# Patient Record
Sex: FEMALE | Race: WHITE | NOT HISPANIC OR LATINO | URBAN - METROPOLITAN AREA
[De-identification: names, ages, dates, MRNs, and addresses within clinical notes are randomized per-mention and may not be internally consistent; named-entity substitution may affect disease eponyms.]

---

## 2018-06-01 ENCOUNTER — INPATIENT (INPATIENT)
Facility: HOSPITAL | Age: 83
LOS: 3 days | Discharge: HOME | DRG: 61 | End: 2018-06-05
Attending: PSYCHIATRY & NEUROLOGY | Admitting: PSYCHIATRY & NEUROLOGY
Payer: MEDICARE

## 2018-06-01 VITALS
DIASTOLIC BLOOD PRESSURE: 78 MMHG | TEMPERATURE: 97 F | SYSTOLIC BLOOD PRESSURE: 160 MMHG | HEART RATE: 68 BPM | OXYGEN SATURATION: 98 % | RESPIRATION RATE: 16 BRPM

## 2018-06-01 DIAGNOSIS — I63.9 CEREBRAL INFARCTION, UNSPECIFIED: ICD-10-CM

## 2018-06-01 DIAGNOSIS — Z29.9 ENCOUNTER FOR PROPHYLACTIC MEASURES, UNSPECIFIED: ICD-10-CM

## 2018-06-01 DIAGNOSIS — S72.92XA UNSPECIFIED FRACTURE OF LEFT FEMUR, INITIAL ENCOUNTER FOR CLOSED FRACTURE: Chronic | ICD-10-CM

## 2018-06-01 LAB
ALBUMIN SERPL ELPH-MCNC: 3.9 G/DL — SIGNIFICANT CHANGE UP (ref 3.3–5)
ALP SERPL-CCNC: 77 U/L — SIGNIFICANT CHANGE UP (ref 40–120)
ALT FLD-CCNC: 11 U/L — SIGNIFICANT CHANGE UP (ref 10–45)
ANION GAP SERPL CALC-SCNC: 14 MMOL/L — SIGNIFICANT CHANGE UP (ref 5–17)
APTT BLD: 24.9 SEC — LOW (ref 27.5–37.4)
AST SERPL-CCNC: 18 U/L — SIGNIFICANT CHANGE UP (ref 10–40)
BASOPHILS NFR BLD AUTO: 0.2 % — SIGNIFICANT CHANGE UP (ref 0–2)
BILIRUB SERPL-MCNC: 0.6 MG/DL — SIGNIFICANT CHANGE UP (ref 0.2–1.2)
BUN SERPL-MCNC: 21 MG/DL — SIGNIFICANT CHANGE UP (ref 7–23)
CALCIUM SERPL-MCNC: 9 MG/DL — SIGNIFICANT CHANGE UP (ref 8.4–10.5)
CHLORIDE SERPL-SCNC: 100 MMOL/L — SIGNIFICANT CHANGE UP (ref 96–108)
CO2 SERPL-SCNC: 23 MMOL/L — SIGNIFICANT CHANGE UP (ref 22–31)
CREAT SERPL-MCNC: 0.68 MG/DL — SIGNIFICANT CHANGE UP (ref 0.5–1.3)
EOSINOPHIL NFR BLD AUTO: 1 % — SIGNIFICANT CHANGE UP (ref 0–6)
GLUCOSE SERPL-MCNC: 176 MG/DL — HIGH (ref 70–99)
HCT VFR BLD CALC: 39.1 % — SIGNIFICANT CHANGE UP (ref 34.5–45)
HGB BLD-MCNC: 12.9 G/DL — SIGNIFICANT CHANGE UP (ref 11.5–15.5)
INR BLD: 0.97 — SIGNIFICANT CHANGE UP (ref 0.88–1.16)
LYMPHOCYTES # BLD AUTO: 13.8 % — SIGNIFICANT CHANGE UP (ref 13–44)
MCHC RBC-ENTMCNC: 27.5 PG — SIGNIFICANT CHANGE UP (ref 27–34)
MCHC RBC-ENTMCNC: 33 G/DL — SIGNIFICANT CHANGE UP (ref 32–36)
MCV RBC AUTO: 83.4 FL — SIGNIFICANT CHANGE UP (ref 80–100)
MONOCYTES NFR BLD AUTO: 9.7 % — SIGNIFICANT CHANGE UP (ref 2–14)
NEUTROPHILS NFR BLD AUTO: 75.3 % — SIGNIFICANT CHANGE UP (ref 43–77)
PLATELET # BLD AUTO: 231 K/UL — SIGNIFICANT CHANGE UP (ref 150–400)
POTASSIUM SERPL-MCNC: 4.2 MMOL/L — SIGNIFICANT CHANGE UP (ref 3.5–5.3)
POTASSIUM SERPL-SCNC: 4.2 MMOL/L — SIGNIFICANT CHANGE UP (ref 3.5–5.3)
PROT SERPL-MCNC: 6.4 G/DL — SIGNIFICANT CHANGE UP (ref 6–8.3)
PROTHROM AB SERPL-ACNC: 10.8 SEC — SIGNIFICANT CHANGE UP (ref 9.8–12.7)
RBC # BLD: 4.69 M/UL — SIGNIFICANT CHANGE UP (ref 3.8–5.2)
RBC # FLD: 14.7 % — SIGNIFICANT CHANGE UP (ref 10.3–16.9)
SODIUM SERPL-SCNC: 137 MMOL/L — SIGNIFICANT CHANGE UP (ref 135–145)
TROPONIN T SERPL-MCNC: <0.01 NG/ML — SIGNIFICANT CHANGE UP (ref 0–0.01)
WBC # BLD: 6.2 K/UL — SIGNIFICANT CHANGE UP (ref 3.8–10.5)
WBC # FLD AUTO: 6.2 K/UL — SIGNIFICANT CHANGE UP (ref 3.8–10.5)

## 2018-06-01 PROCEDURE — 0042T: CPT

## 2018-06-01 PROCEDURE — 99291 CRITICAL CARE FIRST HOUR: CPT

## 2018-06-01 PROCEDURE — 70496 CT ANGIOGRAPHY HEAD: CPT | Mod: 26

## 2018-06-01 PROCEDURE — 70498 CT ANGIOGRAPHY NECK: CPT | Mod: 26

## 2018-06-01 PROCEDURE — 70450 CT HEAD/BRAIN W/O DYE: CPT | Mod: 26,59

## 2018-06-01 PROCEDURE — 99223 1ST HOSP IP/OBS HIGH 75: CPT

## 2018-06-01 RX ORDER — ALTEPLASE 100 MG
6.6 KIT INTRAVENOUS ONCE
Qty: 0 | Refills: 0 | Status: COMPLETED | OUTPATIENT
Start: 2018-06-01 | End: 2018-06-01

## 2018-06-01 RX ORDER — ALTEPLASE 100 MG
66.3 KIT INTRAVENOUS ONCE
Qty: 0 | Refills: 0 | Status: COMPLETED | OUTPATIENT
Start: 2018-06-01 | End: 2018-06-01

## 2018-06-01 RX ORDER — HALOPERIDOL DECANOATE 100 MG/ML
1 INJECTION INTRAMUSCULAR ONCE
Qty: 0 | Refills: 0 | Status: COMPLETED | OUTPATIENT
Start: 2018-06-01 | End: 2018-06-01

## 2018-06-01 RX ORDER — NICARDIPINE HYDROCHLORIDE 30 MG/1
5 CAPSULE, EXTENDED RELEASE ORAL
Qty: 40 | Refills: 0 | Status: DISCONTINUED | OUTPATIENT
Start: 2018-06-01 | End: 2018-06-01

## 2018-06-01 RX ORDER — LABETALOL HCL 100 MG
20 TABLET ORAL ONCE
Qty: 0 | Refills: 0 | Status: COMPLETED | OUTPATIENT
Start: 2018-06-01 | End: 2018-06-01

## 2018-06-01 RX ORDER — QUETIAPINE FUMARATE 200 MG/1
25 TABLET, FILM COATED ORAL ONCE
Qty: 0 | Refills: 0 | Status: COMPLETED | OUTPATIENT
Start: 2018-06-01 | End: 2018-06-01

## 2018-06-01 RX ORDER — LABETALOL HCL 100 MG
10 TABLET ORAL ONCE
Qty: 0 | Refills: 0 | Status: COMPLETED | OUTPATIENT
Start: 2018-06-01 | End: 2018-06-01

## 2018-06-01 RX ADMIN — Medication 10 MILLIGRAM(S): at 17:23

## 2018-06-01 RX ADMIN — QUETIAPINE FUMARATE 25 MILLIGRAM(S): 200 TABLET, FILM COATED ORAL at 17:58

## 2018-06-01 RX ADMIN — ALTEPLASE 66.3 MILLIGRAM(S): KIT at 16:50

## 2018-06-01 RX ADMIN — ALTEPLASE 396 MILLIGRAM(S): KIT at 16:49

## 2018-06-01 RX ADMIN — HALOPERIDOL DECANOATE 1 MILLIGRAM(S): 100 INJECTION INTRAMUSCULAR at 20:31

## 2018-06-01 RX ADMIN — Medication 0.5 MILLIGRAM(S): at 19:34

## 2018-06-01 RX ADMIN — Medication 20 MILLIGRAM(S): at 17:26

## 2018-06-01 RX ADMIN — HALOPERIDOL DECANOATE 1 MILLIGRAM(S): 100 INJECTION INTRAMUSCULAR at 19:17

## 2018-06-01 NOTE — H&P ADULT - ASSESSMENT
89 yo female with unknown past medical history who presented with aphasia, confusion, left sided droop and NIHSS of 5 - now s/p tPA at 15:28.

## 2018-06-01 NOTE — PATIENT PROFILE ADULT. - FALL HARM RISK
coagulation(Bleeding disorder R/T clinical cond/anti-coags)/s/p TPA. Agitated and confused/age(85 years old or older)/other

## 2018-06-01 NOTE — PATIENT PROFILE ADULT. - FUNCTIONAL SCREEN CURRENT LEVEL: COMMUNICATION, MLM
very confused and agitated very confused and agitated/(0) understands/communicates without difficulty

## 2018-06-01 NOTE — ED PROVIDER NOTE - CONSTITUTIONAL, MLM
normal... Well appearing, well nourished, awake, alert, oriented to person, place, and in no apparent distress.  Apparent difficulty forming sentences and following commands

## 2018-06-01 NOTE — PATIENT PROFILE ADULT. - HEALTHCARE QUESTIONS, PROFILE
(cont. 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 Atherosclerotic disease involving L carotid bifurcation w/ approximately 20-30% stenosis of L ICA origin by NASCET criteria. CTA HEAD: Branch occlusion of opercular branch of MCA. MRA Neck/ MRI brain 7/23: Acute infarct involving R anterior superior MCA territory w/o associated hemorrhage or mass effect. Suspicion of branch occlusion of opercular segment of MCA on R.CXR 7/22:(-) darryl (cont. 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 Atherosclerotic disease involving L carotid bifurcation w/ approximately 20-30% stenosis of L ICA origin by NASCET criteria. CTA HEAD: Branch occlusion of opercular branch of MCA. MRA Neck/ MRI brain 7/23: Acute infarct involving R anterior superior MCA territory w/o associated hemorrhage or mass effect. Suspicion of branch occlusion of opercular segment of MCA on R.CXR 7/22:(-)    SOCIAL HX: Pt lives with spouse in private home with 2 stairs to enter (B HR), first floor setup within. PTA, pt requiring assist with IADLs from spouse. Pt owns walker, shower chair. Pt states spouse and daughters able to provide assist upon d/c home.

## 2018-06-01 NOTE — ED ADULT NURSE NOTE - OBJECTIVE STATEMENT
Patient BIBA due to left sided facial droop and confusion, Code Stroke upgrade activated immediately on arrival and CT scan done.

## 2018-06-01 NOTE — CONSULT NOTE ADULT - SUBJECTIVE AND OBJECTIVE BOX
**STROKE CODE CONSULT NOTE**    Last known well time/Time of onset of symptoms: 06/01/19, AM     HPI: 89 F w/ unknown pmhx (on ASA), BIBEMS after doormen found her to have a facial droop. Patient unable to provide any coherent history due to confusion. Reportedly alert and oriented x3 at baseline and lives independently.  Was returning from lunch when she was noted to be confused and have left facial droop by doorman. Sent to ED, where stroke code was called. Patient found to have facial droop, left sided drift, and significant aphasia (NIH:5). Initial CTH negative, CTP without mismatch, CTA: Multifocal areas of narrowing involving P1 segment of the right   posterior cerebral artery and near origin of right superior cerebellar artery consistent with atherosclerotic changes in the posterior circulation on the right. tPA pushed at 15:28. Will admitt o MICU    PAST MEDICAL & SURGICAL HISTORY:  No pertinent past medical history  No significant past surgical history      FAMILY HISTORY:      SOCIAL HISTORY:  Smoking Cesation: Discussed    ROS:  Constitutional: No fever, weight loss or fatigue  Eyes: No eye pain, visual disturbances, or discharge  ENMT:  No difficulty hearing, tinnitus, vertigo; No sinus or throat pain  Neck: No pain or stiffness  Respiratory: No cough, wheezing, chills or hemoptysis  Cardiovascular: No chest pain, palpitations, shortness of breath, dizziness or leg swelling  Gastrointestinal: No abdominal pain. No nausea, vomiting or hematemesis; No diarrhea or constipation. Nohematochezia.  Genitourinary: No dysuria, frequency, hematuria or incontinence  Neurological: As per HPI  Skin: No itching, burning, rashes or lesions   Endocrine: No heat or cold intolerance; No hair loss  Musculoskeletal: No joint pain or swelling; No muscle, back or extremity pain  Psychiatric: No depression, anxiety, mood swings or difficulty sleeping  Heme/Lymph: No easy bruising or bleeding gums    MEDICATIONS  (STANDING):  alteplase    Bolus 6.6 milliGRAM(s) IV Bolus once  alteplase    IVPB 66.3 milliGRAM(s) IV Intermittent once    MEDICATIONS  (PRN):      Allergies    No Known Allergies    Intolerances        Vital Signs Last 24 Hrs  T(C): 36.2 (01 Jun 2018 16:00), Max: 36.2 (01 Jun 2018 16:00)  T(F): 97.1 (01 Jun 2018 16:00), Max: 97.1 (01 Jun 2018 16:00)  HR: 67 (01 Jun 2018 16:30) (67 - 84)  BP: 185/82 (01 Jun 2018 16:30) (153/73 - 192/91)  BP(mean): --  RR: 18 (01 Jun 2018 16:30) (16 - 18)  SpO2: 100% (01 Jun 2018 16:30) (98% - 100%)    PHYSICAL EXAM:  Constitutional: NAD, confused  Cardiovascular: RRR, no appreciable murmurs; no carotid bruits  Neurologic:  Mental status: Awake, alert and oriented x1.  Recent and remote memory impaired.  Naming, repetition and comprehension impaired  Attention/concentration intact. +APHASIA,  No dysarthria.   Cranial nerves: + LEFT FACIAL DROOP. Fundoscopic exam demonstrated no abnormalities, pupils equally round and reactive to light, visual fields full, no nystagmus, extraocular muscles intact, hearing intact to finger rub, palate elevation symmetric, tongue was midline, sternocleidomastoid/shoulder shrug strength bilaterally 5/5.    Motor:  LUE AND LLE DRIFT. Right sided motor intact 5/5.  Sensation: Intact to light touch, proprioception, vibration, temperature, pinprick.  No neglect.   Coordination: No dysmetria on finger-to-nose and heel-to-shin.  No clumsiness.  Reflexes: 2+ in upper and lower extremities, absent Babinski bilaterally  Gait: Narrow and steady. No ataxia.  Romberg negative    NIHSS: 5    Fingerstick Blood Glucose: CAPILLARY BLOOD GLUCOSE  152 (01 Jun 2018 16:37)      POCT Blood Glucose.: 152 mg/dL (01 Jun 2018 15:03)       LABS:                        12.9   6.2   )-----------( 231      ( 01 Jun 2018 15:19 )             39.1     06-01    137  |  100  |  21  ----------------------------<  176<H>  4.2   |  23  |  0.68    Ca    9.0      01 Jun 2018 15:19    TPro  6.4  /  Alb  3.9  /  TBili  0.6  /  DBili  x   /  AST  18  /  ALT  11  /  AlkPhos  77  06-01    PT/INR - ( 01 Jun 2018 15:19 )   PT: 10.8 sec;   INR: 0.97          PTT - ( 01 Jun 2018 15:19 )  PTT:24.9 sec  CARDIAC MARKERS ( 01 Jun 2018 15:19 )  x     / <0.01 ng/mL / x     / x     / x              RADIOLOGY & ADDITIONAL STUDIES:  < from: CT Brain Stroke Protocol (06.01.18 @ 16:00) >  EXAM:  CT BRAIN STROKE PROTOCOL                          PROCEDURE DATE:  06/01/2018          INTERPRETATION:  PROCEDURE: CT head without intravenous contrast.    INDICATION: Expressive aphasia, facial droop    TECHNIQUE: Multiple axial sections were obtained at 5 mm intervals. The   images were reviewed in brain and bone windows. Imaging is performed   using helical low-dose technique, and sagittal and coronal reformations   are provided.    COMPARISON: 09/12/2009    FINDINGS:    There is diffuse cortical volume loss which has progressed compared to   the 2009 study. No evidence of hydrocephalus. There is also progression   and much more evident small vessel ischemic change with confluent and   patchy periventricular white matter lucency. There are new a chronic   appearing infarctions in the left caudate nucleus and putamen and in the   right occipital lobe where there is focal encephalomalacia and gliosis.    There is no acute intracranial hemorrhage or CT evidence of recent   transcortical infarction. No mass effect or midline shift or extra-axial   collection.    The bone window images show no calvarial fracture. The included paranasal   sinuses and mastoid air cells are predominantly well ventilated. The   native ocular lensesare absent, new from 2009.    IMPRESSION:     No acute intracranial abnormality. Specifically, no acute intracranial   hemorrhage, mass effect or recent transcortical or territorial infarction.    Parenchymal volume loss and small vessel ischemic changes have progressed   compared to 2009. Left basal ganglia and right occipital prior   infarction, though new from 2009.      < end of copied text >  CTP: no mismatch     < from: CT Angio Neck w/ IV Cont (06.01.18 @ 15:39) >    EXAM:  CT ANGIO BRAIN (W)AW IC                          EXAM:  CT ANGIO NECK (W)AW IC                          PROCEDURE DATE:  06/01/2018          INTERPRETATION:  CT ANGIOGRAM OF THE CERVICAL ARTERIES   CTA OF THE [place] OF [place]:    INDICATION:Expressive aphasia, facial droop.    TECHNIQUE:     CTA [place] OF [place]:     After the intravenous power injection of non-ionic contrast material,   serial thin sections were obtained through the intracranial circulation   on a multislice CT scannerfollowed by multiplanar 3D reformations, and   comparison is made to the prior CT head noncontrast from 6/1/2018.     CTA NECK:    After the intravenous power injection of non-ionic contrast material,   serial thin sections were obtained through the cervical circulation on a   multislice CT scanner, and comparison is made to the prior CT head   noncontrast from 6/1/2018.     FINDINGS:    CTA [place]:    There is no stenosis seen in the distal vertebral arteries. There are   atherosclerotic calcifications noted along the distal vertebral arteries,   left more than right. Basilar artery demonstrates no stenosis. There is   no stenosis in the left superior cerebellar artery and left posterior   cerebral artery. There is a mild degree ofnarrowing at the origin of   right superior cerebellar artery. There are 2 focal areas of diminished   flow-related enhancement consistent with stenoses involving P1 segment of   right PCA likely related to atherosclerotic disease. No stenosis is seen   in the MICHELLE bilaterally. There is no stenosis identified in the MCA   bilaterally. There is no significant stenosis seen in the cavernous or   supraclinoid ICA. There are atherosclerotic calcifications noted at the   level of the cavernous segment of the ICA bilaterally.     CTA NECK:    Origin of the left subclavian artery, left common carotid artery, right   innominate artery, right subclavian artery, and right common carotid   artery are all patent. Both vertebral artery origins are patent.    The cervical course of the vertebral arteries demonstrates no stenosis.   There is no left carotid bifurcation stenosis identified. There are   atherosclerotic calcifications along the origin of right ICA but there is   no significant stenosis.    There is multilevel degenerative osteoarthritis in which findings loss of   disc space height and endplate sclerosis particularly from C4-C5 through   to the C6-C7 levels. There is multilevel degenerative osteoarthritis.   Findings include marginal endplate osteophytes and uncovertebral   spurring, and there are anterolistheses of C2 on C3 and C3 on C4.     IMPRESSION:    CTA COW:    1. No high-grade stenosis of the anterior circulation.  2. Multifocal areas of narrowing involving P1 segment of the right   posterior cerebral artery and near origin of right superior cerebellar   artery consistent with atherosclerotic changes in the posterior   circulation on the right.    CTA NECK:    1. No vertebral artery stenosis.   2. No carotid bifurcationstenosis.  3. 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          < end of copied text >    IV-tPA (Y/N):                                   Bolus time:  Reason IV-tPA not given:    ASSESSMENT/PLAN:  89 year old female w/ unknown pmhx, on ASA, presents with aphasia with left sided droop and LUE, LLE drift (NIH 5). CT imaging without acute stroke, however with atherosclerotic changes involving posterior segments. tPA administered at 15:28.  - Admit to MICU for neurological monitoring  - tPA protocol     Discussed w/ stroke attending

## 2018-06-01 NOTE — ED PROVIDER NOTE - NEUROLOGICAL, MLM
Alert and oriented to self, no facial asymmetry, expressive and receptive difficulty with words and following commands

## 2018-06-01 NOTE — PATIENT PROFILE ADULT. - ABILITY TO HEAR (WITH HEARING AID OR HEARING APPLIANCE IF NORMALLY USED):
hearing aide is at home/Mildly to Moderately Impaired: difficulty hearing in some environments or speaker may need to increase volume or speak distinctly

## 2018-06-01 NOTE — H&P ADULT - NSHPLABSRESULTS_GEN_ALL_CORE
LABS:                        12.9   6.2   )-----------( 231      ( 01 Jun 2018 15:19 )             39.1     06-01    137  |  100  |  21  ----------------------------<  176<H>  4.2   |  23  |  0.68    Ca    9.0      01 Jun 2018 15:19    TPro  6.4  /  Alb  3.9  /  TBili  0.6  /  DBili  x   /  AST  18  /  ALT  11  /  AlkPhos  77  06-01    PT/INR - ( 01 Jun 2018 15:19 )   PT: 10.8 sec;   INR: 0.97          PTT - ( 01 Jun 2018 15:19 )  PTT:24.9 sec    CT Brain Stroke Protocol (06.01.18 @ 16:00)     IMPRESSION:     No acute intracranial abnormality. Specifically, no acute intracranial   hemorrhage, mass effect or recent transcortical or territorial infarction.    Parenchymal volume loss and small vessel ischemic changes have progressed   compared to 2009. Left basal ganglia and right occipital prior   infarction, though new from 2009.    IMPRESSION:    CTA COW:    1. No high-grade stenosis of the anterior circulation.  2. Multifocal areas of narrowing involving P1 segment of the right   posterior cerebral artery and near origin of right superior cerebellar   artery consistent with atherosclerotic changes in the posterior   circulation on the right.    CTA NECK:    1. No vertebral artery stenosis.   2. No carotid bifurcationstenosis.  3. 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

## 2018-06-01 NOTE — H&P ADULT - NSHPPHYSICALEXAM_GEN_ALL_CORE
ICU Vital Signs Last 24 Hrs  T(C): 36.2 (01 Jun 2018 18:30), Max: 36.2 (01 Jun 2018 16:00)  T(F): 97.2 (01 Jun 2018 18:30), Max: 97.2 (01 Jun 2018 17:00)  HR: 98 (01 Jun 2018 18:30) (67 - 98)  BP: 148/77 (01 Jun 2018 18:30) (148/77 - 192/91)  BP(mean): --  ABP: --  ABP(mean): --  RR: 16 (01 Jun 2018 18:30) (16 - 18)  SpO2: 98% (01 Jun 2018 18:30) (98% - 100%)    PHYSICAL EXAM:    Constitutional: confused, intermittently answering questions appropriately  HEENT: PERRLA, EOMI, Normal Hearing, DMM  Neck: No LAD, No JVD  Back: Normal spine flexure, No CVA tenderness  Respiratory: CTAB  Cardiovascular: S1 and S2, tachycardic regular, no M/G/R  Gastrointestinal: BS+, soft, NT/ND  Extremities: No peripheral edema  Vascular: 2+ peripheral pulses  Neurological: A/O x 2. CN 2-12 intact. No appreciable facial droop on exam. Patient's speech intelligible, understands and repeats. Motor strength 4/5 b/l ue proximal and distal. 5/5 finger strength b/l. 5/5 lower extremity proximal/distal b/l. Sensation grossly intact throughout, no neglect noted. Downgoing toes b/l. Reflexes 2+ throughout. Cerebellar testing intact - finger to nose + heel to shin. Gait deferred.   Skin: seborrheic keratosis lower extremities

## 2018-06-01 NOTE — H&P ADULT - PROBLEM SELECTOR PLAN 1
- patient presenting with signs and symptoms consistent with ischemic stroke. NIHSS of 5 on admission. Now s/p tPA at 15:28. CT head w/o hemorrhage.   - CTA head and neck with evidence of atherosclerosis and previous strokes, no opacities consistent with thrombus  - transfer to MICU for tPA protocol  - goal BP <180/105 mmHg; nicardipine gtt to target goal   - sedation as needed with haldol/ativan given patient's confusion and attempts to leave bed  - PT eval after 24 hours  - bedside dysphagia  - repeat head ct tomorrow afternoon  - monitor on telemetry for arrythmia  - further work up following tPA protocol.

## 2018-06-01 NOTE — H&P ADULT - NSHPSOCIALHISTORY_GEN_ALL_CORE
Patient retired, lives at home alone. Has home health aide three days per week. Uses a walker at home. Denies alcohol, tobacco, drug use.

## 2018-06-01 NOTE — ED PROVIDER NOTE - MEDICAL DECISION MAKING DETAILS
acute cva with expressive aphasia and difficulty following commands.  stroke code called on arrival.  initial ct neg for acute changes.  stroke attending Dr. Luna at bedside.  decision made to give TPA>  ct perfusion and cta performed.  tpa given.  admit to ICU

## 2018-06-01 NOTE — H&P ADULT - HISTORY OF PRESENT ILLNESS
89 yo female with unknown past medical history who presents from home with facial droop, aphasia after she was found be her doorman who called the ambulance. Per the son patient is semi independent at baseline - she lives alone but has an aide who visits her 3 times per week. She ambulates typically with a walker but has had multiple falls in the past resulting in hip fracture and knee fracture. Son is unaware of her past medical history but states that she follows with a PMD - home meds found by son include celecoxib and ambien 10 mg. Old prescriptions found at home included gabapentin, oxycodone. Patient reports taking her medications but is unclear about which specifically. On arrival to the ED stroke code was called and patient was found to have NIHSS of 5 for facial droop, left sided drift and significant aphasia.  Head CT showed no acute bleed so tPA was administered at 15:28. Patient admitted to MICU per tPA protocol.     In the ED patient received seroquel 25 mg for agitation and labetalol for elevated BP.

## 2018-06-01 NOTE — ED PROVIDER NOTE - OBJECTIVE STATEMENT
brought in by EMS with confusion and reported facial droop.  Patient unable to provide any coherent history due to confusion.  Reportedly alert and oriented x3 at baseline and lives independently.  Was returning from lunch when she was noted to be confused and have left facial droop by doorman.  EMS arrived and noted resolution of facial droop but persistence of confusion.

## 2018-06-01 NOTE — ED PEDIATRIC NURSE REASSESSMENT NOTE - NS ED NURSE REASSESS COMMENT FT2
In to round on patient. Patient attempting to climb out of bed. Alert and oriented to self only. Per bedside RN Bola patient was cooperative during administration of TPA but now is acutely more restless. Patient BEAL x 4, kicking, slapping staff members and attempting to climb out of bed. Unable to obtain VS due to patients combativeness. Reinforced safety and plan of care. Attempts to deescalate patient unsuccessful due to agitation and confusion. ICU resident and MD Healy contacted to reevaluate patient in setting of post TPA confusion and agitation. Patient pending bed transfer. ICU at bedside evaluating patient.

## 2018-06-01 NOTE — ED ADULT NURSE REASSESSMENT NOTE - NS ED NURSE REASSESS COMMENT FT1
Patient on Stroke Code activation protocol, BIBA due to confusion and left sided facial droop.  Patient arrives disoriented to place, w/ expressive and verbal aphasia, left sided facial droop, left upper and lower extremity drift, no chest pain or SOB, c/o of weakness.  NIH scale score 5.  CT scan head and CT head angio completed.  Left and right AC PIV #18 in place, all labs sent.  Neuro at bedside Dr. Larose, decision to administered TPA.  TPA bolus 6.63mg IVP administered by Dr. Larose @ 1528 Hr.  followed by TPA IV drip started at 1532H.  Neuro assessments, cardiac and BP monitoring ongoing every 15 min.  NIH scale score improved. Dysphagia screening completed and passed.   Labetatalol 10mg IVP followed by labetalol 20mg IVP 1 hour after administered as ordered per protocol to maintain SBP < 180mmHg w/ good effects.  Patient had episode of agitation, trying to get out of bed and leave, Dr. Larose at bedside and evaluated patient.  Seroquel 25mg PO adminsitered as ordered.  1:1 bedside EDT ongoing for patient safety and agitation.  Patient for admit;  room assignment pending.  Cardiac , BP and Neuro assessments ongoing every 15min post TPA as ordered.  Patient stable and comfortable.
ADmitting ICU MDs Dr. Castro and Dr. Amezquita at bedside re-evaluating patient, states patient NOT having a stroke at this time, patient agitated but no new neuro deficits, NO need for new CT scan as per MDs and may be safely transported to [facility] at this time in stable condition.  NSR on cardiac monitor, vital signs stable, NIH scale score 0.
Dr. Amezquita admitting MD ICU called to re-evaluate patient prior to transport to [facility] after endorsement and report received by [facility] EVELYN Black .  Reported to Dr. Amezquita patient more agitated, sometimes combative, wants to leave,crying and yelling.  ED Educator EVELYN Hearn  and EDTs at bedside for patient safety.  Dr. Amezquita stated will come down to Ed to evaluate patient for possible repeat CT scan.
Patient more agitated at this time, insisting on leaving, sometimes screaming and yelling, sometimes crying,  1:1 in place for patient safety and patient constantly reasuured.  7 Sharad RN Sofia received endorsement report and care at this time.  ICU MD also called at this time due to patient increased restlessness and agitation for further bedside evaluation.

## 2018-06-02 DIAGNOSIS — G92 TOXIC ENCEPHALOPATHY: ICD-10-CM

## 2018-06-02 DIAGNOSIS — N30.00 ACUTE CYSTITIS WITHOUT HEMATURIA: ICD-10-CM

## 2018-06-02 DIAGNOSIS — R33.9 RETENTION OF URINE, UNSPECIFIED: ICD-10-CM

## 2018-06-02 LAB
ANION GAP SERPL CALC-SCNC: 13 MMOL/L — SIGNIFICANT CHANGE UP (ref 5–17)
APPEARANCE UR: CLEAR — SIGNIFICANT CHANGE UP
APTT BLD: 25.2 SEC — LOW (ref 27.5–37.4)
BILIRUB UR-MCNC: NEGATIVE — SIGNIFICANT CHANGE UP
BUN SERPL-MCNC: 13 MG/DL — SIGNIFICANT CHANGE UP (ref 7–23)
CALCIUM SERPL-MCNC: 8.8 MG/DL — SIGNIFICANT CHANGE UP (ref 8.4–10.5)
CHLORIDE SERPL-SCNC: 97 MMOL/L — SIGNIFICANT CHANGE UP (ref 96–108)
CO2 SERPL-SCNC: 25 MMOL/L — SIGNIFICANT CHANGE UP (ref 22–31)
COLOR SPEC: YELLOW — SIGNIFICANT CHANGE UP
CREAT SERPL-MCNC: 0.54 MG/DL — SIGNIFICANT CHANGE UP (ref 0.5–1.3)
DIFF PNL FLD: ABNORMAL
GLUCOSE SERPL-MCNC: 135 MG/DL — HIGH (ref 70–99)
GLUCOSE UR QL: NEGATIVE — SIGNIFICANT CHANGE UP
HCT VFR BLD CALC: 37.7 % — SIGNIFICANT CHANGE UP (ref 34.5–45)
HGB BLD-MCNC: 12.4 G/DL — SIGNIFICANT CHANGE UP (ref 11.5–15.5)
INR BLD: 1.03 — SIGNIFICANT CHANGE UP (ref 0.88–1.16)
KETONES UR-MCNC: NEGATIVE — SIGNIFICANT CHANGE UP
LEUKOCYTE ESTERASE UR-ACNC: ABNORMAL
MAGNESIUM SERPL-MCNC: 2 MG/DL — SIGNIFICANT CHANGE UP (ref 1.6–2.6)
MCHC RBC-ENTMCNC: 27.4 PG — SIGNIFICANT CHANGE UP (ref 27–34)
MCHC RBC-ENTMCNC: 32.9 G/DL — SIGNIFICANT CHANGE UP (ref 32–36)
MCV RBC AUTO: 83.2 FL — SIGNIFICANT CHANGE UP (ref 80–100)
NITRITE UR-MCNC: POSITIVE
PH UR: 6.5 — SIGNIFICANT CHANGE UP (ref 5–8)
PLATELET # BLD AUTO: 201 K/UL — SIGNIFICANT CHANGE UP (ref 150–400)
POTASSIUM SERPL-MCNC: 3.8 MMOL/L — SIGNIFICANT CHANGE UP (ref 3.5–5.3)
POTASSIUM SERPL-SCNC: 3.8 MMOL/L — SIGNIFICANT CHANGE UP (ref 3.5–5.3)
PROT UR-MCNC: NEGATIVE MG/DL — SIGNIFICANT CHANGE UP
PROTHROM AB SERPL-ACNC: 11.4 SEC — SIGNIFICANT CHANGE UP (ref 9.8–12.7)
RBC # BLD: 4.53 M/UL — SIGNIFICANT CHANGE UP (ref 3.8–5.2)
RBC # FLD: 14.8 % — SIGNIFICANT CHANGE UP (ref 10.3–16.9)
SODIUM SERPL-SCNC: 135 MMOL/L — SIGNIFICANT CHANGE UP (ref 135–145)
SP GR SPEC: 1.01 — SIGNIFICANT CHANGE UP (ref 1–1.03)
UROBILINOGEN FLD QL: 0.2 E.U./DL — SIGNIFICANT CHANGE UP
WBC # BLD: 5.5 K/UL — SIGNIFICANT CHANGE UP (ref 3.8–10.5)
WBC # FLD AUTO: 5.5 K/UL — SIGNIFICANT CHANGE UP (ref 3.8–10.5)

## 2018-06-02 PROCEDURE — 70450 CT HEAD/BRAIN W/O DYE: CPT | Mod: 26

## 2018-06-02 PROCEDURE — 99233 SBSQ HOSP IP/OBS HIGH 50: CPT

## 2018-06-02 PROCEDURE — 93306 TTE W/DOPPLER COMPLETE: CPT | Mod: 26

## 2018-06-02 RX ORDER — HEPARIN SODIUM 5000 [USP'U]/ML
5000 INJECTION INTRAVENOUS; SUBCUTANEOUS EVERY 8 HOURS
Qty: 0 | Refills: 0 | Status: DISCONTINUED | OUTPATIENT
Start: 2018-06-02 | End: 2018-06-05

## 2018-06-02 RX ORDER — CEFTRIAXONE 500 MG/1
1 INJECTION, POWDER, FOR SOLUTION INTRAMUSCULAR; INTRAVENOUS EVERY 24 HOURS
Qty: 0 | Refills: 0 | Status: DISCONTINUED | OUTPATIENT
Start: 2018-06-02 | End: 2018-06-04

## 2018-06-02 RX ORDER — ATORVASTATIN CALCIUM 80 MG/1
40 TABLET, FILM COATED ORAL AT BEDTIME
Qty: 0 | Refills: 0 | Status: DISCONTINUED | OUTPATIENT
Start: 2018-06-02 | End: 2018-06-05

## 2018-06-02 RX ADMIN — HEPARIN SODIUM 5000 UNIT(S): 5000 INJECTION INTRAVENOUS; SUBCUTANEOUS at 21:28

## 2018-06-02 RX ADMIN — CEFTRIAXONE 100 GRAM(S): 500 INJECTION, POWDER, FOR SOLUTION INTRAMUSCULAR; INTRAVENOUS at 02:46

## 2018-06-02 RX ADMIN — ATORVASTATIN CALCIUM 40 MILLIGRAM(S): 80 TABLET, FILM COATED ORAL at 21:28

## 2018-06-02 NOTE — PROVIDER CONTACT NOTE (OTHER) - SITUATION
Upon reassessment patient noted with lower abdominal distention. Patient restless stating that she needs to use the bathroom. Unable to void in bed pain when toileting.

## 2018-06-02 NOTE — PROGRESS NOTE ADULT - PROBLEM SELECTOR PLAN 2
F: no IVF  E: replete prn  N: pending dysphagia screen, dash tlc  full code  dispo: MICU -UA mildly positive, no suprapubic tenderness  -started ceftriaxone 1g qd (6/2 - present)

## 2018-06-02 NOTE — PROGRESS NOTE ADULT - PROBLEM SELECTOR PLAN 4
F: no IVF  E: replete prn  N: pending dysphagia screen, dash tlc  full code  dispo: MICU -resolved  -likely 2/2 UTI

## 2018-06-02 NOTE — PROGRESS NOTE ADULT - PROBLEM SELECTOR PLAN 3
-resolved  -likely 2/2 UTI -acute onset  -etiology unknown  -garcía placed on 6/2/18  -will have TOV

## 2018-06-02 NOTE — PROVIDER CONTACT NOTE (OTHER) - ACTION/TREATMENT ORDERED:
Straight catheter completed as per MD order. Please review EMR for details. Patient verbalized relief. Will continue to monitor.

## 2018-06-02 NOTE — PROGRESS NOTE ADULT - PROBLEM SELECTOR PLAN 1
- patient presenting with signs and symptoms consistent with ischemic stroke. NIHSS of 5 on admission. Now s/p tPA at 15:28. CT head w/o hemorrhage.   - CTA head and neck with evidence of atherosclerosis and previous strokes, no opacities consistent with thrombus  - transfer to MICU for tPA protocol  - goal BP <180/105 mmHg; nicardipine gtt to target goal   - sedation as needed with haldol/ativan given patient's confusion and attempts to leave bed  - PT eval after 24 hours  - bedside dysphagia  - repeat head ct tomorrow afternoon  - monitor on telemetry for arrythmia  - further work up following tPA protocol. - patient presenting with signs and symptoms consistent with ischemic stroke. NIHSS of 5 on admission. Now s/p tPA at 15:28. CT head w/o hemorrhage.   - CTA head and neck with evidence of atherosclerosis and previous strokes, no opacities consistent with thrombus  - transfer to MICU for tPA protocol  - goal BP <180/105 mmHg;   - sedation as needed with haldol/ativan given patient's confusion and attempts to leave bed  - PT eval after 24 hours  - passed bedside dysphagia  - repeat head ct tomorrow afternoon  - monitor on telemetry for arrythmia  - further work up following tPA protocol. - patient presenting with signs and symptoms consistent with ischemic stroke. NIHSS of 5 on admission. Now s/p tPA at 15:28. CT head w/o hemorrhage.   - CTA head and neck with evidence of atherosclerosis and previous strokes, no opacities consistent with thrombus  - transfer to MICU for tPA protocol  - goal BP <180/105 mmHg;   - sedation as needed with haldol/ativan given patient's confusion and attempts to leave bed  - PT eval after 24 hours  - passed bedside dysphagia  - repeat head ct on 6/2/18 stable  - monitor on telemetry for arrythmia  - started lipitor 80, ASA 81 and plavix 75  - CARLA ordered - patient presenting with signs and symptoms consistent with ischemic stroke. NIHSS of 5 on admission. Now s/p tPA at 15:28. CT head w/o hemorrhage.   - CTA head and neck with evidence of atherosclerosis and previous strokes, no opacities consistent with thrombus  - transfer to MICU for tPA protocol  - goal BP <180/105 mmHg;   - sedation as needed with haldol/ativan given patient's confusion and attempts to leave bed  - PT eval after 24 hours  - passed bedside dysphagia  - repeat head ct on 6/2/18 stable  - monitor on telemetry for arrythmia  - started lipitor 80   - consider starting ASA 81 and plavix 75  - CARLA ordered

## 2018-06-02 NOTE — PROGRESS NOTE ADULT - SUBJECTIVE AND OBJECTIVE BOX
Hospital course:  89 yo female with unknown past medical history who presents from home with facial droop, aphasia after she was found be her doorman who called the ambulance. On arrival to the ED stroke code was called and patient was found to have NIHSS of 5 for facial droop, left sided drift and significant aphasia.  Head CT showed no acute bleed. CTA showed narrowing involving P1 segment of the right posterior cerebral artery and near origin of right superior cerebellar artery consistent with atherosclerotic changes in the posterior circulation on the right.  TPA was administered at 15:28 on 18. Patient admitted to MICU per tPA protocol. Overnight, patient was found to have urinary retention of 1L urine, UA remarkable for nitrite/leuk esterase, ceftriaxone was started. At present, her presenting neurological deficits has resolved. Repeat head CT on 18 is stable. Patient is stepped down to 7L for further stroke work-up.    INTERVAL HPI/OVERNIGHT EVENTS: Neuro deficits resolved. Retained about 1L urine, UA remarkable for leuk esterase/nitrite, started ceftriaxone.     SUBJECTIVE: Patient seen and examined at bedside. AAOx3. Good insight. No fever, chills, n/v or diarrhea.    OBJECTIVE:    VITAL SIGNS:  ICU Vital Signs Last 24 Hrs  T(C): 36.9 (2018 14:00), Max: 36.9 (2018 14:00)  T(F): 98.4 (2018 14:00), Max: 98.4 (2018 14:00)  HR: 70 (2018 11:00) (66 - 98)  BP: 145/57 (2018 11:00) (100/41 - 185/82)  BP(mean): 91 (2018 11:00) (62 - 117)  ABP: --  ABP(mean): --  RR: 11 (2018 11:00) (10 - 40)  SpO2: 95% (2018 11:00) (92% - 100%)         @ 07:01  -   @ 07:00  --------------------------------------------------------  IN: 50 mL / OUT: 1100 mL / NET: -1050 mL     @ 07:01  -   @ 15:41  --------------------------------------------------------  IN: 0 mL / OUT: 655 mL / NET: -655 mL      CAPILLARY BLOOD GLUCOSE  152 (2018 16:37)      POCT Blood Glucose.: 126 mg/dL (2018 06:36)      PHYSICAL EXAM:    Constitutional: confused, intermittently answering questions appropriately  HEENT: PERRLA, EOMI, Normal Hearing, DMM  Neck: No LAD, No JVD  Back: Normal spine flexure, No CVA tenderness  Respiratory: CTAB  Cardiovascular: S1 and S2, tachycardic regular, no M/G/R  Gastrointestinal: BS+, soft, NT/ND  Extremities: No peripheral edema  Vascular: 2+ peripheral pulses    Neurological:   AAO x 3. CN 2-12 intact. No appreciable facial droop on exam. Patient's speech intelligible, understands and repeats.  Motor strength 4+/5 b/l ue proximal and distal. 4+/5 finger strength b/l. 4+/5 lower extremity proximal/distal b/l.   Sensation grossly intact throughout, no neglect noted. Downgoing toes b/l. Reflexes 2+ throughout.   Cerebellar testing intact - finger to nose + heel to shin. Gait deferred.   Skin: seborrheic keratosis lower extremities    MEDICATIONS:  MEDICATIONS  (STANDING):  cefTRIAXone   IVPB 1 Gram(s) IV Intermittent every 24 hours    MEDICATIONS  (PRN):      ALLERGIES:  Allergies    No Known Allergies    Intolerances        LABS:                        12.4   5.5   )-----------( 201      ( 2018 06:51 )             37.7     06-    135  |  97  |  13  ----------------------------<  135<H>  3.8   |  25  |  0.54    Ca    8.8      2018 06:51  Mg     2.0     06-    TPro  6.4  /  Alb  3.9  /  TBili  0.6  /  DBili  x   /  AST  18  /  ALT  11  /  AlkPhos  77  06-    PT/INR - ( 2018 06:51 )   PT: 11.4 sec;   INR: 1.03          PTT - ( 2018 06:51 )  PTT:25.2 sec  Urinalysis Basic - ( 2018 01:00 )    Color: Yellow / Appearance: Clear / S.010 / pH: x  Gluc: x / Ketone: NEGATIVE  / Bili: Negative / Urobili: 0.2 E.U./dL   Blood: x / Protein: NEGATIVE mg/dL / Nitrite: POSITIVE   Leuk Esterase: Small / RBC: < 5 /HPF / WBC > 10 /HPF   Sq Epi: x / Non Sq Epi: 0-5 /HPF / Bacteria: Many /HPF        RADIOLOGY & ADDITIONAL TESTS: Reviewed.      CT Brain Stroke Protocol (18 @ 16:00)  IMPRESSION:   No acute intracranial abnormality. Specifically, no acute intracranial   hemorrhage, mass effect or recent transcortical or territorial infarction.  Parenchymal volume loss and small vessel ischemic changes have progressed   compared to 2009. Left basal ganglia and right occipital prior   infarction, though new from 2009.        CT Perfusion w/ Maps w/ IV Cont (18 @ 15:40) >  FINDINGS: The color maps demonstrates focal area of decreased CBV and CBF   within the paramedian right occipital lobe without Tmax elevation which   corresponds to old infarct on the noncontrast CT. There is no calculated   RAPID CBF volume < 30% deficit or elevated Tmax volume >6 seconds. There   is no mismatch deficit.  IMPRESSION: No reduced CBF or mismatch.        CT Angio Neck w/ IV Cont (18 @ 15:39)   IMPRESSION:  CTA COW:  1. No high-grade stenosis of the anterior circulation.  2. Multifocal areas of narrowing involving P1 segment of the right   posterior cerebral artery and near origin of right superior cerebellar   artery consistent with atherosclerotic changes in the posterior   circulation on the right.  CTA NECK:  1. No vertebral artery stenosis.   2. No carotid bifurcationstenosis.  3. 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 Hospital course:  89 yo female with unknown past medical history who presents from home with facial droop, aphasia after she was found be her doorman who called the ambulance. On arrival to the ED stroke code was called and patient was found to have NIHSS of 5 for facial droop, left sided drift and significant aphasia.  Head CT showed no acute bleed. CTA showed narrowing involving P1 segment of the right posterior cerebral artery and near origin of right superior cerebellar artery consistent with atherosclerotic changes in the posterior circulation on the right.  TPA was administered at 15:28 on 18. Patient admitted to MICU per tPA protocol. Overnight, patient was found to have urinary retention of 1L urine, UA remarkable for nitrite/leuk esterase, ceftriaxone was started. At present, her presenting neurological deficits has resolved. Repeat head CT on 18 is stable. Echo with bubble significant for LVH with EF 60%. Patient is stepped down to 7L for further stroke work-up.    INTERVAL HPI/OVERNIGHT EVENTS: Neuro deficits resolved. Retained about 1L urine, UA remarkable for leuk esterase/nitrite, started ceftriaxone.     SUBJECTIVE: Patient seen and examined at bedside. AAOx3. Good insight. No fever, chills, n/v or diarrhea.    OBJECTIVE:    VITAL SIGNS:  ICU Vital Signs Last 24 Hrs  T(C): 36.9 (2018 14:00), Max: 36.9 (2018 14:00)  T(F): 98.4 (2018 14:00), Max: 98.4 (2018 14:00)  HR: 70 (2018 11:00) (66 - 98)  BP: 145/57 (2018 11:00) (100/41 - 185/82)  BP(mean): 91 (2018 11:00) (62 - 117)  ABP: --  ABP(mean): --  RR: 11 (2018 11:00) (10 - 40)  SpO2: 95% (2018 11:00) (92% - 100%)         @ 07:01  -  - @ 07:00  --------------------------------------------------------  IN: 50 mL / OUT: 1100 mL / NET: -1050 mL     @ 07:01  -   @ 15:41  --------------------------------------------------------  IN: 0 mL / OUT: 655 mL / NET: -655 mL      CAPILLARY BLOOD GLUCOSE  152 (2018 16:37)      POCT Blood Glucose.: 126 mg/dL (2018 06:36)      PHYSICAL EXAM:    Constitutional: confused, intermittently answering questions appropriately  HEENT: PERRLA, EOMI, Normal Hearing, DMM  Neck: No LAD, No JVD  Back: Normal spine flexure, No CVA tenderness  Respiratory: CTAB  Cardiovascular: S1 and S2, tachycardic regular, no M/G/R  Gastrointestinal: BS+, soft, NT/ND  Extremities: No peripheral edema  Vascular: 2+ peripheral pulses    Neurological:   AAO x 3. CN 2-12 intact. No appreciable facial droop on exam. Patient's speech intelligible, understands and repeats.  Motor strength 4+/5 b/l ue proximal and distal. 4+/5 finger strength b/l. 4+/5 lower extremity proximal/distal b/l.   Sensation grossly intact throughout, no neglect noted. Downgoing toes b/l. Reflexes 2+ throughout.   Cerebellar testing intact - finger to nose + heel to shin. Gait deferred.   Skin: seborrheic keratosis lower extremities    MEDICATIONS:  MEDICATIONS  (STANDING):  cefTRIAXone   IVPB 1 Gram(s) IV Intermittent every 24 hours    MEDICATIONS  (PRN):      ALLERGIES:  Allergies    No Known Allergies    Intolerances        LABS:                        12.4   5.5   )-----------( 201      ( 2018 06:51 )             37.7     06-    135  |  97  |  13  ----------------------------<  135<H>  3.8   |  25  |  0.54    Ca    8.8      2018 06:51  Mg     2.0     06-02    TPro  6.4  /  Alb  3.9  /  TBili  0.6  /  DBili  x   /  AST  18  /  ALT  11  /  AlkPhos  77  06-01    PT/INR - ( 2018 06:51 )   PT: 11.4 sec;   INR: 1.03          PTT - ( 2018 06:51 )  PTT:25.2 sec  Urinalysis Basic - ( 2018 01:00 )    Color: Yellow / Appearance: Clear / S.010 / pH: x  Gluc: x / Ketone: NEGATIVE  / Bili: Negative / Urobili: 0.2 E.U./dL   Blood: x / Protein: NEGATIVE mg/dL / Nitrite: POSITIVE   Leuk Esterase: Small / RBC: < 5 /HPF / WBC > 10 /HPF   Sq Epi: x / Non Sq Epi: 0-5 /HPF / Bacteria: Many /HPF        RADIOLOGY & ADDITIONAL TESTS: Reviewed.      CT Brain Stroke Protocol (18 @ 16:00)  IMPRESSION:   No acute intracranial abnormality. Specifically, no acute intracranial   hemorrhage, mass effect or recent transcortical or territorial infarction.  Parenchymal volume loss and small vessel ischemic changes have progressed   compared to 2009. Left basal ganglia and right occipital prior   infarction, though new from 2009.        CT Perfusion w/ Maps w/ IV Cont (18 @ 15:40) >  FINDINGS: The color maps demonstrates focal area of decreased CBV and CBF   within the paramedian right occipital lobe without Tmax elevation which   corresponds to old infarct on the noncontrast CT. There is no calculated   RAPID CBF volume < 30% deficit or elevated Tmax volume >6 seconds. There   is no mismatch deficit.  IMPRESSION: No reduced CBF or mismatch.        CT Angio Neck w/ IV Cont (18 @ 15:39)   IMPRESSION:  CTA COW:  1. No high-grade stenosis of the anterior circulation.  2. Multifocal areas of narrowing involving P1 segment of the right   posterior cerebral artery and near origin of right superior cerebellar   artery consistent with atherosclerotic changes in the posterior   circulation on the right.  CTA NECK:  1. No vertebral artery stenosis.   2. No carotid bifurcationstenosis.  3. 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

## 2018-06-03 DIAGNOSIS — R63.8 OTHER SYMPTOMS AND SIGNS CONCERNING FOOD AND FLUID INTAKE: ICD-10-CM

## 2018-06-03 LAB
ANION GAP SERPL CALC-SCNC: 12 MMOL/L — SIGNIFICANT CHANGE UP (ref 5–17)
BUN SERPL-MCNC: 12 MG/DL — SIGNIFICANT CHANGE UP (ref 7–23)
CALCIUM SERPL-MCNC: 8.7 MG/DL — SIGNIFICANT CHANGE UP (ref 8.4–10.5)
CHLORIDE SERPL-SCNC: 102 MMOL/L — SIGNIFICANT CHANGE UP (ref 96–108)
CHOLEST SERPL-MCNC: 236 MG/DL — HIGH (ref 10–199)
CO2 SERPL-SCNC: 24 MMOL/L — SIGNIFICANT CHANGE UP (ref 22–31)
CREAT SERPL-MCNC: 0.5 MG/DL — SIGNIFICANT CHANGE UP (ref 0.5–1.3)
GLUCOSE SERPL-MCNC: 112 MG/DL — HIGH (ref 70–99)
HBA1C BLD-MCNC: 6.3 % — HIGH (ref 4–5.6)
HCT VFR BLD CALC: 40.9 % — SIGNIFICANT CHANGE UP (ref 34.5–45)
HDLC SERPL-MCNC: 63 MG/DL — SIGNIFICANT CHANGE UP (ref 40–125)
HGB BLD-MCNC: 13 G/DL — SIGNIFICANT CHANGE UP (ref 11.5–15.5)
LIPID PNL WITH DIRECT LDL SERPL: 153 MG/DL — HIGH
MAGNESIUM SERPL-MCNC: 2.1 MG/DL — SIGNIFICANT CHANGE UP (ref 1.6–2.6)
MCHC RBC-ENTMCNC: 27 PG — SIGNIFICANT CHANGE UP (ref 27–34)
MCHC RBC-ENTMCNC: 31.8 G/DL — LOW (ref 32–36)
MCV RBC AUTO: 85 FL — SIGNIFICANT CHANGE UP (ref 80–100)
PLATELET # BLD AUTO: 207 K/UL — SIGNIFICANT CHANGE UP (ref 150–400)
POTASSIUM SERPL-MCNC: 4 MMOL/L — SIGNIFICANT CHANGE UP (ref 3.5–5.3)
POTASSIUM SERPL-SCNC: 4 MMOL/L — SIGNIFICANT CHANGE UP (ref 3.5–5.3)
RBC # BLD: 4.81 M/UL — SIGNIFICANT CHANGE UP (ref 3.8–5.2)
RBC # FLD: 14.6 % — SIGNIFICANT CHANGE UP (ref 10.3–16.9)
SODIUM SERPL-SCNC: 138 MMOL/L — SIGNIFICANT CHANGE UP (ref 135–145)
TOTAL CHOLESTEROL/HDL RATIO MEASUREMENT: 3.7 RATIO — SIGNIFICANT CHANGE UP (ref 3.3–7.1)
TRIGL SERPL-MCNC: 100 MG/DL — SIGNIFICANT CHANGE UP (ref 10–149)
WBC # BLD: 6.2 K/UL — SIGNIFICANT CHANGE UP (ref 3.8–10.5)
WBC # FLD AUTO: 6.2 K/UL — SIGNIFICANT CHANGE UP (ref 3.8–10.5)

## 2018-06-03 PROCEDURE — 99231 SBSQ HOSP IP/OBS SF/LOW 25: CPT

## 2018-06-03 RX ORDER — ASPIRIN/CALCIUM CARB/MAGNESIUM 324 MG
81 TABLET ORAL DAILY
Qty: 0 | Refills: 0 | Status: DISCONTINUED | OUTPATIENT
Start: 2018-06-03 | End: 2018-06-05

## 2018-06-03 RX ORDER — CLOPIDOGREL BISULFATE 75 MG/1
75 TABLET, FILM COATED ORAL DAILY
Qty: 0 | Refills: 0 | Status: DISCONTINUED | OUTPATIENT
Start: 2018-06-03 | End: 2018-06-05

## 2018-06-03 RX ADMIN — HEPARIN SODIUM 5000 UNIT(S): 5000 INJECTION INTRAVENOUS; SUBCUTANEOUS at 05:39

## 2018-06-03 RX ADMIN — ATORVASTATIN CALCIUM 40 MILLIGRAM(S): 80 TABLET, FILM COATED ORAL at 21:47

## 2018-06-03 RX ADMIN — HEPARIN SODIUM 5000 UNIT(S): 5000 INJECTION INTRAVENOUS; SUBCUTANEOUS at 21:47

## 2018-06-03 RX ADMIN — HEPARIN SODIUM 5000 UNIT(S): 5000 INJECTION INTRAVENOUS; SUBCUTANEOUS at 14:57

## 2018-06-03 RX ADMIN — CEFTRIAXONE 100 GRAM(S): 500 INJECTION, POWDER, FOR SOLUTION INTRAMUSCULAR; INTRAVENOUS at 00:34

## 2018-06-03 RX ADMIN — CLOPIDOGREL BISULFATE 75 MILLIGRAM(S): 75 TABLET, FILM COATED ORAL at 14:57

## 2018-06-03 RX ADMIN — Medication 81 MILLIGRAM(S): at 14:57

## 2018-06-03 NOTE — PHYSICAL THERAPY INITIAL EVALUATION ADULT - COORDINATION ASSESSED, REHAB EVAL
BLE intact heel to shin 3/3, BLE alternating toe tap intact 10/10/finger to nose/heel to shin finger to nose/heel to shin/BLE intact heel to shin 3/3, BLE alternating toe tap intact 10/10

## 2018-06-03 NOTE — PHYSICAL THERAPY INITIAL EVALUATION ADULT - PERTINENT HX OF CURRENT PROBLEM, REHAB EVAL
89F who presents from home with facial droop, aphasia after she was found be her doorman who called the ambulance. 89F who presents from home with facial droop, aphasia after she was found be her doorman who called the ambulance. CT head (+) Right PCA CVA

## 2018-06-03 NOTE — PHYSICAL THERAPY INITIAL EVALUATION ADULT - LIVES WITH, PROFILE
alone/apartment, elevator, no steps, independent prior to arrival, no falls in past year, no hha, has  10 hrs /  week

## 2018-06-03 NOTE — PROGRESS NOTE ADULT - PROBLEM SELECTOR PLAN 1
- patient presenting with signs and symptoms consistent with ischemic stroke. NIHSS of 5 on admission. Now s/p tPA at 15:28. CT head w/o hemorrhage.   - CTA head and neck with evidence of atherosclerosis and previous strokes, no opacities consistent with thrombus  - PT eval: home with home PT   - repeat head ct on 6/2/18 stable  - started lipitor 80   - start ASA 81 and plavix 75  - CARLA ordered Presented with aphasia, facial droop, L side pronator drift. NIHSS of 5 on admission. S/p TPA.   - CT head 6/1 negative for hemorrhage.   - CTA Head/Neck: atherosclerosis and previous strokes, no thrombus  - CT head 6/2 stable  - Echo with bubble negative for PFO  - Awaiting CARLA for clot in the BRISSA  - continue with lipitor 40  - continue ASA 81 and plavix 75 (can discontinue plavix after 3 months)  - PT eval: home with home PT

## 2018-06-03 NOTE — PHYSICAL THERAPY INITIAL EVALUATION ADULT - GENERAL OBSERVATIONS, REHAB EVAL
Patient received supine in NAD, denies pain +EKG, IV hep. Left in chair in NAD +RN Denise present, call winter in reach

## 2018-06-03 NOTE — PROGRESS NOTE ADULT - ATTENDING COMMENTS
back to baseline. CT head negative. UTI on uA - started on abx.  Repeat head CT negative
Patient is doing well. Ambulated twice with PT. Will get CARLA in am. if negative possible dc after loop  on asa and plavix

## 2018-06-03 NOTE — PHYSICAL THERAPY INITIAL EVALUATION ADULT - GAIT DEVIATIONS NOTED, PT EVAL
decreased elisabeth/decreased step length/R trendelenberg decreased elisabeth/R trendelenberg, SOB, sp02 > 95% throughout on room air/decreased step length

## 2018-06-03 NOTE — PROGRESS NOTE ADULT - SUBJECTIVE AND OBJECTIVE BOX
ACCEPTED FOR CARE ON [facility] (7EA TO 7WO)    HOSPITAL COURSE:  Patient is a 89 year old female with unknown past medical history who presented from home on  with facial droop, aphasia. In ED, NIHSS of 5 for facial droop, left sided drift and significant aphasia.  Head CT negative for hemorrhage. CTA showed narrowing of the P1 segment of the R PCA and the R superior cerebellar artery. Patient received TPA and was subsequently admitted to the MICU for post-TPA monitoring. Repeat CT head on  showed stable changes. Echo with doppler negative for PFO. Patient was started on ceftriaxone for UTI.     At present, patient has no focal deficits and is stable for step down to F.     SUBJECTIVE / INTERVAL HPI: Patient seen and examined at bedside.     VITAL SIGNS:  Vital Signs Last 24 Hrs  T(C): 37.6 (2018 14:00), Max: 37.6 (2018 14:00)  T(F): 99.6 (2018 14:00), Max: 99.6 (2018 14:00)  HR: 86 (2018 16:00) (66 - 92)  BP: 129/64 (2018 16:00) (76/46 - 159/80)  BP(mean): 94 (2018 16:00) (58 - 126)  RR: 18 (2018 16:00) (14 - 30)  SpO2: 95% (2018 16:00) (92% - 98%)    PHYSICAL EXAM:  General: WDWN  HEENT: NC/AT; PERRL, anicteric sclera; MMM  Neck: supple  Cardiovascular: +S1/S2; RRR  Respiratory: CTA B/L; no W/R/R  Gastrointestinal: soft, NT/ND; +BSx4  Extremities: WWP; no edema, clubbing or cyanosis  Vascular: 2+ radial, DP/PT pulses B/L  Neurological: AAOx3; no focal deficits    MEDICATIONS:  MEDICATIONS  (STANDING):  aspirin enteric coated 81 milliGRAM(s) Oral daily  atorvastatin 40 milliGRAM(s) Oral at bedtime  cefTRIAXone   IVPB 1 Gram(s) IV Intermittent every 24 hours  clopidogrel Tablet 75 milliGRAM(s) Oral daily  heparin  Injectable 5000 Unit(s) SubCutaneous every 8 hours    ALLERGIES:  No Known Allergies or Intolerances    LABS:                        13.0   6.2   )-----------( 207      ( 2018 04:40 )             40.9     06-03    138  |  102  |  12  ----------------------------<  112<H>  4.0   |  24  |  0.50    Ca    8.7      2018 04:40  Mg     2.1     06-03      PT/INR - ( 2018 06:51 )   PT: 11.4 sec;   INR: 1.03      PTT - ( 2018 06:51 )  PTT:25.2 sec  Urinalysis Basic - ( 2018 01:00 )    Color: Yellow / Appearance: Clear / S.010 / pH: x  Gluc: x / Ketone: NEGATIVE  / Bili: Negative / Urobili: 0.2 E.U./dL   Blood: x / Protein: NEGATIVE mg/dL / Nitrite: POSITIVE   Leuk Esterase: Small / RBC: < 5 /HPF / WBC > 10 /HPF   Sq Epi: x / Non Sq Epi: 0-5 /HPF / Bacteria: Many /HPF      CAPILLARY BLOOD GLUCOSE  POCT Blood Glucose.: 126 mg/dL (2018 06:36)      RADIOLOGY & ADDITIONAL TESTS:     CT Head No Cont (18 @ 18:18)   IMPRESSION:   No acute intracranial hemorrhage or evidence of recent transcortical   infarction. Stable examination since 2018.      Echo W Bubble w/o Doppler Complete (18 @ 13:37)   Limited bubble study: No interatrial shunting appreciated.    Left ventricular hypertrophy present. The left ventricular ejection   fraction is estimated to be 60% The left atrial size is normal. No hemodynamically   significant valvular aortic stenosis. Mitral annular calcification   noted. There is trace mitral regurgitation. There is trace tricuspid   regurgitation. There was insufficient TR detected from which to calculate pulmonary artery   systolic pressure.  No pulmonic regurgitation noted. The inferior vena cava is   normal in size (<2.1 cm) with normal inspiratory collapse (>50%) consistent with   normal right atrial pressure.  Heterogenous space anterior to the heart; likely   prominent epicardial fat, cannot rule out small pericardial effusion. ACCEPTED FOR CARE ON [facility] (7EA TO 7WO)    HOSPITAL COURSE:  Patient is a 89 year old female with unknown past medical history who presented from home on  with facial droop, aphasia. In ED, NIHSS of 5 for facial droop, left sided drift and significant aphasia.  Head CT negative for hemorrhage. CTA showed narrowing of the P1 segment of the R PCA and the R superior cerebellar artery. Patient received TPA and was subsequently admitted to the MICU for post-TPA monitoring. Repeat CT head on  showed stable changes. Echo with doppler negative for PFO. Patient was started on ceftriaxone for UTI.     At present, patient has no focal deficits and is stable for step down to F.     SUBJECTIVE / INTERVAL HPI: Patient seen and examined at bedside.     VITAL SIGNS:  Vital Signs Last 24 Hrs  T(C): 37.6 (2018 14:00), Max: 37.6 (2018 14:00)  T(F): 99.6 (2018 14:00), Max: 99.6 (2018 14:00)  HR: 86 (2018 16:00) (66 - 92)  BP: 129/64 (2018 16:00) (76/46 - 159/80)  BP(mean): 94 (2018 16:00) (58 - 126)  RR: 18 (2018 16:00) (14 - 30)  SpO2: 95% (2018 16:00) (92% - 98%)    PHYSICAL EXAM:  General: WDWN  HEENT: NC/AT; PERRL, anicteric sclera; MMM  Neck: supple  Cardiovascular: +S1/S2; RRR  Respiratory: CTA B/L; no W/R/R  Gastrointestinal: soft, NT/ND; +BSx4  Extremities: WWP; no edema, clubbing or cyanosis  Vascular: 2+ radial, DP/PT pulses B/L  Neurological: AAOx3; no focal deficits    MEDICATIONS:  MEDICATIONS  (STANDING):  aspirin enteric coated 81 milliGRAM(s) Oral daily  atorvastatin 40 milliGRAM(s) Oral at bedtime  cefTRIAXone   IVPB 1 Gram(s) IV Intermittent every 24 hours  clopidogrel Tablet 75 milliGRAM(s) Oral daily  heparin  Injectable 5000 Unit(s) SubCutaneous every 8 hours    ALLERGIES:  No Known Allergies or Intolerances    LABS:                        13.0   6.2   )-----------( 207      ( 2018 04:40 )             40.9     06-03    138  |  102  |  12  ----------------------------<  112<H>  4.0   |  24  |  0.50    Ca    8.7      2018 04:40  Mg     2.1     06-03    PT/INR - ( 2018 06:51 )   PT: 11.4 sec;   INR: 1.03      PTT - ( 2018 06:51 )  PTT:25.2 sec  Urinalysis Basic - ( 2018 01:00 )    Color: Yellow / Appearance: Clear / S.010 / pH: x  Gluc: x / Ketone: NEGATIVE  / Bili: Negative / Urobili: 0.2 E.U./dL   Blood: x / Protein: NEGATIVE mg/dL / Nitrite: POSITIVE   Leuk Esterase: Small / RBC: < 5 /HPF / WBC > 10 /HPF   Sq Epi: x / Non Sq Epi: 0-5 /HPF / Bacteria: Many /HPF    CAPILLARY BLOOD GLUCOSE  POCT Blood Glucose.: 126 mg/dL (2018 06:36)    RADIOLOGY & ADDITIONAL TESTS:     CT Head No Cont (18 @ 18:18)   IMPRESSION:   No acute intracranial hemorrhage or evidence of recent transcortical   infarction. Stable examination since 2018.      Echo W Bubble w/o Doppler Complete (18 @ 13:37)   Limited bubble study: No interatrial shunting appreciated.    Left ventricular hypertrophy present. The left ventricular ejection   fraction is estimated to be 60% The left atrial size is normal. No hemodynamically   significant valvular aortic stenosis. Mitral annular calcification   noted. There is trace mitral regurgitation. There is trace tricuspid   regurgitation. There was insufficient TR detected from which to calculate pulmonary artery   systolic pressure.  No pulmonic regurgitation noted. The inferior vena cava is   normal in size (<2.1 cm) with normal inspiratory collapse (>50%) consistent with   normal right atrial pressure.  Heterogenous space anterior to the heart; likely   prominent epicardial fat, cannot rule out small pericardial effusion. ACCEPTED FOR CARE ON [facility] (7EA TO 7WO)    HOSPITAL COURSE:  Patient is a 89 year old female with unknown past medical history who presented from home on  with facial droop, aphasia. In ED, NIHSS of 5 for facial droop, left sided drift and significant aphasia.  Head CT negative for hemorrhage. CTA showed narrowing of the P1 segment of the R PCA and the R superior cerebellar artery. Patient received TPA and was subsequently admitted to the MICU for post-TPA monitoring. Repeat CT head on  showed stable changes. Echo with doppler negative for PFO. Patient was started on ceftriaxone for UTI.     At present, patient has no focal deficits and is stable for step down to [facility].     SUBJECTIVE / INTERVAL HPI: Patient seen and examined at bedside. Patient is sitting in bed reading [employer] and [employer] magazines. She is pleasant to converse with and expresses herself eloquently. She explains that she's finally "feeling herself" today. She denies any headaches, visual changes, weakness, subjective perception of dysarthria, dizziness, sensory changes, ongoing dysuria/frequency or urgency.      VITAL SIGNS:  Vital Signs Last 24 Hrs  T(C): 37.6 (2018 14:00), Max: 37.6 (2018 14:00)  T(F): 99.6 (2018 14:00), Max: 99.6 (2018 14:00)  HR: 86 (2018 16:00) (66 - 92)  BP: 129/64 (2018 16:00) (76/46 - 159/80)  BP(mean): 94 (2018 16:00) (58 - 126)  RR: 18 (2018 16:00) (14 - 30)  SpO2: 95% (2018 16:00) (92% - 98%)    PHYSICAL EXAM:  General: WDWN  HEENT: NC/AT; PERRL, anicteric sclera; MMM  Neck: supple  Cardiovascular: +S1/S2; RRR  Respiratory: CTA B/L; no W/R/R  Gastrointestinal: soft, NT/ND; +BSx4  Extremities: WWP; no edema, clubbing or cyanosis  Vascular: 2+ radial, DP/PT pulses B/L  Neurological: AAOx3; no focal deficits    MEDICATIONS:  MEDICATIONS  (STANDING):  aspirin enteric coated 81 milliGRAM(s) Oral daily  atorvastatin 40 milliGRAM(s) Oral at bedtime  cefTRIAXone   IVPB 1 Gram(s) IV Intermittent every 24 hours  clopidogrel Tablet 75 milliGRAM(s) Oral daily  heparin  Injectable 5000 Unit(s) SubCutaneous every 8 hours    ALLERGIES:  No Known Allergies or Intolerances    LABS:                        13.0   6.2   )-----------( 207      ( 2018 04:40 )             40.9     06-03    138  |  102  |  12  ----------------------------<  112<H>  4.0   |  24  |  0.50    Ca    8.7      2018 04:40  Mg     2.1     06-03    PT/INR - ( 2018 06:51 )   PT: 11.4 sec;   INR: 1.03      PTT - ( 2018 06:51 )  PTT:25.2 sec  Urinalysis Basic - ( 2018 01:00 )    Color: Yellow / Appearance: Clear / S.010 / pH: x  Gluc: x / Ketone: NEGATIVE  / Bili: Negative / Urobili: 0.2 E.U./dL   Blood: x / Protein: NEGATIVE mg/dL / Nitrite: POSITIVE   Leuk Esterase: Small / RBC: < 5 /HPF / WBC > 10 /HPF   Sq Epi: x / Non Sq Epi: 0-5 /HPF / Bacteria: Many /HPF    CAPILLARY BLOOD GLUCOSE  POCT Blood Glucose.: 126 mg/dL (2018 06:36)    RADIOLOGY & ADDITIONAL TESTS:     CT Head No Cont (18 @ 18:18)   IMPRESSION:   No acute intracranial hemorrhage or evidence of recent transcortical   infarction. Stable examination since 2018.      Echo W Bubble w/o Doppler Complete (18 @ 13:37)   Limited bubble study: No interatrial shunting appreciated.    Left ventricular hypertrophy present. The left ventricular ejection   fraction is estimated to be 60% The left atrial size is normal. No hemodynamically   significant valvular aortic stenosis. Mitral annular calcification   noted. There is trace mitral regurgitation. There is trace tricuspid   regurgitation. There was insufficient TR detected from which to calculate pulmonary artery   systolic pressure.  No pulmonic regurgitation noted. The inferior vena cava is   normal in size (<2.1 cm) with normal inspiratory collapse (>50%) consistent with   normal right atrial pressure.  Heterogenous space anterior to the heart; likely   prominent epicardial fat, cannot rule out small pericardial effusion. ACCEPTED FOR CARE ON [facility] (7EA TO 7WO)    HOSPITAL COURSE:  Patient is a 89 year old female with unknown past medical history who presented from home on  with facial droop, aphasia. In ED, NIHSS of 5 for facial droop, left sided drift and significant aphasia.  Head CT negative for hemorrhage. CTA showed narrowing of the P1 segment of the R PCA and the R superior cerebellar artery. Patient received TPA and was subsequently admitted to the MICU for post-TPA monitoring. Repeat CT head on  showed stable changes. Echo with doppler negative for PFO. Patient was started on ceftriaxone for UTI.     At present, patient has no focal deficits and is stable for step down to [facility] while awaits CARLA.     SUBJECTIVE / INTERVAL HPI: Patient seen and examined at bedside. Patient is sitting in bed reading [place] [employer] and [employer] magazines. She is pleasant to converse with and expresses herself eloquently. She explains that she's finally "feeling herself" today. She denies any headaches, visual changes, weakness, subjective perception of dysarthria, dizziness, sensory changes, ongoing dysuria/frequency or urgency.      VITAL SIGNS:  Vital Signs Last 24 Hrs  T(C): 37.6 (2018 14:00), Max: 37.6 (2018 14:00)  T(F): 99.6 (2018 14:00), Max: 99.6 (2018 14:00)  HR: 86 (2018 16:00) (66 - 92)  BP: 129/64 (2018 16:00) (76/46 - 159/80)  BP(mean): 94 (2018 16:00) (58 - 126)  RR: 18 (2018 16:00) (14 - 30)  SpO2: 95% (2018 16:00) (92% - 98%)    PHYSICAL EXAM:  General: elderly female laying in bed in NAD, comfortably breathing room air  HEENT: NC/AT; PERRL, anicteric sclera; MMM, facial muscles intact and symmetric without sign of facial droop  Neck: supple  Cardiovascular: +S1/S2; RRR  Respiratory: CTAB with shallow breaths  Gastrointestinal: soft, NT/ND; +BSx4  Extremities: WWP; no clubbing or cyanosis. 2+ swelling worse on L than R calf. Scar over left knee where patient had recent knee replacement.  Vascular: 2+ radial, DP/PT pulses B/L  Neurological: AAOx3; no focal deficits. CN2-12 grossly intact: facial muscles intact and symmetric. Strength 5/5 throughout, sensation intact throughout, no dysarthria, dysmetria, no dysdiadokokinesis.     MEDICATIONS:  MEDICATIONS  (STANDING):  aspirin enteric coated 81 milliGRAM(s) Oral daily  atorvastatin 40 milliGRAM(s) Oral at bedtime  cefTRIAXone   IVPB 1 Gram(s) IV Intermittent every 24 hours  clopidogrel Tablet 75 milliGRAM(s) Oral daily  heparin  Injectable 5000 Unit(s) SubCutaneous every 8 hours    ALLERGIES:  No Known Allergies or Intolerances    LABS:                        13.0   6.2   )-----------( 207      ( 2018 04:40 )             40.9     06-03    138  |  102  |  12  ----------------------------<  112<H>  4.0   |  24  |  0.50    Ca    8.7      2018 04:40  Mg     2.1     06-03    PT/INR - ( 2018 06:51 )   PT: 11.4 sec;   INR: 1.03      PTT - ( 2018 06:51 )  PTT:25.2 sec  Urinalysis Basic - ( 2018 01:00 )    Color: Yellow / Appearance: Clear / S.010 / pH: x  Gluc: x / Ketone: NEGATIVE  / Bili: Negative / Urobili: 0.2 E.U./dL   Blood: x / Protein: NEGATIVE mg/dL / Nitrite: POSITIVE   Leuk Esterase: Small / RBC: < 5 /HPF / WBC > 10 /HPF   Sq Epi: x / Non Sq Epi: 0-5 /HPF / Bacteria: Many /HPF    CAPILLARY BLOOD GLUCOSE  POCT Blood Glucose.: 126 mg/dL (2018 06:36)    RADIOLOGY & ADDITIONAL TESTS:     CT Head No Cont (18 @ 18:18)   IMPRESSION:   No acute intracranial hemorrhage or evidence of recent transcortical   infarction. Stable examination since 2018.      Echo W Bubble w/o Doppler Complete (18 @ 13:37)   Limited bubble study: No interatrial shunting appreciated.    Left ventricular hypertrophy present. The left ventricular ejection   fraction is estimated to be 60% The left atrial size is normal. No hemodynamically   significant valvular aortic stenosis. Mitral annular calcification   noted. There is trace mitral regurgitation. There is trace tricuspid   regurgitation. There was insufficient TR detected from which to calculate pulmonary artery   systolic pressure.  No pulmonic regurgitation noted. The inferior vena cava is   normal in size (<2.1 cm) with normal inspiratory collapse (>50%) consistent with   normal right atrial pressure.  Heterogenous space anterior to the heart; likely   prominent epicardial fat, cannot rule out small pericardial effusion.

## 2018-06-03 NOTE — PHYSICAL THERAPY INITIAL EVALUATION ADULT - MODALITIES TREATMENT COMMENTS
Facial nerve: smile/cheek puff/ eyebrow elevation symmetrical; Tongue: protrudes to R; Visual tracking: all fields intact

## 2018-06-03 NOTE — PROGRESS NOTE ADULT - PROBLEM SELECTOR PLAN 4
-resolved  -likely 2/2 UTI F: no IVF, encourage po  E: Replete PRN  N: DASH  Ppx: HSQ, SCD  Code: full   Dispo: [facility] pending results of CARLA, discharge with follow up with Dr. Luna

## 2018-06-03 NOTE — DIETITIAN INITIAL EVALUATION ADULT. - OTHER INFO
89yo F w/ unknown past medical history who p/w aphasia, confusion, left sided droop and NIHSS of 5 -s/s consistent w/ ischemic stroke. Now s/p tPA at 15:28. Pt seen OOB in chair eating lunch. Currently on a regular diet and tolerating PO. Endorses good appetite, reports consuming at least 75% meals. Denies N/V/D/C, no issues chewing or swallowing. At home pt reports incorporating fruits and vegetables into each meal and opting for lean meats/fish. NFKA or dietary restrictions. Wt stable. Skin and GI WDL per flowsheet.

## 2018-06-03 NOTE — PROGRESS NOTE ADULT - PROBLEM SELECTOR PLAN 1
- patient presenting with signs and symptoms consistent with ischemic stroke. NIHSS of 5 on admission. Now s/p tPA at 15:28. CT head w/o hemorrhage.   - CTA head and neck with evidence of atherosclerosis and previous strokes, no opacities consistent with thrombus  - PT eval: home with home PT   - repeat head ct on 6/2/18 stable  - monitor on telemetry for arrythmia  - started lipitor 80   - start ASA 81 and plavix 75  - CARLA ordered - patient presenting with signs and symptoms consistent with ischemic stroke. NIHSS of 5 on admission. Now s/p tPA at 15:28. CT head w/o hemorrhage.   - CTA head and neck with evidence of atherosclerosis and previous strokes, no opacities consistent with thrombus  - PT eval: home with home PT   - repeat head ct on 6/2/18 stable  - started lipitor 80   - start ASA 81 and plavix 75  - CARLA ordered

## 2018-06-03 NOTE — PROGRESS NOTE ADULT - SUBJECTIVE AND OBJECTIVE BOX
INTERVAL HPI/OVERNIGHT EVENTS: ALMAZ o/n    SUBJECTIVE: Patient seen and examined at bedside. No complaints. No new neurologic symptoms.     OBJECTIVE:    VITAL SIGNS:  ICU Vital Signs Last 24 Hrs  T(C): 36.7 (2018 06:26), Max: 37.1 (2018 17:50)  T(F): 98 (2018 06:26), Max: 98.8 (2018 17:50)  HR: 80 (2018 11:00) (66 - 92)  BP: 120/55 (2018 11:00) (76/46 - 159/80)  BP(mean): 69 (2018 11:00) (58 - 126)  ABP: --  ABP(mean): --  RR: 29 (2018 11:00) (14 - 30)  SpO2: 96% (2018 11:00) (92% - 98%)        06-02 @ 07:01  -  06-03 @ 07:00  --------------------------------------------------------  IN: 520 mL / OUT: 1380 mL / NET: -860 mL      CAPILLARY BLOOD GLUCOSE  152 (2018 16:37)      POCT Blood Glucose.: 126 mg/dL (2018 06:36)      PHYSICAL EXAM:    General: NAD, resting comfortably in chair  HEENT: NC/AT; PERRL, clear conjunctiva, MMM  Respiratory: CTA b/l  Cardiovascular: +S1/S2; RRR, no m/r/g  Abdomen: soft, NT/ND; +BS x4  Extremities: WWP,   Vascular: 2+ peripheral pulses b/l  Skin: normal color and turgor; no rash  Neurological: A&ox3. CN 2-12 intact. No dysarthria or word finding difficulty. Able to do serial 7s. Motor strength 5/5 UE proximal/distal/hands b/l. MS 5/5 LE proximal distal b/l. Sensation grossly preserved throughout. no new gait disturbance. cerebellar testing intact.     MEDICATIONS:  MEDICATIONS  (STANDING):  atorvastatin 40 milliGRAM(s) Oral at bedtime  cefTRIAXone   IVPB 1 Gram(s) IV Intermittent every 24 hours  heparin  Injectable 5000 Unit(s) SubCutaneous every 8 hours    MEDICATIONS  (PRN):      ALLERGIES:  Allergies    No Known Allergies    Intolerances        LABS:                        13.0   6.2   )-----------( 207      ( 2018 04:40 )             40.9     06-03    138  |  102  |  12  ----------------------------<  112<H>  4.0   |  24  |  0.50    Ca    8.7      2018 04:40  Mg     2.1     -    TPro  6.4  /  Alb  3.9  /  TBili  0.6  /  DBili  x   /  AST  18  /  ALT  11  /  AlkPhos  77  06-    PT/INR - ( 2018 06:51 )   PT: 11.4 sec;   INR: 1.03          PTT - ( 2018 06:51 )  PTT:25.2 sec  Urinalysis Basic - ( 2018 01:00 )    Color: Yellow / Appearance: Clear / S.010 / pH: x  Gluc: x / Ketone: NEGATIVE  / Bili: Negative / Urobili: 0.2 E.U./dL   Blood: x / Protein: NEGATIVE mg/dL / Nitrite: POSITIVE   Leuk Esterase: Small / RBC: < 5 /HPF / WBC > 10 /HPF   Sq Epi: x / Non Sq Epi: 0-5 /HPF / Bacteria: Many /HPF        RADIOLOGY & ADDITIONAL TESTS:     CT Head No Cont (18 @ 18:18)   IMPRESSION:     No acute intracranial hemorrhage or evidence of recent transcortical   infarction. Stable examination since 2018. 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 On arrival to the ED stroke code was called and patient was found to have NIHSS of 5 for facial droop, left sided drift and significant aphasia.  Head CT showed no acute bleed. CTA showed narrowing involving P1 segment of the right posterior cerebral artery and near origin of right superior cerebellar artery consistent with atherosclerotic changes in the posterior circulation on the right.  TPA was administered at 15:28 on 18. Patient admitted to MICU per tPA protocol. Overnight, patient was found to have urinary retention of 1L urine, UA remarkable for nitrite/leuk esterase, ceftriaxone was started. At present, her presenting neurological deficits has resolved. Repeat head CT on 18 is stable. Echo with bubble significant for LVH with EF 60%. Patient is stepped down to [facility] for further stroke work-up.    INTERVAL HPI/OVERNIGHT EVENTS: ALMAZ o/n    SUBJECTIVE: Patient seen and examined at bedside. No complaints. No new neurologic symptoms.     OBJECTIVE:    VITAL SIGNS:  ICU Vital Signs Last 24 Hrs  T(C): 36.7 (2018 06:26), Max: 37.1 (2018 17:50)  T(F): 98 (2018 06:26), Max: 98.8 (2018 17:50)  HR: 80 (2018 11:00) (66 - 92)  BP: 120/55 (2018 11:00) (76/46 - 159/80)  BP(mean): 69 (2018 11:00) (58 - 126)  ABP: --  ABP(mean): --  RR: 29 (2018 11:00) (14 - 30)  SpO2: 96% (2018 11:00) (92% - 98%)        06-02 @ 07:01  -  -03 @ 07:00  --------------------------------------------------------  IN: 520 mL / OUT: 1380 mL / NET: -860 mL      CAPILLARY BLOOD GLUCOSE  152 (2018 16:37)      POCT Blood Glucose.: 126 mg/dL (2018 06:36)      PHYSICAL EXAM:    General: NAD, resting comfortably in chair  HEENT: NC/AT; PERRL, clear conjunctiva, MMM  Respiratory: CTA b/l  Cardiovascular: +S1/S2; RRR, no m/r/g  Abdomen: soft, NT/ND; +BS x4  Extremities: WWP,   Vascular: 2+ peripheral pulses b/l  Skin: normal color and turgor; no rash  Neurological: A&ox3. CN 2-12 intact. No dysarthria or word finding difficulty. Able to do serial 7s. Motor strength 5/5 UE proximal/distal/hands b/l. MS 5/5 LE proximal distal b/l. Sensation grossly preserved throughout. no new gait disturbance. cerebellar testing intact.     MEDICATIONS:  MEDICATIONS  (STANDING):  atorvastatin 40 milliGRAM(s) Oral at bedtime  cefTRIAXone   IVPB 1 Gram(s) IV Intermittent every 24 hours  heparin  Injectable 5000 Unit(s) SubCutaneous every 8 hours    MEDICATIONS  (PRN):      ALLERGIES:  Allergies    No Known Allergies    Intolerances        LABS:                        13.0   6.2   )-----------( 207      ( 2018 04:40 )             40.9     06-03    138  |  102  |  12  ----------------------------<  112<H>  4.0   |  24  |  0.50    Ca    8.7      2018 04:40  Mg     2.1     06-03    TPro  6.4  /  Alb  3.9  /  TBili  0.6  /  DBili  x   /  AST  18  /  ALT  11  /  AlkPhos  77  06-01    PT/INR - ( 2018 06:51 )   PT: 11.4 sec;   INR: 1.03          PTT - ( 2018 06:51 )  PTT:25.2 sec  Urinalysis Basic - ( 2018 01:00 )    Color: Yellow / Appearance: Clear / S.010 / pH: x  Gluc: x / Ketone: NEGATIVE  / Bili: Negative / Urobili: 0.2 E.U./dL   Blood: x / Protein: NEGATIVE mg/dL / Nitrite: POSITIVE   Leuk Esterase: Small / RBC: < 5 /HPF / WBC > 10 /HPF   Sq Epi: x / Non Sq Epi: 0-5 /HPF / Bacteria: Many /HPF        RADIOLOGY & ADDITIONAL TESTS:     CT Head No Cont (18 @ 18:18)   IMPRESSION:     No acute intracranial hemorrhage or evidence of recent transcortical   infarction. Stable examination since 2018.

## 2018-06-03 NOTE — PROGRESS NOTE ADULT - PROBLEM SELECTOR PLAN 2
-UA mildly positive, no suprapubic tenderness  -started ceftriaxone 1g qd (6/2 - present) -continue with ceftriaxone 1g qd (6/2 - 6/4) Currently DAY 2/3.

## 2018-06-03 NOTE — PROGRESS NOTE ADULT - PROBLEM SELECTOR PLAN 5
F: no IVF  E: replete prn  N: dash/tlc  full code  dispo: 7la F: no IVF  E: replete prn  N: dash/tlc  full code  dispo: [facility]

## 2018-06-03 NOTE — PHYSICAL THERAPY INITIAL EVALUATION ADULT - CRITERIA FOR SKILLED THERAPEUTIC INTERVENTIONS
therapy frequency/risk reduction/prevention/anticipated discharge recommendation/functional limitations in following categories/rehab potential/impairments found

## 2018-06-04 ENCOUNTER — TRANSCRIPTION ENCOUNTER (OUTPATIENT)
Age: 83
End: 2018-06-04

## 2018-06-04 LAB
-  AMPICILLIN/SULBACTAM: SIGNIFICANT CHANGE UP
-  AMPICILLIN/SULBACTAM: SIGNIFICANT CHANGE UP
-  AMPICILLIN: SIGNIFICANT CHANGE UP
-  AMPICILLIN: SIGNIFICANT CHANGE UP
-  CEFAZOLIN: SIGNIFICANT CHANGE UP
-  CEFAZOLIN: SIGNIFICANT CHANGE UP
-  CEFTRIAXONE: SIGNIFICANT CHANGE UP
-  CEFTRIAXONE: SIGNIFICANT CHANGE UP
-  CIPROFLOXACIN: SIGNIFICANT CHANGE UP
-  CIPROFLOXACIN: SIGNIFICANT CHANGE UP
-  GENTAMICIN: SIGNIFICANT CHANGE UP
-  GENTAMICIN: SIGNIFICANT CHANGE UP
-  NITROFURANTOIN: SIGNIFICANT CHANGE UP
-  NITROFURANTOIN: SIGNIFICANT CHANGE UP
-  PIPERACILLIN/TAZOBACTAM: SIGNIFICANT CHANGE UP
-  PIPERACILLIN/TAZOBACTAM: SIGNIFICANT CHANGE UP
-  TOBRAMYCIN: SIGNIFICANT CHANGE UP
-  TOBRAMYCIN: SIGNIFICANT CHANGE UP
-  TRIMETHOPRIM/SULFAMETHOXAZOLE: SIGNIFICANT CHANGE UP
-  TRIMETHOPRIM/SULFAMETHOXAZOLE: SIGNIFICANT CHANGE UP
CULTURE RESULTS: SIGNIFICANT CHANGE UP
CULTURE RESULTS: SIGNIFICANT CHANGE UP
METHOD TYPE: SIGNIFICANT CHANGE UP
METHOD TYPE: SIGNIFICANT CHANGE UP
ORGANISM # SPEC MICROSCOPIC CNT: SIGNIFICANT CHANGE UP
SPECIMEN SOURCE: SIGNIFICANT CHANGE UP
SPECIMEN SOURCE: SIGNIFICANT CHANGE UP

## 2018-06-04 PROCEDURE — 93312 ECHO TRANSESOPHAGEAL: CPT | Mod: 26

## 2018-06-04 PROCEDURE — 93325 DOPPLER ECHO COLOR FLOW MAPG: CPT | Mod: 26

## 2018-06-04 PROCEDURE — 93320 DOPPLER ECHO COMPLETE: CPT | Mod: 26

## 2018-06-04 PROCEDURE — 99231 SBSQ HOSP IP/OBS SF/LOW 25: CPT

## 2018-06-04 PROCEDURE — 70551 MRI BRAIN STEM W/O DYE: CPT | Mod: 26

## 2018-06-04 PROCEDURE — 93970 EXTREMITY STUDY: CPT | Mod: 26

## 2018-06-04 RX ORDER — CLOPIDOGREL BISULFATE 75 MG/1
1 TABLET, FILM COATED ORAL
Qty: 90 | Refills: 0 | OUTPATIENT
Start: 2018-06-04 | End: 2018-09-01

## 2018-06-04 RX ORDER — ASPIRIN/CALCIUM CARB/MAGNESIUM 324 MG
1 TABLET ORAL
Qty: 30 | Refills: 0 | OUTPATIENT
Start: 2018-06-04 | End: 2018-07-03

## 2018-06-04 RX ORDER — ATORVASTATIN CALCIUM 80 MG/1
1 TABLET, FILM COATED ORAL
Qty: 30 | Refills: 0 | OUTPATIENT
Start: 2018-06-04 | End: 2018-07-03

## 2018-06-04 RX ORDER — LANOLIN ALCOHOL/MO/W.PET/CERES
5 CREAM (GRAM) TOPICAL AT BEDTIME
Qty: 0 | Refills: 0 | Status: DISCONTINUED | OUTPATIENT
Start: 2018-06-04 | End: 2018-06-05

## 2018-06-04 RX ADMIN — Medication 5 MILLIGRAM(S): at 00:58

## 2018-06-04 RX ADMIN — Medication 81 MILLIGRAM(S): at 12:22

## 2018-06-04 RX ADMIN — CLOPIDOGREL BISULFATE 75 MILLIGRAM(S): 75 TABLET, FILM COATED ORAL at 12:22

## 2018-06-04 RX ADMIN — HEPARIN SODIUM 5000 UNIT(S): 5000 INJECTION INTRAVENOUS; SUBCUTANEOUS at 21:49

## 2018-06-04 RX ADMIN — Medication 5 MILLIGRAM(S): at 23:19

## 2018-06-04 RX ADMIN — ATORVASTATIN CALCIUM 40 MILLIGRAM(S): 80 TABLET, FILM COATED ORAL at 21:49

## 2018-06-04 RX ADMIN — CEFTRIAXONE 100 GRAM(S): 500 INJECTION, POWDER, FOR SOLUTION INTRAMUSCULAR; INTRAVENOUS at 01:01

## 2018-06-04 NOTE — PROGRESS NOTE ADULT - PROBLEM SELECTOR PLAN 4
F: no IVF, encourage po  E: Replete PRN  N: DASH  Ppx: HSQ, SCD  Code: full   Dispo: [facility] pending results of CARLA, discharge with follow up with Dr. Luna F: no IVF, encourage po  E: Replete PRN  N: NPO for CARLA  Ppx: HSQ, SCD  Code: full   Dispo: [facility] pending results of CARLA, discharge with follow up with Dr. Luna

## 2018-06-04 NOTE — DISCHARGE NOTE ADULT - PLAN OF CARE
To minimize the risk of future re-occurrence You presented to the hospital with difficulty speaking and abnormalities in your facial muscles. This was due to a stroke. After a stroke, medications and lifestyle changes can help reduce the risk of future re-occurrence. Please continue to take your aspirin daily, as well as your plavix and lipitor daily. These medications will help to reduce the risk of developing another stroke. You can discontinue the plavix after three months. Please follow up with Dr. Lillie Luna, who will continue to monitor your neurologic health. If you have a repeat episode of acute onset weakness, sudden onset severe headache, visual changes, sensory changes in one side of your body, dizziness or difficulty speaking, please report to the emergency room for immediate attention. While you were in the hospital you were treated for an infection in your urine. Sometimes, these infections can cause changes in your mental acuity. You can become forgetful, or start to behave unlike your usual self. This can be dangerous because you can hurt yourself by falling or by making unsafe decisions while in this state. If you begin to feel burning sensation with urinating, that you are urinating more frequently or that urination is urgent, you may require treatment for another urinary infection. You completed treatment before you left the hospital and will not need to take additional medications upon discharge. 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 This can cause you to be sedentary. While sedentary, your body can create blood clots that travel throughout your vascular system and can get caught in the lungs, limbs or brain. Please try to walk (if able, safely, with help- if necessary), stretch, or wear compression stockings to reduce the risk of clot formation. While you were in the hospital you were briefly unaware of your surroundings and basic information that you know when you are fully yourself. This may have been due to your stroke, or your urine infection. This state resolved with stroke and treatment for your urine infection. Please continue to watch for signs of acute mental status changes (and tell loved ones to be aware as well) and report to the hospital for immediate attention if you start to notice changes. You presented to the hospital with difficulty speaking and abnormalities in your facial muscles. This was due to a stroke. After a stroke, medications and lifestyle changes can help reduce the risk of future re-occurrence. Please continue to take your aspirin daily, as well as your plavix and lipitor daily. These medications will help to reduce the risk of developing another stroke. You can discontinue the plavix after three months. Please follow up with Dr. Lillie Luna, who will continue to monitor your neurologic health. If you have a repeat episode of acute onset weakness, sudden onset severe headache, visual changes, sensory changes in one side of your body, dizziness or difficulty speaking, please report to the emergency room for immediate attention. You will need to follow up to get an MRI of your head after you are discharged from the hospital. You presented to the hospital with difficulty speaking and abnormalities in your facial muscles. This was due to a stroke. After a stroke, medications and lifestyle changes can help reduce the risk of future re-occurrence. Please continue to take your aspirin daily, as well as your plavix and lipitor daily. These medications will help to reduce the risk of developing another stroke. You can discontinue the plavix after three months. Please follow up with Dr. Lillie Luna, who will continue to monitor your neurologic health. If you have a repeat episode of acute onset weakness, sudden onset severe headache, visual changes, sensory changes in one side of your body, dizziness or difficulty speaking, please report to the emergency room for immediate attention.    MRI results: MRI head showed tiny left cerebellar acute to subacute infarction as well as a right occipital   chronic infarction. Moderate microvascular disease.

## 2018-06-04 NOTE — DISCHARGE NOTE ADULT - HOSPITAL COURSE
Patient is a 89 year old female with unknown past medical history who presented from home on 6/1 with facial droop and aphasia. In ED, NIHSS of 5 for facial droop, left sided drift and significant aphasia.  Head CT negative for hemorrhage. CTA showed narrowing of the P1 segment of the R PCA and the R superior cerebellar artery. Patient received TPA and was subsequently admitted to the MICU for post-TPA monitoring. Patient deficits fully resolved and repeat CT head on 6/2 showed stable changes. Echo with doppler negative for PFO. Patient was started on ceftriaxone for UTI, completed treatment while inpatient.       Workup for thromboembolic cause of event with CARLA and lower extremity dopplers showed. Patient will follow up as an outpatient for MRI head. Patient is a 89 year old female with unknown past medical history who presented from home on 6/1 with facial droop and aphasia. In ED, NIHSS of 5 for facial droop, left sided drift and significant aphasia.  Head CT negative for hemorrhage. CTA showed narrowing of the P1 segment of the R PCA and the R superior cerebellar artery. Patient received TPA and was subsequently admitted to the MICU for post-TPA monitoring. Patient deficits fully resolved and repeat CT head on 6/2 showed stable changes. Echo with doppler negative for PFO. Patient was started on ceftriaxone for UTI, completed treatment while inpatient.     Workup for thromboembolic cause of cerebral event with CARLA and lower extremity dopplers was negative: no clot visualized in the left atrial appendage or in the lower extremity deep veins. MRI head Patient is a 89 year old female with unknown past medical history who presented from home on 6/1 with facial droop and aphasia. In ED, NIHSS of 5 for facial droop, left sided drift and significant aphasia.  Head CT negative for hemorrhage. CTA showed narrowing of the P1 segment of the R PCA and the R superior cerebellar artery. Patient received TPA and was subsequently admitted to the MICU for post-TPA monitoring. Patient deficits fully resolved and repeat CT head on 6/2 showed stable changes. Echo with doppler negative for PFO. Patient was started on ceftriaxone for UTI, completed treatment while inpatient.     Workup for thromboembolic cause of cerebral event with CARLA and lower extremity dopplers was negative: no clot visualized in the left atrial appendage or in the lower extremity deep veins. MRI head showed Patient is a 89 year old female with unknown past medical history who presented from home on 6/1 with facial droop and aphasia. In ED, NIHSS of 5 for facial droop, left sided drift and significant aphasia.  Head CT negative for hemorrhage. CTA showed narrowing of the P1 segment of the R PCA and the R superior cerebellar artery. Patient received TPA and was subsequently admitted to the MICU for post-TPA monitoring. Patient deficits fully resolved and repeat CT head on 6/2 showed stable changes. Echo with doppler negative for PFO. Patient was started on ceftriaxone for UTI, completed treatment while inpatient.     Workup for thromboembolic cause of cerebral event with CARLA and lower extremity dopplers was negative: no clot visualized in the left atrial appendage or in the lower extremity deep veins. MRI head showed tiny left cerebellar acute to subacute infarction as well as a right occipital   chronic infarction. Moderate microvascular disease.

## 2018-06-04 NOTE — PROGRESS NOTE ADULT - PROBLEM SELECTOR PLAN 1
Presented with aphasia, facial droop, L side pronator drift. NIHSS of 5 on admission. S/p TPA.   - CT head 6/1 negative for hemorrhage.   - CTA Head/Neck: atherosclerosis and previous strokes, no thrombus  - CT head 6/2 stable  - Echo with bubble negative for PFO  - Awaiting CARLA for clot in the BRISSA  - continue with lipitor 40  - continue ASA 81 and plavix 75 (can discontinue plavix after 3 months)  - PT eval: home with home PT

## 2018-06-04 NOTE — PROGRESS NOTE ADULT - PROBLEM SELECTOR PROBLEM 1
Acute CVA (cerebrovascular accident)

## 2018-06-04 NOTE — DISCHARGE NOTE ADULT - CARE PROVIDER_API CALL
Lillie Luna), Neurology; Vascular Neurology  [address] [place] [address]  Phone: [phone]  Fax: [phone]

## 2018-06-04 NOTE — PROGRESS NOTE ADULT - PROBLEM SELECTOR PLAN 2
-continue with ceftriaxone 1g qd (6/2 - 6/4) Currently DAY 2/3. -continue with ceftriaxone 1g qd (6/2 - 6/4) Currently DAY 3/3.

## 2018-06-04 NOTE — DISCHARGE NOTE ADULT - MEDICATION SUMMARY - MEDICATIONS TO TAKE
I will START or STAY ON the medications listed below when I get home from the hospital:    aspirin 81 mg oral delayed release tablet  -- 1 tab(s) by mouth once a day MDD:1  -- Indication: For Cva    Lipitor 40 mg oral tablet  -- 1 tab(s) by mouth once a day (at bedtime) MDD:1  -- Indication: For Cva    clopidogrel 75 mg oral tablet  -- 1 tab(s) by mouth once a day MDD:1  -- Indication: For Cva

## 2018-06-04 NOTE — DISCHARGE NOTE ADULT - PATIENT PORTAL LINK FT
You can access the [facility] Patient Portal, offered by [facility], by registering with the following website: [email or web address]

## 2018-06-04 NOTE — CONSULT NOTE ADULT - ASSESSMENT
Patient is a 89 year old female with unknown past medical history who presented with CVA due to stenosis at the R PCA and superior cerebellar artery. Now s/p TPA with stable CT head and resolution of focal deficits, stable for transfer to [facility].     Problem/Plan - 1:  ·  Problem: Acute CVA (cerebrovascular accident).  Plan: Presented with aphasia, facial droop, L side pronator drift. NIHSS of 5 on admission. S/p TPA.   - CT head 6/1 negative for hemorrhage.   - CTA Head/Neck: atherosclerosis and previous strokes, no thrombus  - CT head 6/2 stable  - Echo with bubble negative for PFO  - Awaiting CARLA for clot in the BRISSA  - continue with lipitor 40  - continue ASA 81 and plavix 75 (can discontinue plavix after 3 months)  - PT eval: home with home PT.     Problem/Plan - 2:  ·  Problem: Acute cystitis without hematuria.  Plan: -continue with ceftriaxone 1g qd (6/2 - 6/4) Currently DAY 2/3.

## 2018-06-04 NOTE — DISCHARGE NOTE ADULT - OTHER SIGNIFICANT FINDINGS
CT Head No Cont (06.02.18 @ 18:18)   IMPRESSION:   No acute intracranial hemorrhage or evidence of recent transcortical   infarction. Stable examination since 6/1/2018.    CT Perfusion w/ Maps w/ IV Cont (06.01.18 @ 15:40)   IMPRESSION: No reduced CBF or mismatch.    Echo W Bubble w/o Doppler Complete (06.02.18 @ 13:37)   Left ventricular hypertrophy presentThe left ventricular ejection   fraction is estimated to be 60% The left atrial size is normal. No hemodynamically  significant valvular aortic stenosis. Mitral annular calcification   noted. There is trace mitral regurgitation. There is trace tricuspid   regurgitation. There was insufficient TR detected from which to calculate pulmonary artery   systolic  pressure.  No pulmonic regurgitation noted. The inferior vena cava is   normal in size (<2.1 cm) with normal inspiratory collapse (>50%) consistent with   normal right atrial pressure.  Heterogenous space anterior to the heart; likely   prominent epicardial fat, cannot rule out small pericardial effusion.    Limited bubble study: No interatrial shunting appreciated. CT Head No Cont (06.02.18 @ 18:18)   IMPRESSION:   No acute intracranial hemorrhage or evidence of recent transcortical   infarction. Stable examination since 6/1/2018.    CT Perfusion w/ Maps w/ IV Cont (06.01.18 @ 15:40)   IMPRESSION: No reduced CBF or mismatch.    Echo W Bubble w/o Doppler Complete (06.02.18 @ 13:37)   Left ventricular hypertrophy presentThe left ventricular ejection   fraction is estimated to be 60% The left atrial size is normal. No hemodynamically  significant valvular aortic stenosis. Mitral annular calcification   noted. There is trace mitral regurgitation. There is trace tricuspid   regurgitation. There was insufficient TR detected from which to calculate pulmonary artery   systolic  pressure.  No pulmonic regurgitation noted. The inferior vena cava is   normal in size (<2.1 cm) with normal inspiratory collapse (>50%) consistent with   normal right atrial pressure.  Heterogenous space anterior to the heart; likely   prominent epicardial fat, cannot rule out small pericardial effusion.    Limited bubble study: No interatrial shunting appreciated.    US Duplex Venous Lower Ext Complete, Bilateral (06.04.18 @ 19:08)   IMPRESSION:  No deep vein thrombosis seen.    CARLA w/Doppler (06.04.18 @ 16:28)   Interpretation Summary  CARLA to evaluate for possible cardiac source of CVA. No clot seen in the   left atrium or in the left atrial appendage. Left atrial appendage emptying  velocities are normal.  Injection of agitated saline contrast documented   no interatrial shunt. No evidence for interatrial shunt by color Doppler  assessment.  There is mild aortic valve thickening. The aortic valve is CT Head No Cont (06.02.18 @ 18:18)   IMPRESSION:   No acute intracranial hemorrhage or evidence of recent transcortical   infarction. Stable examination since 6/1/2018.    CT Perfusion w/ Maps w/ IV Cont (06.01.18 @ 15:40)   IMPRESSION: No reduced CBF or mismatch.    Echo W Bubble w/o Doppler Complete (06.02.18 @ 13:37)   Left ventricular hypertrophy presentThe left ventricular ejection   fraction is estimated to be 60% The left atrial size is normal. No hemodynamically  significant valvular aortic stenosis. Mitral annular calcification   noted. There is trace mitral regurgitation. There is trace tricuspid   regurgitation. There was insufficient TR detected from which to calculate pulmonary artery   systolic  pressure.  No pulmonic regurgitation noted. The inferior vena cava is   normal in size (<2.1 cm) with normal inspiratory collapse (>50%) consistent with   normal right atrial pressure.  Heterogenous space anterior to the heart; likely   prominent epicardial fat, cannot rule out small pericardial effusion.    Limited bubble study: No interatrial shunting appreciated.    US Duplex Venous Lower Ext Complete, Bilateral (06.04.18 @ 19:08)   IMPRESSION:  No deep vein thrombosis seen.    CARLA w/Doppler (06.04.18 @ 16:28)   Interpretation Summary:  CARLA to evaluate for possible cardiac source of CVA. No clot seen in the   left atrium or in the left atrial appendage. Left atrial appendage emptying  velocities are normal.  Injection of agitated saline contrast documented   no interatrial shunt. No evidence for interatrial shunt by color Doppler  assessment.  There is mild aortic valve thickening.    MR Head No Cont (06.04.18 @ 23:05)   Impression: Limited evaluation.    Tiny left cerebellar acute to subacute infarction. Right occipital   chronic infarction. Moderate microvascular disease.

## 2018-06-04 NOTE — CONSULT NOTE ADULT - SUBJECTIVE AND OBJECTIVE BOX
Patient is a 89y old  Female who presents with a chief complaint of CVA (04 Jun 2018 05:09)       HPI:  89 yo female with unknown past medical history who presents from home with facial droop, aphasia after she was found be her doorman who called the ambulance. Per the son patient is semi independent at baseline - she lives alone but has an aide who visits her 3 times per week. She ambulates typically with a walker but has had multiple falls in the past resulting in hip fracture and knee fracture. Son is unaware of her past medical history but states that she follows with a PMD - home meds found by son include celecoxib and ambien 10 mg. Old prescriptions found at home included gabapentin, oxycodone. Patient reports taking her medications but is unclear about which specifically. On arrival to the ED stroke code was called and patient was found to have NIHSS of 5 for facial droop, left sided drift and significant aphasia.  Head CT showed no acute bleed so tPA was administered at 15:28. Patient admitted to MICU per tPA protocol.     In the ED patient received seroquel 25 mg for agitation and labetalol for elevated BP. (01 Jun 2018 19:09)      PAST MEDICAL & SURGICAL HISTORY:  No pertinent past medical history  Closed left femoral fracture      MEDICATIONS  (STANDING):  aspirin enteric coated 81 milliGRAM(s) Oral daily  atorvastatin 40 milliGRAM(s) Oral at bedtime  clopidogrel Tablet 75 milliGRAM(s) Oral daily  heparin  Injectable 5000 Unit(s) SubCutaneous every 8 hours  melatonin 5 milliGRAM(s) Oral at bedtime    MEDICATIONS  (PRN):      Social History: lives alone in an elevator accessible apartment building has 2 sons, has house keeper    Functional Level Prior to Admission: states she is ADL independent, walks with a cane at home and a rollator in the community    FAMILY HISTORY:  No pertinent family history in first degree relatives      CBC Full  -  ( 03 Jun 2018 04:40 )  WBC Count : 6.2 K/uL  Hemoglobin : 13.0 g/dL  Hematocrit : 40.9 %  Platelet Count - Automated : 207 K/uL  Mean Cell Volume : 85.0 fL  Mean Cell Hemoglobin : 27.0 pg  Mean Cell Hemoglobin Concentration : 31.8 g/dL  Auto Neutrophil # : x  Auto Lymphocyte # : x  Auto Monocyte # : x  Auto Eosinophil # : x  Auto Basophil # : x  Auto Neutrophil % : x  Auto Lymphocyte % : x  Auto Monocyte % : x  Auto Eosinophil % : x  Auto Basophil % : x      06-03    138  |  102  |  12  ----------------------------<  112<H>  4.0   |  24  |  0.50    Ca    8.7      03 Jun 2018 04:40  Mg     2.1     06-03              Radiology:    < from: CT Head No Cont (06.02.18 @ 18:18) >  EXAM:  CT BRAIN                          PROCEDURE DATE:  06/02/2018          INTERPRETATION:  PROCEDURE: CT head without intravenous contrast    CLINICAL INDICATION: Dysarthria status post TPA.    TECHNIQUE: Multiple axial images were obtained andviewed at 5 mm   intervals from the skull base to the vertex. The images were reviewed in   brain and bone windows.     COMPARISON: CT head 6/1/2018.    FINDINGS:     There is age-appropriate parenchymal volume loss as manifested by   enlargement of the ventricles, cisternal spaces, and cortical sulci   throughout. There is no acute intracranial hemorrhage. There is no mass   effect, midline shift or extra axial collection. The gray white   differentiation appears preserved without evidence of anacute   transcortical infarction. There is moderate confluent periventricular   white matter attenuation, likely the sequela of small vessel ischemic   disease. There are again chronic lacunar infarctions involving the left   caudate and left lentiform nucleus as well as a chronic infarction in the   region of the right PCA territory.    The bony windows demonstrates no fractures. The visualized paranasal   sinuses and mastoid air cells are clear. The lenses are absent   bilaterally.    IMPRESSION:     No acute intracranial hemorrhage or evidence of recent transcortical   infarction. Stable examination since 6/1/2018.        < from: CT Perfusion w/ Maps w/ IV Cont (06.01.18 @ 15:40) >    EXAM:  CT PERFUSION W MAPS IC                          PROCEDURE DATE:  06/01/2018          INTERPRETATION:  PROCEDURE: CT Perfusion with intravenous contrast.    INDICATION: Expressive aphasia    TECHNIQUE: Following the intravenous administrationof contrast, serial   axial images were obtained through the brain. The CT perfusion data set   was post processed per [facility] protocol generating color maps of   CBF, CBV, MTT, and Tmax.    COMPARISON: None    FINDINGS: The color maps demonstrates focal area of decreased CBV and CBF   within the paramedian right occipital lobe without Tmax elevation which   corresponds to old infarct on the noncontrast CT. There is no calculated   RAPID CBF volume < 30% deficit or elevated Tmax volume >6 seconds. There   is no mismatch deficit.    IMPRESSION: No reduced CBF or mismatch.        < from: CT Brain Stroke Protocol (06.01.18 @ 16:00) >  EXAM:  CT BRAIN STROKE PROTOCOL                          PROCEDURE DATE:  06/01/2018          INTERPRETATION:  PROCEDURE: CT head without intravenous contrast.    INDICATION: Expressive aphasia, facial droop    TECHNIQUE: Multiple axial sections were obtained at 5 mm intervals. The   images were reviewed in brain and bone windows. Imaging is performed   using helical low-dose technique, and sagittal and coronal reformations   are provided.    COMPARISON: 09/12/2009    FINDINGS:    There is diffuse cortical volume loss which has progressed compared to   the 2009 study. No evidence of hydrocephalus. There is also progression   and much more evident small vessel ischemic change with confluent and   patchy periventricular white matter lucency. There are new a chronic   appearing infarctions in the left caudate nucleus and putamen and in the   right occipital lobe where there is focal encephalomalacia and gliosis.    There is no acute intracranial hemorrhage or CT evidence of recent   transcortical infarction. No mass effect or midline shift or extra-axial   collection.    The bone window images show no calvarial fracture. The included paranasal   sinuses and mastoid air cells are predominantly well ventilated. The   native ocular lensesare absent, new from 2009.    IMPRESSION:     No acute intracranial abnormality. Specifically, no acute intracranial   hemorrhage, mass effect or recent transcortical or territorial infarction.    Parenchymal volume loss and small vessel ischemic changes have progressed   compared to 2009. Left basal ganglia and right occipital prior   infarction, though new from 2009.            Vital Signs Last 24 Hrs  T(C): 36.7 (04 Jun 2018 12:43), Max: 37.6 (03 Jun 2018 14:00)  T(F): 98.1 (04 Jun 2018 12:43), Max: 99.6 (03 Jun 2018 14:00)  HR: 78 (04 Jun 2018 12:43) (72 - 86)  BP: 117/66 (04 Jun 2018 12:43) (117/66 - 137/67)  BP(mean): 94 (03 Jun 2018 16:00) (94 - 94)  RR: 16 (04 Jun 2018 12:43) (15 - 18)  SpO2: 95% (04 Jun 2018 12:43) (95% - 97%)    REVIEW OF SYSTEMS:    CONSTITUTIONAL: fatigue  EYES: No eye pain, visual disturbances, or discharge  ENMT:  No difficulty hearing, tinnitus, vertigo; No sinus or throat pain  NECK: No pain or stiffness  BREASTS: No pain, masses, or nipple discharge  RESPIRATORY: No cough, wheezing, chills or hemoptysis; No shortness of breath  CARDIOVASCULAR: No chest pain, palpitations, dizziness, or leg swelling  GASTROINTESTINAL: No abdominal or epigastric pain. No nausea, vomiting, or hematemesis; No diarrhea or constipation. No melena or hematochezia.  GENITOURINARY: No dysuria, frequency, hematuria, or incontinence  NEUROLOGICAL: No headaches, memory loss, loss of strength, numbness, or tremors  SKIN: No itching, burning, rashes, or lesions   LYMPH NODES: No enlarged glands  ENDOCRINE: No heat or cold intolerance; No hair loss  MUSCULOSKELETAL: No joint pain or swelling; No muscle, back, or extremity pain  PSYCHIATRIC: No depression, anxiety, mood swings, or difficulty sleeping  HEME/LYMPH: No easy bruising, or bleeding gums  ALLERGY AND IMMUNOLOGIC: No hives or eczema  VASCULAR: no swelling, erythema    Physical Exam: WDWN elderly  womna lying in semi Oh's position, reading magazine, pleasant, "feels a bit tired"    Head: normocephalic, atraumatic    Eyes: PERRLA, EOMI, no nystagmus, sclera anicteric    ENT: nasal discharge, uvula midline, no oropharyngeal erythema/exudate    Neck: supple, negative JVD, negative carotid bruits, no thyromegaly    Chest: CTA bilaterally, neg wheeze, rhonchi, rales, crackles, egophany    Cardiovascular: regular rate and rhythm, neg murmurs/rubs/gallops    Abdomen: soft, non distended, non tender, negative rebound/guarding, normal bowel sounds, neg hepatosplenomegaly    Extremities: WWP, 1+ non pitting edema LE's, negative calf tenderness to palpation, negative Anni's sign, left knee well healed midline surgical scar    :     Neurologic Exam:    Alert and oriented to person, place, date/year, speech fluent w/o dysarthria, repetition intact, comprehension intact,     Cranial Nerves:     II:                       pupils equal, round and reactive to light, visual fields intact   III/ IV/VI:             extraocular movements intact, neg nystagmus, ptosis  V:                       facial sensation intact, V1-3 normal  VII:                     face symmetric, no droop, normal eye closure and smile  VIII:                    hearing intact to finger rub bilaterally  IX/ X:                 soft palate rise symmetrical  XI:                      head turning, shoulder shrug normal  XII:                     tongue midline    Motor Exam:    Upper Extremities:        Right:    4+/5 /intrinsics                                                 4+/5 deltoid                                                 5/5 biceps                                                 5/5 triceps                                                 5/5 wrist extensors-flexors                                                 negative pronator drift                                        Left:      4+/5 /intrinsics                                                 4+/5 deltoid                                                 5/5 biceps                                                 5/5 triceps                                                 5/5 wrist extensors-flexors                                                 negative pronator drift       Lower Extremities:         Right      4+/5 hip flexors/adductors/abductors                                                   5/5 quadriceps/hamstrings                                                   5/5 dorsiflexors/plantar flexors/invertors-evertors                                       Left:       4+/5 hip flexors/adductors/abductors                                                  5/5 quadriceps/hamstrings                                                  5/5 dorsiflexors/plantar flexors/invertors-evertors    Sensory:              intact to LT/PP in all UE/LE dermatomes    DTR:                   = biceps/     triceps/     brachioradialis                            = patella/   medial hamstring/    ankle                            neg clonus                            neg Babinski                            neg Hoffmans    Finger to Nose:  wnl    Heel to Shin:       wnl    Rapid Alternating movements:   wnl    Joint Position Sense:  intact    Romberg:  not tested    Tandem Walking:  not tested    Gait:  not tested        PM&R Impression:    1) 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    2) Anticipated Disposition Plan/Recommendations:  d/c home with home/outpatient physical therapy

## 2018-06-04 NOTE — DISCHARGE NOTE ADULT - CARE PLAN
Principal Discharge DX:	Acute CVA (cerebrovascular accident)  Goal:	To minimize the risk of future re-occurrence  Assessment and plan of treatment:	You presented to the hospital with difficulty speaking and abnormalities in your facial muscles. This was due to a stroke. After a stroke, medications and lifestyle changes can help reduce the risk of future re-occurrence. Please continue to take your aspirin daily, as well as your plavix and lipitor daily. These medications will help to reduce the risk of developing another stroke. You can discontinue the plavix after three months. Please follow up with Dr. Lillie Luna, who will continue to monitor your neurologic health. If you have a repeat episode of acute onset weakness, sudden onset severe headache, visual changes, sensory changes in one side of your body, dizziness or difficulty speaking, please report to the emergency room for immediate attention.  Secondary Diagnosis:	Acute cystitis without hematuria  Assessment and plan of treatment:	While you were in the hospital you were treated for an infection in your urine. Sometimes, these infections can cause changes in your mental acuity. You can become forgetful, or start to behave unlike your usual self. This can be dangerous because you can hurt yourself by falling or by making unsafe decisions while in this state. If you begin to feel burning sensation with urinating, that you are urinating more frequently or that urination is urgent, you may require treatment for another urinary infection. You completed treatment before you left the hospital and will not need to take additional medications upon discharge.  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 This can cause you to be sedentary. While sedentary, your body can create blood clots that travel throughout your vascular system and can get caught in the lungs, limbs or brain. Please try to walk (if able, safely, with help- if necessary), stretch, or wear compression stockings to reduce the risk of clot formation.  Secondary Diagnosis:	Toxic metabolic encephalopathy  Assessment and plan of treatment:	While you were in the hospital you were briefly unaware of your surroundings and basic information that you know when you are fully yourself. This may have been due to your stroke, or your urine infection. This state resolved with stroke and treatment for your urine infection. Please continue to watch for signs of acute mental status changes (and tell loved ones to be aware as well) and report to the hospital for immediate attention if you start to notice changes. Principal Discharge DX:	Acute CVA (cerebrovascular accident)  Goal:	To minimize the risk of future re-occurrence  Assessment and plan of treatment:	You presented to the hospital with difficulty speaking and abnormalities in your facial muscles. This was due to a stroke. After a stroke, medications and lifestyle changes can help reduce the risk of future re-occurrence. Please continue to take your aspirin daily, as well as your plavix and lipitor daily. These medications will help to reduce the risk of developing another stroke. You can discontinue the plavix after three months. Please follow up with Dr. Lillie Luna, who will continue to monitor your neurologic health. If you have a repeat episode of acute onset weakness, sudden onset severe headache, visual changes, sensory changes in one side of your body, dizziness or difficulty speaking, please report to the emergency room for immediate attention. You will need to follow up to get an MRI of your head after you are discharged from the hospital.  Secondary Diagnosis:	Acute cystitis without hematuria  Assessment and plan of treatment:	While you were in the hospital you were treated for an infection in your urine. Sometimes, these infections can cause changes in your mental acuity. You can become forgetful, or start to behave unlike your usual self. This can be dangerous because you can hurt yourself by falling or by making unsafe decisions while in this state. If you begin to feel burning sensation with urinating, that you are urinating more frequently or that urination is urgent, you may require treatment for another urinary infection. You completed treatment before you left the hospital and will not need to take additional medications upon discharge.  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 This can cause you to be sedentary. While sedentary, your body can create blood clots that travel throughout your vascular system and can get caught in the lungs, limbs or brain. Please try to walk (if able, safely, with help- if necessary), stretch, or wear compression stockings to reduce the risk of clot formation.  Secondary Diagnosis:	Toxic metabolic encephalopathy  Assessment and plan of treatment:	While you were in the hospital you were briefly unaware of your surroundings and basic information that you know when you are fully yourself. This may have been due to your stroke, or your urine infection. This state resolved with stroke and treatment for your urine infection. Please continue to watch for signs of acute mental status changes (and tell loved ones to be aware as well) and report to the hospital for immediate attention if you start to notice changes. Principal Discharge DX:	Acute CVA (cerebrovascular accident)  Goal:	To minimize the risk of future re-occurrence  Assessment and plan of treatment:	You presented to the hospital with difficulty speaking and abnormalities in your facial muscles. This was due to a stroke. After a stroke, medications and lifestyle changes can help reduce the risk of future re-occurrence. Please continue to take your aspirin daily, as well as your plavix and lipitor daily. These medications will help to reduce the risk of developing another stroke. You can discontinue the plavix after three months. Please follow up with Dr. Lillie Luna, who will continue to monitor your neurologic health. If you have a repeat episode of acute onset weakness, sudden onset severe headache, visual changes, sensory changes in one side of your body, dizziness or difficulty speaking, please report to the emergency room for immediate attention.    MRI results: MRI head showed tiny left cerebellar acute to subacute infarction as well as a right occipital   chronic infarction. Moderate microvascular disease.  Secondary Diagnosis:	Acute cystitis without hematuria  Assessment and plan of treatment:	While you were in the hospital you were treated for an infection in your urine. Sometimes, these infections can cause changes in your mental acuity. You can become forgetful, or start to behave unlike your usual self. This can be dangerous because you can hurt yourself by falling or by making unsafe decisions while in this state. If you begin to feel burning sensation with urinating, that you are urinating more frequently or that urination is urgent, you may require treatment for another urinary infection. You completed treatment before you left the hospital and will not need to take additional medications upon discharge.  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 This can cause you to be sedentary. While sedentary, your body can create blood clots that travel throughout your vascular system and can get caught in the lungs, limbs or brain. Please try to walk (if able, safely, with help- if necessary), stretch, or wear compression stockings to reduce the risk of clot formation.  Secondary Diagnosis:	Toxic metabolic encephalopathy  Assessment and plan of treatment:	While you were in the hospital you were briefly unaware of your surroundings and basic information that you know when you are fully yourself. This may have been due to your stroke, or your urine infection. This state resolved with stroke and treatment for your urine infection. Please continue to watch for signs of acute mental status changes (and tell loved ones to be aware as well) and report to the hospital for immediate attention if you start to notice changes.

## 2018-06-04 NOTE — PROGRESS NOTE ADULT - SUBJECTIVE AND OBJECTIVE BOX
OVERNIGHT EVENTS: No overnight events.     SUBJECTIVE / INTERVAL HPI: Patient seen and examined at bedside. No complaints this morning.     VITAL SIGNS:  Vital Signs Last 24 Hrs  T(C): 36.4 (04 Jun 2018 12:43), Max: 37.2 (03 Jun 2018 20:48)  T(F): 97.6 (04 Jun 2018 12:43), Max: 98.9 (03 Jun 2018 20:48)  HR: 78 (04 Jun 2018 12:43) (72 - 86)  BP: 138/69 (04 Jun 2018 12:43) (124/71 - 138/69)  BP(mean): 94 (03 Jun 2018 16:00) (94 - 94)  RR: 16 (04 Jun 2018 12:43) (15 - 18)  SpO2: 96% (04 Jun 2018 12:43) (95% - 97%)    PHYSICAL EXAM:  General: WDWN  HEENT: NC/AT; PERRL, anicteric sclera; MMM  Neck: supple  Cardiovascular: +S1/S2; RRR  Respiratory: CTA B/L; no W/R/R  Gastrointestinal: soft, NT/ND; +BSx4  Extremities: WWP; no edema, clubbing or cyanosis  Vascular: 2+ radial, DP/PT pulses B/L  Neurological: AAOx3; no focal deficits    MEDICATIONS:  MEDICATIONS  (STANDING):  aspirin enteric coated 81 milliGRAM(s) Oral daily  atorvastatin 40 milliGRAM(s) Oral at bedtime  clopidogrel Tablet 75 milliGRAM(s) Oral daily  heparin  Injectable 5000 Unit(s) SubCutaneous every 8 hours  melatonin 5 milliGRAM(s) Oral at bedtime    MEDICATIONS  (PRN):      ALLERGIES:  Allergies    No Known Allergies    Intolerances        LABS:                        13.0   6.2   )-----------( 207      ( 03 Jun 2018 04:40 )             40.9     06-03    138  |  102  |  12  ----------------------------<  112<H>  4.0   |  24  |  0.50    Ca    8.7      03 Jun 2018 04:40  Mg     2.1     06-03          CAPILLARY BLOOD GLUCOSE    POCT Blood Glucose.: 130 mg/dL (04 Jun 2018 08:13)    RADIOLOGY & ADDITIONAL TESTS: Reviewed.

## 2018-06-05 VITALS
RESPIRATION RATE: 15 BRPM | SYSTOLIC BLOOD PRESSURE: 143 MMHG | HEART RATE: 81 BPM | DIASTOLIC BLOOD PRESSURE: 72 MMHG | TEMPERATURE: 98 F | OXYGEN SATURATION: 95 %

## 2018-06-05 PROCEDURE — 85027 COMPLETE CBC AUTOMATED: CPT

## 2018-06-05 PROCEDURE — 97161 PT EVAL LOW COMPLEX 20 MIN: CPT

## 2018-06-05 PROCEDURE — 85610 PROTHROMBIN TIME: CPT

## 2018-06-05 PROCEDURE — 93312 ECHO TRANSESOPHAGEAL: CPT

## 2018-06-05 PROCEDURE — 70496 CT ANGIOGRAPHY HEAD: CPT

## 2018-06-05 PROCEDURE — 87086 URINE CULTURE/COLONY COUNT: CPT

## 2018-06-05 PROCEDURE — 83735 ASSAY OF MAGNESIUM: CPT

## 2018-06-05 PROCEDURE — 93307 TTE W/O DOPPLER COMPLETE: CPT

## 2018-06-05 PROCEDURE — 36415 COLL VENOUS BLD VENIPUNCTURE: CPT

## 2018-06-05 PROCEDURE — 70450 CT HEAD/BRAIN W/O DYE: CPT

## 2018-06-05 PROCEDURE — 93970 EXTREMITY STUDY: CPT

## 2018-06-05 PROCEDURE — 81001 URINALYSIS AUTO W/SCOPE: CPT

## 2018-06-05 PROCEDURE — 80048 BASIC METABOLIC PNL TOTAL CA: CPT

## 2018-06-05 PROCEDURE — 70498 CT ANGIOGRAPHY NECK: CPT

## 2018-06-05 PROCEDURE — 80061 LIPID PANEL: CPT

## 2018-06-05 PROCEDURE — 99238 HOSP IP/OBS DSCHRG MGMT 30/<: CPT

## 2018-06-05 PROCEDURE — 0042T: CPT

## 2018-06-05 PROCEDURE — 87186 SC STD MICRODIL/AGAR DIL: CPT

## 2018-06-05 PROCEDURE — 85025 COMPLETE CBC W/AUTO DIFF WBC: CPT

## 2018-06-05 PROCEDURE — 82962 GLUCOSE BLOOD TEST: CPT

## 2018-06-05 PROCEDURE — 70551 MRI BRAIN STEM W/O DYE: CPT

## 2018-06-05 PROCEDURE — 99291 CRITICAL CARE FIRST HOUR: CPT | Mod: 25

## 2018-06-05 PROCEDURE — 83036 HEMOGLOBIN GLYCOSYLATED A1C: CPT

## 2018-06-05 PROCEDURE — 85730 THROMBOPLASTIN TIME PARTIAL: CPT

## 2018-06-05 PROCEDURE — 80053 COMPREHEN METABOLIC PANEL: CPT

## 2018-06-05 PROCEDURE — 84484 ASSAY OF TROPONIN QUANT: CPT

## 2018-06-05 RX ORDER — ASPIRIN/CALCIUM CARB/MAGNESIUM 324 MG
1 TABLET ORAL
Qty: 30 | Refills: 0 | OUTPATIENT
Start: 2018-06-05 | End: 2018-07-04

## 2018-06-05 RX ORDER — CLOPIDOGREL BISULFATE 75 MG/1
1 TABLET, FILM COATED ORAL
Qty: 90 | Refills: 0 | OUTPATIENT
Start: 2018-06-05 | End: 2018-09-02

## 2018-06-05 RX ORDER — ATORVASTATIN CALCIUM 80 MG/1
1 TABLET, FILM COATED ORAL
Qty: 30 | Refills: 0 | OUTPATIENT
Start: 2018-06-05 | End: 2018-07-04

## 2018-06-05 RX ADMIN — Medication 81 MILLIGRAM(S): at 11:21

## 2018-06-05 RX ADMIN — HEPARIN SODIUM 5000 UNIT(S): 5000 INJECTION INTRAVENOUS; SUBCUTANEOUS at 05:48

## 2018-06-05 RX ADMIN — CLOPIDOGREL BISULFATE 75 MILLIGRAM(S): 75 TABLET, FILM COATED ORAL at 11:21

## 2018-06-05 NOTE — PROGRESS NOTE ADULT - ASSESSMENT
Patient is a 89 year old female with unknown past medical history who presented with CVA due to stenosis at the R PCA and superior cerebellar artery. Now s/p TPA with stable CT head and resolution of focal deficits, stable for transfer to [facility].     Problem/Plan - 1:  ·  Problem: Acute CVA (cerebrovascular accident).  Plan: Presented with aphasia, facial droop, L side pronator drift. NIHSS of 5 on admission. S/p TPA.   - CT head 6/1 negative for hemorrhage.   - CTA Head/Neck: atherosclerosis and previous strokes, no thrombus  - CT head 6/2 stable  - Echo with bubble negative for PFO  - Awaiting CARLA for clot in the BRISSA  - continue with lipitor 40  - continue ASA 81 and plavix 75 (can discontinue plavix after 3 months)  - PT eval: home with home PT.     Problem/Plan - 2:  ·  Problem: Acute cystitis without hematuria.  Plan: -continue with ceftriaxone 1g qd (6/2 - 6/4)
89 yo female with unknown past medical history who presented with aphasia, confusion, left sided droop and NIHSS of 5 - now s/p tPA at 15:28 6/1.
Patient is a 89 year old female with unknown past medical history who presented with CVA due to stenosis at the R PCA and superior cerebellar artery. Now s/p TPA with stable CT head and resolution of focal deficits, stable for transfer to [facility].
89 yo female with unknown past medical history who presented with aphasia, confusion, left sided droop and NIHSS of 5 - now s/p tPA at 15:28.
Patient is a 89 year old female with unknown past medical history who presented with CVA due to stenosis at the R PCA and superior cerebellar artery. Now s/p TPA with stable CT head and resolution of focal deficits, stable for transfer to [facility].

## 2018-06-05 NOTE — PROGRESS NOTE ADULT - SUBJECTIVE AND OBJECTIVE BOX
Physical Medicine and Rehabilitation Progress Note:    Patient is a 89y old  Female who presents with a chief complaint of CVA (04 Jun 2018 05:09)      HPI:  89 yo female with unknown past medical history who presents from home with facial droop, aphasia after she was found be her doorman who called the ambulance. Per the son patient is semi independent at baseline - she lives alone but has an aide who visits her 3 times per week. She ambulates typically with a walker but has had multiple falls in the past resulting in hip fracture and knee fracture. Son is unaware of her past medical history but states that she follows with a PMD - home meds found by son include celecoxib and ambien 10 mg. Old prescriptions found at home included gabapentin, oxycodone. Patient reports taking her medications but is unclear about which specifically. On arrival to the ED stroke code was called and patient was found to have NIHSS of 5 for facial droop, left sided drift and significant aphasia.  Head CT showed no acute bleed so tPA was administered at 15:28. Patient admitted to MICU per tPA protocol.     In the ED patient received seroquel 25 mg for agitation and labetalol for elevated BP. (01 Jun 2018 19:09)                Vital Signs Last 24 Hrs  T(C): 36.9 (05 Jun 2018 05:12), Max: 36.9 (05 Jun 2018 05:12)  T(F): 98.4 (05 Jun 2018 05:12), Max: 98.4 (05 Jun 2018 05:12)  HR: 81 (05 Jun 2018 05:12) (78 - 88)  BP: 143/72 (05 Jun 2018 05:12) (138/69 - 146/79)  BP(mean): --  RR: 15 (05 Jun 2018 05:12) (15 - 16)  SpO2: 95% (05 Jun 2018 05:12) (95% - 96%)    MEDICATIONS  (STANDING):  aspirin enteric coated 81 milliGRAM(s) Oral daily  atorvastatin 40 milliGRAM(s) Oral at bedtime  clopidogrel Tablet 75 milliGRAM(s) Oral daily  heparin  Injectable 5000 Unit(s) SubCutaneous every 8 hours  melatonin 5 milliGRAM(s) Oral at bedtime    MEDICATIONS  (PRN):    Currently Undergoing Physical Therapy at bedside.    Initial Functional Status Assessment:    Previous Level of Function:     · Ambulation Skills  independent; needs device; straight cane indoors, rollator outdoors    · Transfer Skills  independent    · ADL Skills  independent    · Work/Leisure Activity  independent    · Additional Comments  can ambulate for a few blocks without rest breaks      Cognitive Status Examination:   · Orientation  oriented to person, place, time and situation    · Level of Consciousness  alert    · Follows Commands and Answers Questions  100% of the time    · Personal Safety and Judgment  intact    · Short Term Memory  intact    · Long Term Memory  intact      MMT Shoulder:     · Shoulder Flexion  4+ = good plus  (5) normal, right    · Shoulder Extension  4+ = good plus  (5) normal, right    · Shoulder ABduction  4+ = good plus  (5) normal, right      MMT Elbow:     · Elbow Flexion  (5) normal, left  (5) normal, right    · Elbow Extension  (5) normal, left  (5) normal, right      MMT Hip:     · Hip Flexion  (5) normal, left  (5) normal, right      MMT Knee:     · Knee Flexion  (5) normal, left  (5) normal, right    · Knee Extension  (5) normal, left  (5) normal, right      MMT Ankle:     · Ankle Dorsiflexion  (5) normal, left  (5) normal, right      Bed Mobility: Rolling/Turning:     · Level of [place]  supervision      Bed Mobility: Scooting/Bridging:     · Level of [place]  supervision      Bed Mobility: Supine to Sit:     · Level of [place]  supervision      Transfer: Sit to Stand:     · Level of [place]  contact guard    · Assistive Device  BUE support on EKG monitor      Transfer: Stand to Sit:     · Level of [place]  minimum assist (75% patients effort)    · Physical Assist/Nonphysical Assist  1 person assist    · Assistive Device  BUE support on EKG monitor      Gait Skills:     · Level of [place]  contact guard    · Physical Assist/Nonphysical Assist  nonverbal cues (demo/gestures); 1 person assist    · Assistive Device  BUE support on EKG monitor    · Gait Distance  75 feet; x 2      Gait Analysis:     · Gait Deviations Noted  decreased elisabeth; decreased step length; R trendelenberg, SOB, sp02 > 95% throughout on room air          · Impairments Contributing to Gait Deviations  impaired balance; decreased strength      Balance Skills Assessment:     · Sitting Balance: Static  good balance    · Sitting Balance: Dynamic  good balance    · Sit-to-Stand Balance  fair minus    · Standing Balance: Static  fair balance    · Standing Balance: Dynamic  fair minus    · Systems Impairment Contributing to Balance Disturbance  musculoskeletal    · Identified Impairments Contributing to Balance Disturbance  decreased strength      Sensory Examination:   Sensory Examination:    Grossly Intact:   · Gross Sensory Examination  Grossly Intact; Left UE; Right UE; Left LE; Right LE        Light Touch Sensation:   · Left UE  within normal limits    · Right UE  within normal limits    · Left LE  within normal limits    · Right LE  within normal limits      · Coordination Assessed  finger to nose; heel to shin; BLE intact heel to shin 3/3, BLE alternating toe tap intact 10/10        Proprioception:   · Coordination Assessed  finger to nose; heel to shin; BLE intact heel to shin 3/3, BLE alternating toe tap intact 10/10        Fine Motor Coordination:   Fine Motor Coordination Examination:    Grossly Intact:   · Grossly Intact  Left UE; Right UE; Left LE; Right LE        Fine Motor Coordination:   · Left Hand, Finger to Nose  normal performance    · Right Hand, Finger to Nose  normal performance    · Left Hand, Diadochokinesis Skills  normal performance    · Right Hand, Diadochokinesis Skills  normal performance      Treatment Location:   · Comments  Facial nerve: smile/cheek puff/ eyebrow elevation symmetrical; Tongue: protrudes to R; Visual tracking: all fields intact      Clinical Impressions:   · Criteria for Skilled Therapeutic Interventions  impairments found; functional limitations in following categories; risk reduction/prevention; rehab potential; therapy frequency; anticipated discharge recommendation    · Impairments Found (describe specific impairments)  aerobic capacity/endurance; gait, locomotion, and balance; muscle strength    · Functional Limitations in Following Categories (describe specific limitations)  self-care; home management; community/leisure    · Risk Reduction/Prevention (Describe Specific Areas of risk reduction/prevention)  risk factors    · Risk Areas  fall    · Rehab Potential  good, to achieve stated therapy goals    · Therapy Frequency  3-5x/week              PM&R Impression: as above    Disposition Plan Recommendations: d/c home, outpatient physical therapy

## 2018-06-08 DIAGNOSIS — G92 TOXIC ENCEPHALOPATHY: ICD-10-CM

## 2018-06-08 DIAGNOSIS — R47.01 APHASIA: ICD-10-CM

## 2018-06-08 DIAGNOSIS — R33.9 RETENTION OF URINE, UNSPECIFIED: ICD-10-CM

## 2018-06-08 DIAGNOSIS — I10 ESSENTIAL (PRIMARY) HYPERTENSION: ICD-10-CM

## 2018-06-08 DIAGNOSIS — N39.0 URINARY TRACT INFECTION, SITE NOT SPECIFIED: ICD-10-CM

## 2018-06-08 DIAGNOSIS — R29.810 FACIAL WEAKNESS: ICD-10-CM

## 2018-06-08 DIAGNOSIS — I63.9 CEREBRAL INFARCTION, UNSPECIFIED: ICD-10-CM

## 2018-06-08 DIAGNOSIS — N30.90 CYSTITIS, UNSPECIFIED WITHOUT HEMATURIA: ICD-10-CM

## 2018-06-08 DIAGNOSIS — I63.50 CEREBRAL INFARCTION DUE TO UNSPECIFIED OCCLUSION OR STENOSIS OF UNSPECIFIED CEREBRAL ARTERY: ICD-10-CM

## 2018-06-19 ENCOUNTER — APPOINTMENT (OUTPATIENT)
Dept: NEUROLOGY | Facility: CLINIC | Age: 83
End: 2018-06-19

## 2018-09-19 ENCOUNTER — RX RENEWAL (OUTPATIENT)
Age: 83
End: 2018-09-19

## 2018-12-21 ENCOUNTER — EMERGENCY (EMERGENCY)
Facility: HOSPITAL | Age: 83
LOS: 1 days | Discharge: HOME | End: 2018-12-21
Attending: EMERGENCY MEDICINE | Admitting: EMERGENCY MEDICINE
Payer: MEDICARE

## 2018-12-21 VITALS
RESPIRATION RATE: 18 BRPM | SYSTOLIC BLOOD PRESSURE: 168 MMHG | OXYGEN SATURATION: 98 % | TEMPERATURE: 98 F | HEART RATE: 70 BPM | DIASTOLIC BLOOD PRESSURE: 74 MMHG

## 2018-12-21 VITALS
DIASTOLIC BLOOD PRESSURE: 74 MMHG | TEMPERATURE: 98 F | WEIGHT: 149.91 LBS | SYSTOLIC BLOOD PRESSURE: 157 MMHG | HEART RATE: 76 BPM | RESPIRATION RATE: 18 BRPM | HEIGHT: 65 IN | OXYGEN SATURATION: 97 %

## 2018-12-21 DIAGNOSIS — S72.92XA UNSPECIFIED FRACTURE OF LEFT FEMUR, INITIAL ENCOUNTER FOR CLOSED FRACTURE: Chronic | ICD-10-CM

## 2018-12-21 LAB
ALBUMIN SERPL ELPH-MCNC: 3.5 G/DL — SIGNIFICANT CHANGE UP (ref 3.3–5)
ALP SERPL-CCNC: 87 U/L — SIGNIFICANT CHANGE UP (ref 40–120)
ALT FLD-CCNC: 13 U/L — SIGNIFICANT CHANGE UP (ref 10–45)
ANION GAP SERPL CALC-SCNC: 11 MMOL/L — SIGNIFICANT CHANGE UP (ref 5–17)
APTT BLD: 24.4 SEC — LOW (ref 27.5–36.3)
AST SERPL-CCNC: 17 U/L — SIGNIFICANT CHANGE UP (ref 10–40)
BASOPHILS NFR BLD AUTO: 0.4 % — SIGNIFICANT CHANGE UP (ref 0–2)
BILIRUB SERPL-MCNC: 0.4 MG/DL — SIGNIFICANT CHANGE UP (ref 0.2–1.2)
BUN SERPL-MCNC: 14 MG/DL — SIGNIFICANT CHANGE UP (ref 7–23)
CALCIUM SERPL-MCNC: 8.8 MG/DL — SIGNIFICANT CHANGE UP (ref 8.4–10.5)
CHLORIDE SERPL-SCNC: 107 MMOL/L — SIGNIFICANT CHANGE UP (ref 96–108)
CO2 SERPL-SCNC: 19 MMOL/L — LOW (ref 22–31)
CREAT SERPL-MCNC: 0.5 MG/DL — SIGNIFICANT CHANGE UP (ref 0.5–1.3)
EOSINOPHIL NFR BLD AUTO: 2.3 % — SIGNIFICANT CHANGE UP (ref 0–6)
GLUCOSE SERPL-MCNC: 111 MG/DL — HIGH (ref 70–99)
HCT VFR BLD CALC: 38.1 % — SIGNIFICANT CHANGE UP (ref 34.5–45)
HGB BLD-MCNC: 12.1 G/DL — SIGNIFICANT CHANGE UP (ref 11.5–15.5)
INR BLD: 1.01 — SIGNIFICANT CHANGE UP (ref 0.88–1.16)
LYMPHOCYTES # BLD AUTO: 14.8 % — SIGNIFICANT CHANGE UP (ref 13–44)
MCHC RBC-ENTMCNC: 27.6 PG — SIGNIFICANT CHANGE UP (ref 27–34)
MCHC RBC-ENTMCNC: 31.8 G/DL — LOW (ref 32–36)
MCV RBC AUTO: 87 FL — SIGNIFICANT CHANGE UP (ref 80–100)
MONOCYTES NFR BLD AUTO: 9.4 % — SIGNIFICANT CHANGE UP (ref 2–14)
NEUTROPHILS NFR BLD AUTO: 73.1 % — SIGNIFICANT CHANGE UP (ref 43–77)
PLATELET # BLD AUTO: 245 K/UL — SIGNIFICANT CHANGE UP (ref 150–400)
POTASSIUM SERPL-MCNC: 4.2 MMOL/L — SIGNIFICANT CHANGE UP (ref 3.5–5.3)
POTASSIUM SERPL-SCNC: 4.2 MMOL/L — SIGNIFICANT CHANGE UP (ref 3.5–5.3)
PROT SERPL-MCNC: 6.1 G/DL — SIGNIFICANT CHANGE UP (ref 6–8.3)
PROTHROM AB SERPL-ACNC: 11.4 SEC — SIGNIFICANT CHANGE UP (ref 10–12.9)
RBC # BLD: 4.38 M/UL — SIGNIFICANT CHANGE UP (ref 3.8–5.2)
RBC # FLD: 14.1 % — SIGNIFICANT CHANGE UP (ref 10.3–16.9)
SODIUM SERPL-SCNC: 137 MMOL/L — SIGNIFICANT CHANGE UP (ref 135–145)
WBC # BLD: 6.8 K/UL — SIGNIFICANT CHANGE UP (ref 3.8–10.5)
WBC # FLD AUTO: 6.8 K/UL — SIGNIFICANT CHANGE UP (ref 3.8–10.5)

## 2018-12-21 PROCEDURE — 80053 COMPREHEN METABOLIC PANEL: CPT

## 2018-12-21 PROCEDURE — 99284 EMERGENCY DEPT VISIT MOD MDM: CPT

## 2018-12-21 PROCEDURE — 85730 THROMBOPLASTIN TIME PARTIAL: CPT

## 2018-12-21 PROCEDURE — 93971 EXTREMITY STUDY: CPT | Mod: 26,RT

## 2018-12-21 PROCEDURE — 85610 PROTHROMBIN TIME: CPT

## 2018-12-21 PROCEDURE — 96374 THER/PROPH/DIAG INJ IV PUSH: CPT

## 2018-12-21 PROCEDURE — 99284 EMERGENCY DEPT VISIT MOD MDM: CPT | Mod: 25

## 2018-12-21 PROCEDURE — 85025 COMPLETE CBC W/AUTO DIFF WBC: CPT

## 2018-12-21 PROCEDURE — 93971 EXTREMITY STUDY: CPT

## 2018-12-21 RX ORDER — VANCOMYCIN HCL 1 G
1000 VIAL (EA) INTRAVENOUS ONCE
Qty: 0 | Refills: 0 | Status: COMPLETED | OUTPATIENT
Start: 2018-12-21 | End: 2018-12-21

## 2018-12-21 RX ORDER — AZTREONAM 2 G
1 VIAL (EA) INJECTION
Qty: 14 | Refills: 0 | OUTPATIENT
Start: 2018-12-21 | End: 2018-12-27

## 2018-12-21 RX ORDER — CEPHALEXIN 500 MG
1 CAPSULE ORAL
Qty: 21 | Refills: 0 | OUTPATIENT
Start: 2018-12-21 | End: 2018-12-27

## 2018-12-21 RX ADMIN — Medication 250 MILLIGRAM(S): at 14:18

## 2018-12-21 NOTE — ED PROVIDER NOTE - PROGRESS NOTE DETAILS
W/u neg for acute pathology. Line demarcating area of rash marked. Pt instructed to elevate leg, take prescribed abx. Pt instructed to RTC for redness spreading beyond line drawn. Pt in c/o son whom will take her home. Pt instructed to f/u PCP. Pt wants new PCP - MALIHA Roca to give pt PCP / Derm. Return precautions given.

## 2018-12-21 NOTE — ED ADULT NURSE NOTE - NSIMPLEMENTINTERV_GEN_ALL_ED
Implemented All Fall with Harm Risk Interventions:  [place] to call system. Call bell, personal items and telephone within reach. Instruct patient to call for assistance. Room bathroom lighting operational. Non-slip footwear when patient is off stretcher. Physically safe environment: no spills, clutter or unnecessary equipment. Stretcher in lowest position, wheels locked, appropriate side rails in place. Provide visual cue, wrist band, yellow gown, etc. Monitor gait and stability. Monitor for mental status changes and reorient to person, place, and time. Review medications for side effects contributing to fall risk. Reinforce activity limits and safety measures with patient and family. Provide visual clues: red socks.

## 2018-12-21 NOTE — ED ADULT NURSE NOTE - NS ED NURSE DC INFO COMPLEXITY
Patient asked questions/Complex: Multiple Rx/Tx. Pt has difficulty understanding. Requires additional help/Returned Demonstration/Verbalized Understanding

## 2018-12-21 NOTE — ED ADULT NURSE NOTE - OBJECTIVE STATEMENT
89 y/o F c/o cellulitis to R and L leg. Pt states she has been tx for several months. Denies fever chills n/v/d. Pt states her PCP recommended that she wash area with betadine, but pt states it is getting worse +edematous and is extremely painful. Pt has hx of TIA, no longer on blood thinners, takes lipitor and ambien. Denies any other medical problems. Pt A&OX3, in NAD. #22g placed R hand, labs sent.

## 2018-12-21 NOTE — ED PROVIDER NOTE - NSFOLLOWUPINSTRUCTIONS_ED_ALL_ED_FT
You are being treated for cellulitis. Your blood work and ultrasound of the leg did not reveal any acute abnormalities. You received a dose of IV antibiotics (Vancomycin) in the ED, and a prescription for oral antibiotics has been sent to your pharmacy. A line demarcating the area of redness was drawn on your leg. IF the area of redness spreads beyond this point, please return to the ED. Or if you develop fever, or other concerning symptoms, return to the ED. Keep the leg elevated when home. Follow up with your primary doctor. Given recurrent skin symptoms, you should also see a dermatologist.     Cellulitis    Cellulitis is a skin infection caused by bacteria. This condition occurs most often in the arms and lower legs but can occur anywhere over the body. Symptoms include redness, swelling, warm skin, tenderness, and chills/fever. If you were prescribed an antibiotic medicine, take it as told by your health care provider. Do not stop taking the antibiotic even if you start to feel better.    SEEK IMMEDIATE MEDICAL CARE IF YOU HAVE ANY OF THE FOLLOWING SYMPTOMS: worsening fever, red streaks coming from affected area, vomiting or diarrhea, or dizziness/lightheadedness.

## 2018-12-21 NOTE — ED ADULT NURSE NOTE - CAS EDN DISCHARGE ASSESSMENT
Dressing clean and dry/No adverse reaction to first time med in ED/Awake/Patient baseline mental status/Symptoms improved/Alert and oriented to person, place and time

## 2018-12-21 NOTE — ED PROVIDER NOTE - PHYSICAL EXAMINATION
Constitutional: Well appearing, well nourished, awake, alert, oriented to person, place, time/situation and in no apparent distress.  ENMT: Airway patent. Normal MM  Eyes: Clear bilaterally  Cardiac: Normal rate, regular rhythm.  Heart sounds S1, S2.  No murmurs, rubs or gallops.  Respiratory: Breaths sounds equal and clear b/l. No increased WOB, tachypnea, hypoxia, or accessory mm use. Pt speaks in full sentences.   Gastrointestinal: Abd soft, NT, ND, NABS. No guarding, rebound, or rigidity. No pulsatile abdominal masses. No organomegaly appreciated. No CVAT   Musculoskeletal: Range of motion is not limited. no calf ttp.   Neuro: Alert and oriented x 3, face symmetric and speech fluent. Strength 5/5 x 4 ext and symmetric, nml gross motor movement, nml gait. No focal deficits noted.  Skin: Skin normal color for race, warm, dry and intact. blanching erythema, edema, and warmth to RLE anteromedial leg. Some areas of overlying, thickened skin over the ankle. no open wounds. no crepitus. no vesicles. foot spared. 1+ pedal pulses, normal motor / sensory. cap refill < 2 sec. no skin changes or erythema to LLE  Psych: Alert and oriented to person, place, time/situation. normal mood and affect. no apparent risk to self or others.

## 2018-12-21 NOTE — ED PROVIDER NOTE - MEDICAL DECISION MAKING DETAILS
Pt p/w RLE rash and pain. Cellulitis, less likely vasculitis. Check labs, LE doppler. Start abx. Dispo pending w/u and clinical status

## 2018-12-25 DIAGNOSIS — Z79.82 LONG TERM (CURRENT) USE OF ASPIRIN: ICD-10-CM

## 2018-12-25 DIAGNOSIS — Z79.2 LONG TERM (CURRENT) USE OF ANTIBIOTICS: ICD-10-CM

## 2018-12-25 DIAGNOSIS — M79.661 PAIN IN RIGHT LOWER LEG: ICD-10-CM

## 2018-12-25 DIAGNOSIS — L03.115 CELLULITIS OF RIGHT LOWER LIMB: ICD-10-CM

## 2018-12-25 DIAGNOSIS — E78.5 HYPERLIPIDEMIA, UNSPECIFIED: ICD-10-CM

## 2018-12-25 DIAGNOSIS — Z79.899 OTHER LONG TERM (CURRENT) DRUG THERAPY: ICD-10-CM

## 2018-12-25 DIAGNOSIS — Z79.02 LONG TERM (CURRENT) USE OF ANTITHROMBOTICS/ANTIPLATELETS: ICD-10-CM

## 2018-12-26 ENCOUNTER — INPATIENT (INPATIENT)
Facility: HOSPITAL | Age: 83
LOS: 1 days | Discharge: HOME | DRG: 603 | End: 2018-12-28
Attending: INTERNAL MEDICINE | Admitting: INTERNAL MEDICINE
Payer: MEDICARE

## 2018-12-26 VITALS
TEMPERATURE: 97 F | WEIGHT: 149.91 LBS | RESPIRATION RATE: 16 BRPM | OXYGEN SATURATION: 99 % | HEART RATE: 75 BPM | SYSTOLIC BLOOD PRESSURE: 153 MMHG | DIASTOLIC BLOOD PRESSURE: 74 MMHG

## 2018-12-26 DIAGNOSIS — R63.8 OTHER SYMPTOMS AND SIGNS CONCERNING FOOD AND FLUID INTAKE: ICD-10-CM

## 2018-12-26 DIAGNOSIS — Z96.653 PRESENCE OF ARTIFICIAL KNEE JOINT, BILATERAL: Chronic | ICD-10-CM

## 2018-12-26 DIAGNOSIS — M19.90 UNSPECIFIED OSTEOARTHRITIS, UNSPECIFIED SITE: ICD-10-CM

## 2018-12-26 DIAGNOSIS — L03.115 CELLULITIS OF RIGHT LOWER LIMB: ICD-10-CM

## 2018-12-26 DIAGNOSIS — H91.90 UNSPECIFIED HEARING LOSS, UNSPECIFIED EAR: ICD-10-CM

## 2018-12-26 DIAGNOSIS — M21.612 BUNION OF LEFT FOOT: ICD-10-CM

## 2018-12-26 DIAGNOSIS — R06.00 DYSPNEA, UNSPECIFIED: ICD-10-CM

## 2018-12-26 DIAGNOSIS — E87.1 HYPO-OSMOLALITY AND HYPONATREMIA: ICD-10-CM

## 2018-12-26 DIAGNOSIS — I63.9 CEREBRAL INFARCTION, UNSPECIFIED: ICD-10-CM

## 2018-12-26 DIAGNOSIS — L40.9 PSORIASIS, UNSPECIFIED: ICD-10-CM

## 2018-12-26 DIAGNOSIS — Z91.89 OTHER SPECIFIED PERSONAL RISK FACTORS, NOT ELSEWHERE CLASSIFIED: ICD-10-CM

## 2018-12-26 DIAGNOSIS — S72.92XA UNSPECIFIED FRACTURE OF LEFT FEMUR, INITIAL ENCOUNTER FOR CLOSED FRACTURE: Chronic | ICD-10-CM

## 2018-12-26 LAB
ALBUMIN SERPL ELPH-MCNC: 3.9 G/DL — SIGNIFICANT CHANGE UP (ref 3.3–5)
ALP SERPL-CCNC: 83 U/L — SIGNIFICANT CHANGE UP (ref 40–120)
ALT FLD-CCNC: 13 U/L — SIGNIFICANT CHANGE UP (ref 10–45)
ANION GAP SERPL CALC-SCNC: 10 MMOL/L — SIGNIFICANT CHANGE UP (ref 5–17)
APTT BLD: 20.3 SEC — LOW (ref 27.5–36.3)
AST SERPL-CCNC: 16 U/L — SIGNIFICANT CHANGE UP (ref 10–40)
BASOPHILS NFR BLD AUTO: 0.6 % — SIGNIFICANT CHANGE UP (ref 0–2)
BILIRUB SERPL-MCNC: 0.3 MG/DL — SIGNIFICANT CHANGE UP (ref 0.2–1.2)
BUN SERPL-MCNC: 13 MG/DL — SIGNIFICANT CHANGE UP (ref 7–23)
CALCIUM SERPL-MCNC: 8.8 MG/DL — SIGNIFICANT CHANGE UP (ref 8.4–10.5)
CHLORIDE SERPL-SCNC: 99 MMOL/L — SIGNIFICANT CHANGE UP (ref 96–108)
CO2 SERPL-SCNC: 24 MMOL/L — SIGNIFICANT CHANGE UP (ref 22–31)
CREAT SERPL-MCNC: 0.79 MG/DL — SIGNIFICANT CHANGE UP (ref 0.5–1.3)
EOSINOPHIL NFR BLD AUTO: 3.1 % — SIGNIFICANT CHANGE UP (ref 0–6)
GLUCOSE SERPL-MCNC: 109 MG/DL — HIGH (ref 70–99)
HCT VFR BLD CALC: 38 % — SIGNIFICANT CHANGE UP (ref 34.5–45)
HGB BLD-MCNC: 12.4 G/DL — SIGNIFICANT CHANGE UP (ref 11.5–15.5)
INR BLD: 1.02 — SIGNIFICANT CHANGE UP (ref 0.88–1.16)
LYMPHOCYTES # BLD AUTO: 18.3 % — SIGNIFICANT CHANGE UP (ref 13–44)
MCHC RBC-ENTMCNC: 28.2 PG — SIGNIFICANT CHANGE UP (ref 27–34)
MCHC RBC-ENTMCNC: 32.6 G/DL — SIGNIFICANT CHANGE UP (ref 32–36)
MCV RBC AUTO: 86.6 FL — SIGNIFICANT CHANGE UP (ref 80–100)
MONOCYTES NFR BLD AUTO: 12.5 % — SIGNIFICANT CHANGE UP (ref 2–14)
NEUTROPHILS NFR BLD AUTO: 65.5 % — SIGNIFICANT CHANGE UP (ref 43–77)
PLATELET # BLD AUTO: 255 K/UL — SIGNIFICANT CHANGE UP (ref 150–400)
POTASSIUM SERPL-MCNC: 4.4 MMOL/L — SIGNIFICANT CHANGE UP (ref 3.5–5.3)
POTASSIUM SERPL-SCNC: 4.4 MMOL/L — SIGNIFICANT CHANGE UP (ref 3.5–5.3)
PROT SERPL-MCNC: 6.4 G/DL — SIGNIFICANT CHANGE UP (ref 6–8.3)
PROTHROM AB SERPL-ACNC: 11.5 SEC — SIGNIFICANT CHANGE UP (ref 10–12.9)
RBC # BLD: 4.39 M/UL — SIGNIFICANT CHANGE UP (ref 3.8–5.2)
RBC # FLD: 14 % — SIGNIFICANT CHANGE UP (ref 10.3–16.9)
SODIUM SERPL-SCNC: 133 MMOL/L — LOW (ref 135–145)
WBC # BLD: 4.8 K/UL — SIGNIFICANT CHANGE UP (ref 3.8–10.5)
WBC # FLD AUTO: 4.8 K/UL — SIGNIFICANT CHANGE UP (ref 3.8–10.5)

## 2018-12-26 PROCEDURE — 99285 EMERGENCY DEPT VISIT HI MDM: CPT

## 2018-12-26 RX ORDER — HEPARIN SODIUM 5000 [USP'U]/ML
5000 INJECTION INTRAVENOUS; SUBCUTANEOUS EVERY 8 HOURS
Qty: 0 | Refills: 0 | Status: DISCONTINUED | OUTPATIENT
Start: 2018-12-26 | End: 2018-12-28

## 2018-12-26 RX ORDER — CLOPIDOGREL BISULFATE 75 MG/1
75 TABLET, FILM COATED ORAL DAILY
Qty: 0 | Refills: 0 | Status: DISCONTINUED | OUTPATIENT
Start: 2018-12-26 | End: 2018-12-28

## 2018-12-26 RX ORDER — VANCOMYCIN HCL 1 G
750 VIAL (EA) INTRAVENOUS EVERY 12 HOURS
Qty: 0 | Refills: 0 | Status: DISCONTINUED | OUTPATIENT
Start: 2018-12-26 | End: 2018-12-26

## 2018-12-26 RX ORDER — VANCOMYCIN HCL 1 G
1000 VIAL (EA) INTRAVENOUS ONCE
Qty: 0 | Refills: 0 | Status: COMPLETED | OUTPATIENT
Start: 2018-12-26 | End: 2018-12-26

## 2018-12-26 RX ORDER — VANCOMYCIN HCL 1 G
1000 VIAL (EA) INTRAVENOUS EVERY 24 HOURS
Qty: 0 | Refills: 0 | Status: DISCONTINUED | OUTPATIENT
Start: 2018-12-27 | End: 2018-12-27

## 2018-12-26 RX ORDER — ASPIRIN/CALCIUM CARB/MAGNESIUM 324 MG
81 TABLET ORAL DAILY
Qty: 0 | Refills: 0 | Status: DISCONTINUED | OUTPATIENT
Start: 2018-12-26 | End: 2018-12-28

## 2018-12-26 RX ORDER — ATORVASTATIN CALCIUM 80 MG/1
40 TABLET, FILM COATED ORAL AT BEDTIME
Qty: 0 | Refills: 0 | Status: DISCONTINUED | OUTPATIENT
Start: 2018-12-26 | End: 2018-12-28

## 2018-12-26 RX ADMIN — ATORVASTATIN CALCIUM 40 MILLIGRAM(S): 80 TABLET, FILM COATED ORAL at 22:39

## 2018-12-26 RX ADMIN — Medication 250 MILLIGRAM(S): at 13:01

## 2018-12-26 RX ADMIN — HEPARIN SODIUM 5000 UNIT(S): 5000 INJECTION INTRAVENOUS; SUBCUTANEOUS at 22:39

## 2018-12-26 NOTE — ED ADULT NURSE NOTE - OBJECTIVE STATEMENT
The pt is a 89 y/o female who came in to ED for evaluation right lower leg pain. Pt reports that she was in  ED on 12/21/18 and was diagnosed with cellulitis and was started on Bactrim and Kelfex. Pt reports good adherence to antibiotics. Pt states redness has decreased, however she has noticed some skin breakage that is new and wanted to be evaluated for that. Scaly skin around right ankle noted, some small skin breaks noted.

## 2018-12-26 NOTE — ED PROVIDER NOTE - EYES NEGATIVE STATEMENT, MLM
no discharge, no irritation, no pain, no redness, and no visual changes. Type and Screen/INR/CBC/PT/PTT/HCG/CXR/BMP/Results in MD note/EKG

## 2018-12-26 NOTE — H&P ADULT - ASSESSMENT
Pt is a 89 y/o F with PMHx of stroke who presents with RLE cellulitis after failing outpatient oral antibiotic therapy. Pt was given vancomycin in the ED.

## 2018-12-26 NOTE — H&P ADULT - NSHPSOCIALHISTORY_GEN_ALL_CORE
Pt lives at home alone. She has a  who comes for 4-5 hours a day to help her around the house.  She does not smoke.  She drinks alcohol socially.

## 2018-12-26 NOTE — CONSULT NOTE ADULT - ASSESSMENT
Patient is a 89 year old female with  past medical history CVA who presented with RLE cellulitis not improving on oral Bactrim and Keflex

## 2018-12-26 NOTE — H&P ADULT - NSHPLABSRESULTS_GEN_ALL_CORE
.  LABS:                         12.4   4.8   )-----------( 255      ( 26 Dec 2018 12:57 )             38.0     12-26    133<L>  |  99  |  13  ----------------------------<  109<H>  4.4   |  24  |  0.79    Ca    8.8      26 Dec 2018 12:57    TPro  6.4  /  Alb  3.9  /  TBili  0.3  /  DBili  x   /  AST  16  /  ALT  13  /  AlkPhos  83  12-26    PT/INR - ( 26 Dec 2018 12:57 )   PT: 11.5 sec;   INR: 1.02          PTT - ( 26 Dec 2018 12:57 )  PTT:20.3 sec              RADIOLOGY, EKG & ADDITIONAL TESTS: Reviewed.

## 2018-12-26 NOTE — H&P ADULT - NSHPREVIEWOFSYSTEMS_GEN_ALL_CORE
Dr. Lazar Friend, please see message below from [facility]. REVIEW OF SYSTEMS:    CONSTITUTIONAL: Patient denies weakness, fevers or chills  EYES/ENT: Patient denies visual changes;  denies vertigo or throat pain   NECK: Patient denies pain or stiffness  RESPIRATORY: Patient denies cough, wheezing, hemoptysis; denies shortness of breath  CARDIOVASCULAR: Patient denies chest pain or palpitations  GASTROINTESTINAL: Patient denies abdominal or epigastric pain, nausea, vomiting, or hematemesis, diarrhea or constipation, melena or hematochezia.  GENITOURINARY: Patient denies dysuria, frequency or hematuria  NEUROLOGICAL: Patient denies numbness or weakness  SKIN: Patient with redness, swelling, and pain at right lower extremity, non itchy,   All other review of systems is negative unless indicated above.

## 2018-12-26 NOTE — H&P ADULT - HISTORY OF PRESENT ILLNESS
Pt is a 89 y/o F with PMHx of stroke who presented to [facility] with RLE erythema, warmth, and pain. Pt presented to the ED 5 days ago 12/21 for the same complaints and was diagnosed with cellulitis and discharged from the ED with Keflex and Bactrim. Pt states that the redness and swelling improved a little initially, but worsened over the past 2-3 days. Pt states that the skin has started to slough off a little and the erythema goes from his ankle to mid shin.   In the ED, VS: T 97.4, HR 75, /74, RR 16, SpO2 99%. Pt was given 1g IV vancomycin.   Pt states that the pain and swelling has been decreasing and that since the antibiotics today, she has noticed that the redness has decreased. She denies fever, chills, nausea, SOB, chest pain. Pt is a 89 y/o F with PMHx of stroke who presented to [facility] with RLE erythema, warmth, and pain. Pt presented to the ED 5 days ago 12/21 for the same complaints and was diagnosed with cellulitis and discharged from the ED with Keflex and Bactrim. Pt states that the redness and swelling improved a little initially, but worsened over the past 2-3 days. Pt states that the skin has started to slough off a little and the erythema goes from her ankle to mid shin. She has no other complaints and this has not stopped her from walking around or taking part in physical therapy.   In the ED, VS: T 97.4, HR 75, /74, RR 16, SpO2 99%. Pt was given 1g IV vancomycin.   Pt states that the pain and swelling has been decreasing and that since the antibiotics today, she has noticed that the redness has decreased. She denies fever, chills, nausea, SOB, chest pain. Pt is a 89 y/o  F retired  ([employer]) with PMHx of stroke who presented to [facility] with RLE erythema, warmth, and pain. Pt presented to the ED 5 days ago 12/21 for the same complaints and was diagnosed with cellulitis and discharged from the ED with Keflex and Bactrim. Pt states that the redness and swelling improved a little initially, but worsened over the past 2-3 days. Pt states that the skin has started to slough off a little and the erythema goes from her ankle to mid shin. She has no other complaints and this has not stopped her from walking around or taking part in physical therapy.   In the ED, VS: T 97.4, HR 75, /74, RR 16, SpO2 99%. Pt was given 1g IV vancomycin.   Pt states that the pain and swelling has been decreasing and that since the antibiotics today, she has noticed that the redness has decreased. She denies fever, chills, nausea, SOB, chest pain.

## 2018-12-26 NOTE — H&P ADULT - PROBLEM SELECTOR PLAN 2
Pt with past hx of stroke that she states was in June  - no residual deficits  - c/w ASA, plavix, statin

## 2018-12-26 NOTE — ED PROVIDER NOTE - MEDICAL DECISION MAKING DETAILS
89 yo F with recurrent cellulitis -s/p 5th day of bactrim keflex with continued pain swelling no fevers or chills  no signs of ischemia will admit for failure of oupt rx  IV vanco ordered

## 2018-12-26 NOTE — CONSULT NOTE ADULT - PROBLEM SELECTOR RECOMMENDATION 9
1) Send RLE culture from abrasion; send ESR, CRP  2) Start Vancomycin 1gr IV q24h for now, check trough w/ 3rd dose

## 2018-12-26 NOTE — ED PROVIDER NOTE - OBJECTIVE STATEMENT
89 yo F seen in Ed 5 day ago for cellulitis RLE on bactrim keflex  had neg doppler for DVT as well  now c/o of increased pain rle  redness and tenderness- was improving after a few days- now with increased pain warmth no sig swelling 89 yo F seen in ED 5 day ago for cellulitis RLE on bactrim keflex  had neg doppler for DVT as well  now c/o of increased pain rle  redness and tenderness- was improving after a few days- now with increased pain warmth no sig swelling

## 2018-12-26 NOTE — ED ADULT TRIAGE NOTE - CHIEF COMPLAINT QUOTE
Pt , seen here five days ago and diagnosed w cellulitis, returns stating her RLE is still painful, red, dry and scaly, has not been able to see a dermatologist yet. Denies fevers or chills - states she is compliant w rxed abx.

## 2018-12-26 NOTE — CONSULT NOTE ADULT - SUBJECTIVE AND OBJECTIVE BOX
HPI:  Pt is a 89 y/o F with PMHx of stroke who presented to [facility] with RLE erythema, warmth, and pain. Pt presented to the ED 5 days ago 12/21 for the same complaints and was diagnosed with cellulitis and discharged from the ED with Keflex and Bactrim. Pt states that the redness and swelling improved a little initially, but worsened over the past 2-3 days. Pt states that the skin has started to slough off a little and the erythema goes from her ankle to mid shin. She has no other complaints and this has not stopped her from walking around or taking part in physical therapy.   In the ED, VS: T 97.4, HR 75, /74, RR 16, SpO2 99%. Pt was given 1g IV vancomycin.   Pt states that the pain and swelling has been decreasing and that since the antibiotics today, she has noticed that the redness has decreased. She denies fever, chills, nausea, SOB, chest pain.      PAST MEDICAL & SURGICAL HISTORY:  Stroke  History of total bilateral knee replacement  Closed left femoral fracture        REVIEW OF SYSTEMS:    General:  no weakness; no fevers, no chills  Skin/Breast: no rash  Respiratory and Thorax: no SOB, no cough  Cardiovascular:	No chest pain  Gastrointestinal:	 no nausea, vomiting , diarrhea  Genitourinary:	no dysuria, no difficulty urinating, no hematuria  Musculoskeletal:	no weakness, RLE/ankle swelling/redness  Neurological:	no focal weakness/numbness  Endocrine: no polyuria, no polydipsia      ANTIBIOTICS:  MEDICATIONS  (STANDING):  aspirin enteric coated 81 milliGRAM(s) Oral daily  atorvastatin 40 milliGRAM(s) Oral at bedtime  clopidogrel Tablet 75 milliGRAM(s) Oral daily    MEDICATIONS  (PRN):      Allergies: No Known Allergies    SOCIAL HISTORY: No smoking, no ETOH    FAMILY HISTORY:  No pertinent family history in first degree relatives      Vital Signs Last 24 Hrs  T(C): 36.7 (26 Dec 2018 14:47), Max: 36.7 (26 Dec 2018 14:23)  T(F): 98.1 (26 Dec 2018 14:47), Max: 98.1 (26 Dec 2018 14:23)  HR: 62 (26 Dec 2018 14:47) (62 - 75)  BP: 155/86 (26 Dec 2018 14:47) (153/74 - 176/77)  RR: 16 (26 Dec 2018 14:47) (16 - 16)  SpO2: 99% (26 Dec 2018 14:47) (99% - 99%)      PHYSICAL EXAM:  Constitutional: Well-developed, well nourished  Eyes:SANJU, EOMI  Ear/Nose/Throat: no oral lesion, no sinus tenderness on percussion	  Neck:no JVD, no lymphadenopathy, supple  Respiratory: CTA carlos enrique  Cardiovascular: S1S2 RRR, no murmurs  Gastrointestinal:soft, (+) BS, no HSM  Extremities: RLE Erythema and edema up to knee	, (+) abrasion  Vascular: DP Pulse: right normal; left normal      LABS:                        12.4   4.8   )-----------( 255      ( 26 Dec 2018 12:57 )             38.0     12-26    133<L>  |  99  |  13  ----------------------------<  109<H>  4.4   |  24  |  0.79    Ca    8.8      26 Dec 2018 12:57    TPro  6.4  /  Alb  3.9  /  TBili  0.3  /  DBili  x   /  AST  16  /  ALT  13  /  AlkPhos  83  12-26    PT/INR - ( 26 Dec 2018 12:57 )   PT: 11.5 sec;   INR: 1.02          PTT - ( 26 Dec 2018 12:57 )  PTT:20.3 sec      MICROBIOLOGY:None    RADIOLOGY & ADDITIONAL STUDIES:  < from: US Duplex Venous Lower Ext Ltd, Right (12.21.18 @ 16:36) >    EXAM:  US DPLX LWR EXT VEINS LTD RT                          PROCEDURE DATE:  12/21/2018          INTERPRETATION:      VENOUS DUPLEX DOPPLER OF THE RIGHT LOWER EXTREMITY dated 12/21/2018 4:36   PM    INDICATION: r/o DVT RLE    TECHNIQUE: Duplex Doppler evaluation including gray-scale ultrasound   imaging, color flow Doppler imaging, and Doppler spectral analysis of the   veins of the right lower extremity was performed.     COMPARISON: None    FINDINGS:    Thigh veins: The right common femoral,femoral, popliteal, proximal   greater saphenous, and proximal deep femoral veins are patent and free of   thrombus. The veins are normally compressible and have normal phasic flow   and augmentation response.    Calf veins: The paired peroneal and posterior tibial calf veins are well   visualized and patent. There is subcutaneous calf edema.    Contralateral CFV: The contralateral (left) common femoral vein is patent   and free of thrombus.      IMPRESSION:  No deep venous thrombosis seen above the knee.  Subcutaneous calf edema.

## 2018-12-26 NOTE — H&P ADULT - PROBLEM SELECTOR PLAN 1
Pt with RLE erythema, swelling, and skin changes  - pt failed oral outpatient antibiotics starting on 12/21  - started on vancomycin 1g, will continue with 1g q12hrs  - f/u bcx  - no WBC count Pt with RLE erythema, swelling, and skin changes  - pt failed oral outpatient antibiotics starting on 12/21  - started on vancomycin 1g, will continue with 1g q12hrs, vanc trough before 4th dose  - f/u bcx  - no WBC count Pt with RLE erythema, swelling, and skin changes  - pt failed oral outpatient antibiotics starting on 12/21  - started on vancomycin 1g, will continue with 1g q24hrs, vanc trough before 4th dose  - f/u bcx  - no WBC count Pt with RLE erythema, swelling, and skin changes  - pt failed oral outpatient antibiotics starting on 12/21  - started on vancomycin 1g, will continue with 750 mg q12hrs, vanc trough before 4th dose  - f/u bcx  - no WBC count  OBTAIN ESR and CRP Pt with RLE erythema, swelling, and skin changes  - pt failed oral outpatient antibiotics starting on 12/21  - started on vancomycin 1g, will continue with 1g q24hrs, vanc trough before 4th dose  - ID has confirmed the 1g q24 hrs and following their recs  - f/u bcx  - no WBC count  - f/u ESR and CRP

## 2018-12-27 DIAGNOSIS — G47.9 SLEEP DISORDER, UNSPECIFIED: ICD-10-CM

## 2018-12-27 DIAGNOSIS — L53.9 ERYTHEMATOUS CONDITION, UNSPECIFIED: ICD-10-CM

## 2018-12-27 DIAGNOSIS — R60.9 EDEMA, UNSPECIFIED: ICD-10-CM

## 2018-12-27 LAB
ANION GAP SERPL CALC-SCNC: 14 MMOL/L — SIGNIFICANT CHANGE UP (ref 5–17)
BASOPHILS NFR BLD AUTO: 1 % — SIGNIFICANT CHANGE UP (ref 0–2)
BUN SERPL-MCNC: 12 MG/DL — SIGNIFICANT CHANGE UP (ref 7–23)
CALCIUM SERPL-MCNC: 8.6 MG/DL — SIGNIFICANT CHANGE UP (ref 8.4–10.5)
CHLORIDE SERPL-SCNC: 101 MMOL/L — SIGNIFICANT CHANGE UP (ref 96–108)
CO2 SERPL-SCNC: 22 MMOL/L — SIGNIFICANT CHANGE UP (ref 22–31)
CREAT SERPL-MCNC: 0.76 MG/DL — SIGNIFICANT CHANGE UP (ref 0.5–1.3)
CRP SERPL-MCNC: 0.13 MG/DL — SIGNIFICANT CHANGE UP (ref 0–0.4)
EOSINOPHIL NFR BLD AUTO: 4.6 % — SIGNIFICANT CHANGE UP (ref 0–6)
ERYTHROCYTE [SEDIMENTATION RATE] IN BLOOD: 5 MM/HR — SIGNIFICANT CHANGE UP
GLUCOSE SERPL-MCNC: 129 MG/DL — HIGH (ref 70–99)
HCT VFR BLD CALC: 36.3 % — SIGNIFICANT CHANGE UP (ref 34.5–45)
HGB BLD-MCNC: 11.9 G/DL — SIGNIFICANT CHANGE UP (ref 11.5–15.5)
LYMPHOCYTES # BLD AUTO: 18.9 % — SIGNIFICANT CHANGE UP (ref 13–44)
MAGNESIUM SERPL-MCNC: 2.1 MG/DL — SIGNIFICANT CHANGE UP (ref 1.6–2.6)
MCHC RBC-ENTMCNC: 27.5 PG — SIGNIFICANT CHANGE UP (ref 27–34)
MCHC RBC-ENTMCNC: 32.8 G/DL — SIGNIFICANT CHANGE UP (ref 32–36)
MCV RBC AUTO: 83.8 FL — SIGNIFICANT CHANGE UP (ref 80–100)
MONOCYTES NFR BLD AUTO: 8.2 % — SIGNIFICANT CHANGE UP (ref 2–14)
NEUTROPHILS NFR BLD AUTO: 67.3 % — SIGNIFICANT CHANGE UP (ref 43–77)
PLATELET # BLD AUTO: 237 K/UL — SIGNIFICANT CHANGE UP (ref 150–400)
POTASSIUM SERPL-MCNC: 4 MMOL/L — SIGNIFICANT CHANGE UP (ref 3.5–5.3)
POTASSIUM SERPL-SCNC: 4 MMOL/L — SIGNIFICANT CHANGE UP (ref 3.5–5.3)
RBC # BLD: 4.33 M/UL — SIGNIFICANT CHANGE UP (ref 3.8–5.2)
RBC # FLD: 13.9 % — SIGNIFICANT CHANGE UP (ref 10.3–16.9)
SODIUM SERPL-SCNC: 137 MMOL/L — SIGNIFICANT CHANGE UP (ref 135–145)
WBC # BLD: 4.2 K/UL — SIGNIFICANT CHANGE UP (ref 3.8–10.5)
WBC # FLD AUTO: 4.2 K/UL — SIGNIFICANT CHANGE UP (ref 3.8–10.5)

## 2018-12-27 RX ORDER — VANCOMYCIN HCL 1 G
500 VIAL (EA) INTRAVENOUS ONCE
Qty: 0 | Refills: 0 | Status: DISCONTINUED | OUTPATIENT
Start: 2018-12-27 | End: 2018-12-27

## 2018-12-27 RX ORDER — PETROLATUM,WHITE
1 JELLY (GRAM) TOPICAL THREE TIMES A DAY
Qty: 0 | Refills: 0 | Status: DISCONTINUED | OUTPATIENT
Start: 2018-12-27 | End: 2018-12-28

## 2018-12-27 RX ORDER — ZOLPIDEM TARTRATE 10 MG/1
5 TABLET ORAL ONCE
Qty: 0 | Refills: 0 | Status: DISCONTINUED | OUTPATIENT
Start: 2018-12-27 | End: 2018-12-27

## 2018-12-27 RX ORDER — VANCOMYCIN HCL 1 G
750 VIAL (EA) INTRAVENOUS EVERY 12 HOURS
Qty: 0 | Refills: 0 | Status: DISCONTINUED | OUTPATIENT
Start: 2018-12-28 | End: 2018-12-28

## 2018-12-27 RX ORDER — VANCOMYCIN HCL 1 G
500 VIAL (EA) INTRAVENOUS EVERY 8 HOURS
Qty: 0 | Refills: 0 | Status: DISCONTINUED | OUTPATIENT
Start: 2018-12-27 | End: 2018-12-27

## 2018-12-27 RX ORDER — ZOLPIDEM TARTRATE 10 MG/1
5 TABLET ORAL AT BEDTIME
Qty: 0 | Refills: 0 | Status: DISCONTINUED | OUTPATIENT
Start: 2018-12-27 | End: 2018-12-28

## 2018-12-27 RX ADMIN — Medication 81 MILLIGRAM(S): at 11:56

## 2018-12-27 RX ADMIN — Medication 1 APPLICATION(S): at 13:59

## 2018-12-27 RX ADMIN — ATORVASTATIN CALCIUM 40 MILLIGRAM(S): 80 TABLET, FILM COATED ORAL at 22:25

## 2018-12-27 RX ADMIN — ZOLPIDEM TARTRATE 5 MILLIGRAM(S): 10 TABLET ORAL at 02:59

## 2018-12-27 RX ADMIN — Medication 1 APPLICATION(S): at 22:25

## 2018-12-27 RX ADMIN — Medication 250 MILLIGRAM(S): at 11:58

## 2018-12-27 RX ADMIN — ZOLPIDEM TARTRATE 5 MILLIGRAM(S): 10 TABLET ORAL at 23:08

## 2018-12-27 NOTE — PROGRESS NOTE ADULT - PROBLEM SELECTOR PLAN 1
1) Increase Vancomycin to 750mg IV q12h, check Vancomycin trough in AM  2) Normal ESR, CRP, Blood culture negative; RLE wound culture not sent.  3) In AM if improved call  to If possible authorise oral Linezolid 600mg PO q12h x 10 days; if not feasible may discharge on oral Doxycycline 100mg bid x 10 days

## 2018-12-27 NOTE — PROGRESS NOTE ADULT - PROBLEM SELECTOR PLAN 1
Pt with RLE erythema, swelling, and skin changes  - pt failed oral outpatient antibiotics starting on 12/21  - started on vancomycin 1g, will continue with 1g q24hrs, vanc trough before 4th dose  - ID has confirmed the 1g q24 hrs and following their recs  - f/u bcx  - no WBC count  - f/u ESR and CRP Pt with RLE erythema, swelling, and skin changes  -patient afebrile without any leukocytosis   -presented to ED on 12/21 and given 5 days of keflex and bactrim without any improvement  -c/w vancomycin 1g q24hrs, vanc trough before 4th dose  -blood culture negative to date  - ID has confirmed the 1g q24 hrs and following their recs  - no WBC count  - f/u ESR and CRP

## 2018-12-27 NOTE — PROGRESS NOTE ADULT - PROBLEM SELECTOR PLAN 3
has failed 3 months of outpatient treatment
Mild hyponatremia to 133  - likely due to decreased PO intake  - will continue to monitor

## 2018-12-27 NOTE — PROGRESS NOTE ADULT - SUBJECTIVE AND OBJECTIVE BOX
CCM     INTERVAL HPI/OVERNIGHT EVENTS:    edema  erythema  pain   exudate    PAST MEDICAL & SURGICAL HISTORY:  Stroke  History of total bilateral knee replacement  Closed left femoral fracture    FAMILY HISTORY:  No pertinent family history in first degree relatives      SOCIAL HISTORY:    REVIEW OF SYSTEMS: no significant change  Constitutional: () weight change, () fever,  () chills, () fatigue, () night sweats  Eyes: () discharge, () eye pain, () visual change  ENT:  () hearing difficulty, () vertigo, () sinus pain,  () throat pain, () epistaxis, () dysphagia, () hoarseness  Neck: () pain, () stiffness, () swelling  Respiratory: () cough, () wheezing, () hemoptysis      Cardiovascular: () chest pain, ()palpitations, () dizziness   Gastrointestinal: () abdominal pain, () nausea, () vomiting, () hematemesis, () diarrhea,  () constipation, () melena  Genitourinary:  () dysuria, () frequency, () hematuria, () incontinence      Neurologic: () headache, () memory loss, () loss of strength, () numbness, () tremor     Skin: () itching, () burning, () rash, () lesions   Lymphatic: () enlarged lymph nodes  Endocrine: () hair loss, () temperature intolerance         Musculoskeletal: () back pain, () joint pain,  () extremity pain  Psychiatric: () visual change, () auditory change, () depression, () anxiety, () suicidal  Sleep: () disorder, () insomnia, () sleep deprivation  Heme/Lymph: () easy bruising, () bleeding gums            Allergy and Immunologic: () hives, () eczema    Vital Signs Last 24 Hrs  T(C): 36.7 (27 Dec 2018 16:26), Max: 36.7 (26 Dec 2018 21:42)  T(F): 98 (27 Dec 2018 16:26), Max: 98.1 (26 Dec 2018 21:42)  HR: 62 (27 Dec 2018 16:26) (60 - 73)  BP: 124/64 (27 Dec 2018 16:26) (108/50 - 132/67)  BP(mean): --  RR: 18 (27 Dec 2018 16:26) (16 - 18)  SpO2: 97% (27 Dec 2018 16:26) (95% - 97%)    I&O's Detail    PHYSICAL EXAM:  in bed  Well nourished, well developed, comfortable, - acute distress; vital signs are monitored  Eyes: PERRLA, EOMI, -conjunctivitis, -scleritis   Head: no focal deficit, normocephalic,  no trauma  ENMT: moist tongue, no thrush, -nasal discharge, -hoarseness, normal hearing, -cough, -hemoptysis, trachea midline  Neck: supple, - lymphadenopathy,  -masses, -JVD  Respiratory: bilateral diminished breath sounds, -wheezing, -rhonchi, -rales, -crackles  Chest: -accessory muscle use, -paradoxical breathing  Cardiovascular: regular rate and sinus rhythm, S1 S2 normal, -S3, -S4, -murmur, -gallop, -rub  Gastrointestinal: soft, nontender, nondistended, normal bowel sounds, no hepatosplenomegaly  Genitourinary: -flank pain, -dysuria  Extremities: -clubbing, -cyanosis, + edema + discharge     Vascular: peripheral pulses palpable 2+ bilaterally  Neurological: alert, oriented x 3, no focal deficit, -tremor   Skin: warm, dry,  + erythema, iv site intact  Lymph nodes; no cervical, supraclavicular or axillary adenopathy  Psychiatric: cooperative, appropriate mood      MEDICATIONS  (STANDING):  AQUAPHOR (petrolatum Ointment) 1 Application(s) Topical three times a day  aspirin enteric coated 81 milliGRAM(s) Oral daily  atorvastatin 40 milliGRAM(s) Oral at bedtime  clopidogrel Tablet 75 milliGRAM(s) Oral daily  heparin  Injectable 5000 Unit(s) SubCutaneous every 8 hours  vancomycin  IVPB 500 milliGRAM(s) IV Intermittent once    MEDICATIONS  (PRN):      Allergies    No Known Allergies    Intolerances        LABS:                        11.9   4.2   )-----------( 237      ( 27 Dec 2018 05:57 )             36.3     12-27    137  |  101  |  12  ----------------------------<  129<H>  4.0   |  22  |  0.76    Ca    8.6      27 Dec 2018 05:57  Mg     2.1     12-27    TPro  6.4  /  Alb  3.9  /  TBili  0.3  /  DBili  x   /  AST  16  /  ALT  13  /  AlkPhos  83  12-26    PT/INR - ( 26 Dec 2018 12:57 )   PT: 11.5 sec;   INR: 1.02     PTT - ( 26 Dec 2018 12:57 )  PTT:20.3 sec    +DVT prophylaxis  5000 q8  +Sleep    +Nutrition goals ORAL  -Pain  -Decubital ulcer  +GI prophylaxis (PPV, coagulopathy, Hx)  +Aspiration prophylaxis (45 degrees)  +Sedation/analgesia stopped once  +ID (phos, CH, mupi, SB)  -Delirium  +Cardiac /ASA/statin AND AMLODIPINE  +Prevention  +Education  +Medication reviewed (drug-drug interactions, PDA)  Medical devices  Discussed with PGY, CCRN    RADIOLOGY & ADDITIONAL STUDIES:
INTERVAL HPI/OVERNIGHT EVENTS: RLE improving, still significant erythema and edema    CONSTITUTIONAL:  Negative fever or chills, feels well, good appetite  EYES:  Negative  blurry vision or double vision  CARDIOVASCULAR:  Negative for chest pain or palpitations  RESPIRATORY:  Negative for cough, wheezing, or SOB   GASTROINTESTINAL:  Negative for nausea, vomiting, diarrhea, constipation, or abdominal pain  GENITOURINARY:  Negative frequency, urgency or dysuria  NEUROLOGIC:  No headache, confusion, dizziness, lightheadedness      ANTIBIOTICS/RELEVANT:    MEDICATIONS  (STANDING):  AQUAPHOR (petrolatum Ointment) 1 Application(s) Topical three times a day  aspirin enteric coated 81 milliGRAM(s) Oral daily  atorvastatin 40 milliGRAM(s) Oral at bedtime  clopidogrel Tablet 75 milliGRAM(s) Oral daily  heparin  Injectable 5000 Unit(s) SubCutaneous every 8 hours  vancomycin  IVPB 500 milliGRAM(s) IV Intermittent once    MEDICATIONS  (PRN):        Vital Signs Last 24 Hrs  T(C): 36.7 (27 Dec 2018 16:26), Max: 36.7 (26 Dec 2018 21:42)  T(F): 98 (27 Dec 2018 16:26), Max: 98.1 (26 Dec 2018 21:42)  HR: 62 (27 Dec 2018 16:26) (60 - 73)  BP: 124/64 (27 Dec 2018 16:26) (108/50 - 132/67)  BP(mean): --  RR: 18 (27 Dec 2018 16:26) (16 - 18)  SpO2: 97% (27 Dec 2018 16:26) (95% - 97%)    PHYSICAL EXAM:  Constitutional: Elderly, well-developed, well nourished  Eyes:SANJU, EOMI  Ear/Nose/Throat: no oral lesion, no sinus tenderness on percussion	  Neck:no JVD, no lymphadenopathy, supple  Respiratory: CTA carlos enrique  Cardiovascular: S1S2 RRR, no murmurs  Gastrointestinal: soft, (+) BS, no HSM  Extremities: right lower extremity erythematous, scaling,  Vascular: DP Pulse: right normal; left normal      LABS:  Sedimentation Rate, Erythrocyte (12.27.18 @ 05:57)    Sedimentation Rate, Erythrocyte: 5 mm/Hr    C-Reactive Protein, Serum (12.27.18 @ 05:57)    C-Reactive Protein, Serum: 0.13 mg/dL                            11.9   4.2   )-----------( 237      ( 27 Dec 2018 05:57 )             36.3     12-27    137  |  101  |  12  ----------------------------<  129<H>  4.0   |  22  |  0.76    Ca    8.6      27 Dec 2018 05:57  Mg     2.1     12-27    TPro  6.4  /  Alb  3.9  /  TBili  0.3  /  DBili  x   /  AST  16  /  ALT  13  /  AlkPhos  83  12-26    PT/INR - ( 26 Dec 2018 12:57 )   PT: 11.5 sec;   INR: 1.02          PTT - ( 26 Dec 2018 12:57 )  PTT:20.3 sec      MICROBIOLOGY:  Culture - Blood (12.26.18 @ 17:38)    Specimen Source: .Blood Blood    Culture Results:   No growth at 1 day.        RADIOLOGY & ADDITIONAL STUDIES:
INTERVAL HPI/OVERNIGHT EVENTS:  Patient was seen and examined at bedside. Patient wishes to go home but states that the vancomycin has improved the right lower extremity swelling.     VITAL SIGNS:  T(F): 98.1 (12-27-18 @ 09:55)  HR: 60 (12-27-18 @ 09:55)  BP: 132/67 (12-27-18 @ 09:55)  RR: 17 (12-27-18 @ 09:55)  SpO2: 96% (12-27-18 @ 09:55)  Wt(kg): --    PHYSICAL EXAM:    Constitutional: Elderly female, pleasant, approachable, NAD  HEENT:  neck supple, trachea midline, no masses, no JVD, MMM, good dentition  Respiratory: CTA b/l, good air entry b/l, no wheezing, no rhonchi, no rales, without accessory muscle use and no intercostal retractions  Cardiovascular: RRR, normal S1S2, no M/R/G  Gastrointestinal: soft, NTND, no masses palpable, BS normal  Extremities: Warm, well perfused, 2+ pulses equal bilateral upper and lower extremities, no edema, no clubbing, right lower extremity erythematous, scaling, nonfluctuant   Neurological: AAOx3,  Skin: Normal temperature, warm, dry    MEDICATIONS  (STANDING):  AQUAPHOR (petrolatum Ointment) 1 Application(s) Topical three times a day  aspirin enteric coated 81 milliGRAM(s) Oral daily  atorvastatin 40 milliGRAM(s) Oral at bedtime  clopidogrel Tablet 75 milliGRAM(s) Oral daily  heparin  Injectable 5000 Unit(s) SubCutaneous every 8 hours  vancomycin  IVPB 1000 milliGRAM(s) IV Intermittent every 24 hours    MEDICATIONS  (PRN):      Allergies    No Known Allergies    Intolerances        LABS:                        11.9   4.2   )-----------( 237      ( 27 Dec 2018 05:57 )             36.3     12-27    137  |  101  |  12  ----------------------------<  129<H>  4.0   |  22  |  0.76    Ca    8.6      27 Dec 2018 05:57  Mg     2.1     12-27    TPro  6.4  /  Alb  3.9  /  TBili  0.3  /  DBili  x   /  AST  16  /  ALT  13  /  AlkPhos  83  12-26    PT/INR - ( 26 Dec 2018 12:57 )   PT: 11.5 sec;   INR: 1.02          PTT - ( 26 Dec 2018 12:57 )  PTT:20.3 sec      RADIOLOGY & ADDITIONAL TESTS:  Reviewed

## 2018-12-27 NOTE — PROGRESS NOTE ADULT - ASSESSMENT
Patient is a 89 year old female with  past medical history CVA who presented with RLE cellulitis not improving on oral Bactrim and Keflex
Pt is a 89 y/o F with PMHx of stroke who presents with RLE cellulitis after failing outpatient oral antibiotic therapy. Pt was given vancomycin in the ED.

## 2018-12-27 NOTE — PHYSICAL THERAPY INITIAL EVALUATION ADULT - PERTINENT HX OF CURRENT PROBLEM, REHAB EVAL
Pt presented to the ED 5 days ago 12/21 for the same complaints and was diagnosed with cellulitis and discharged from the ED with Keflex and Bactrim. Pt states that the redness and swelling improved a little initially, but worsened over the past 2-3 days. Pt states that the skin has started to slough off a little and the erythema goes from her ankle to mid shin.

## 2018-12-27 NOTE — PHYSICAL THERAPY INITIAL EVALUATION ADULT - ADDITIONAL COMMENTS
Pt uses a cane at baseline. Pt lives in an elevator building, no steps to enter. Pt lives alone and uses a  regularly

## 2018-12-28 ENCOUNTER — INBOUND DOCUMENT (OUTPATIENT)
Age: 83
End: 2018-12-28

## 2018-12-28 ENCOUNTER — TRANSCRIPTION ENCOUNTER (OUTPATIENT)
Age: 83
End: 2018-12-28

## 2018-12-28 VITALS
RESPIRATION RATE: 17 BRPM | DIASTOLIC BLOOD PRESSURE: 65 MMHG | OXYGEN SATURATION: 96 % | HEART RATE: 78 BPM | SYSTOLIC BLOOD PRESSURE: 109 MMHG | TEMPERATURE: 98 F

## 2018-12-28 PROBLEM — I63.9 CEREBRAL INFARCTION, UNSPECIFIED: Chronic | Status: ACTIVE | Noted: 2018-12-26

## 2018-12-28 LAB
HCT VFR BLD CALC: 37.4 % — SIGNIFICANT CHANGE UP (ref 34.5–45)
HGB BLD-MCNC: 12 G/DL — SIGNIFICANT CHANGE UP (ref 11.5–15.5)
MCHC RBC-ENTMCNC: 27.5 PG — SIGNIFICANT CHANGE UP (ref 27–34)
MCHC RBC-ENTMCNC: 32.1 G/DL — SIGNIFICANT CHANGE UP (ref 32–36)
MCV RBC AUTO: 85.6 FL — SIGNIFICANT CHANGE UP (ref 80–100)
PLATELET # BLD AUTO: 238 K/UL — SIGNIFICANT CHANGE UP (ref 150–400)
RBC # BLD: 4.37 M/UL — SIGNIFICANT CHANGE UP (ref 3.8–5.2)
RBC # FLD: 14 % — SIGNIFICANT CHANGE UP (ref 10.3–16.9)
WBC # BLD: 4.4 K/UL — SIGNIFICANT CHANGE UP (ref 3.8–10.5)
WBC # FLD AUTO: 4.4 K/UL — SIGNIFICANT CHANGE UP (ref 3.8–10.5)

## 2018-12-28 PROCEDURE — 85652 RBC SED RATE AUTOMATED: CPT

## 2018-12-28 PROCEDURE — 85027 COMPLETE CBC AUTOMATED: CPT

## 2018-12-28 PROCEDURE — 99285 EMERGENCY DEPT VISIT HI MDM: CPT | Mod: 25

## 2018-12-28 PROCEDURE — 85025 COMPLETE CBC W/AUTO DIFF WBC: CPT

## 2018-12-28 PROCEDURE — 86140 C-REACTIVE PROTEIN: CPT

## 2018-12-28 PROCEDURE — 96374 THER/PROPH/DIAG INJ IV PUSH: CPT

## 2018-12-28 PROCEDURE — 85730 THROMBOPLASTIN TIME PARTIAL: CPT

## 2018-12-28 PROCEDURE — 87040 BLOOD CULTURE FOR BACTERIA: CPT

## 2018-12-28 PROCEDURE — 80053 COMPREHEN METABOLIC PANEL: CPT

## 2018-12-28 PROCEDURE — 83735 ASSAY OF MAGNESIUM: CPT

## 2018-12-28 PROCEDURE — 97161 PT EVAL LOW COMPLEX 20 MIN: CPT

## 2018-12-28 PROCEDURE — 36415 COLL VENOUS BLD VENIPUNCTURE: CPT

## 2018-12-28 PROCEDURE — 80048 BASIC METABOLIC PNL TOTAL CA: CPT

## 2018-12-28 PROCEDURE — 85610 PROTHROMBIN TIME: CPT

## 2018-12-28 RX ORDER — CLONAZEPAM 1 MG
1 TABLET ORAL ONCE
Qty: 0 | Refills: 0 | Status: DISCONTINUED | OUTPATIENT
Start: 2018-12-28 | End: 2018-12-28

## 2018-12-28 RX ORDER — LINEZOLID 600 MG/300ML
1 INJECTION, SOLUTION INTRAVENOUS
Qty: 20 | Refills: 0 | OUTPATIENT
Start: 2018-12-28 | End: 2019-01-06

## 2018-12-28 RX ADMIN — Medication 250 MILLIGRAM(S): at 11:19

## 2018-12-28 RX ADMIN — Medication 81 MILLIGRAM(S): at 11:28

## 2018-12-28 RX ADMIN — Medication 1 MILLIGRAM(S): at 13:46

## 2018-12-28 RX ADMIN — Medication 1 APPLICATION(S): at 07:15

## 2018-12-28 NOTE — DISCHARGE NOTE ADULT - CARE PROVIDER_API CALL
Hoa Youssef), Critical Care Medicine; Internal Medicine  [address], [place] [address]  Phone: [phone]  Fax: [phone]

## 2018-12-28 NOTE — DISCHARGE NOTE ADULT - ADDITIONAL INSTRUCTIONS
Upon discharge please start taking linezolid 600mg every 12 hours for 10 days.   Please follow up with your primary physician within 5-7 days of begin discharged from hospital. Upon discharge please start taking linezolid 600mg every 12 hours for 10 days.   Please follow up with your appointment with Dr. Youssef (Internal Medicine) on 1/4/2019 at 2:40pm. Upon discharge please start taking linezolid 600mg every 12 hours for 10 days.   Dr Banks  1/4/2019

## 2018-12-28 NOTE — DISCHARGE NOTE ADULT - PLAN OF CARE
Please follow up with your PCP You were admitted into the hospital because your previous outpatient treatment for cellulitis did not improve on keflex and bactrim. Your right lower extremity cellulitis improved on IV vancomycin. You will be discharged on linezolid 600mg q 12 hours for 10 days. You were admitted into the hospital because your previous outpatient treatment for cellulitis did not improve on keflex and bactrim. Your right lower extremity cellulitis improved on IV vancomycin. You will be discharged on linezolid 600mg q 12 hours for 10 days. You will follow up with Dr. Youssef on 1/4/2019 at 2:40pm. Please take your aspirin, plavix and lipitor daily.

## 2018-12-28 NOTE — DISCHARGE NOTE ADULT - PATIENT PORTAL LINK FT
You can access the [facility] Patient Portal, offered by [facility], by registering with the following website: [email or web address]

## 2018-12-28 NOTE — DISCHARGE NOTE ADULT - CARE PLAN
Principal Discharge DX:	Cellulitis of right lower extremity  Goal:	Please follow up with your PCP  Assessment and plan of treatment:	You were admitted into the hospital because your previous outpatient treatment for cellulitis did not improve on keflex and bactrim. Your right lower extremity cellulitis improved on IV vancomycin. You will be discharged on linezolid 600mg q 12 hours for 10 days. Principal Discharge DX:	Cellulitis of right lower extremity  Goal:	Please follow up with your PCP  Assessment and plan of treatment:	You were admitted into the hospital because your previous outpatient treatment for cellulitis did not improve on keflex and bactrim. Your right lower extremity cellulitis improved on IV vancomycin. You will be discharged on linezolid 600mg q 12 hours for 10 days. You will follow up with Dr. Youssef on 1/4/2019 at 2:40pm.  Secondary Diagnosis:	Cerebrovascular accident (CVA), unspecified mechanism  Assessment and plan of treatment:	Please take your aspirin, plavix and lipitor daily.

## 2018-12-28 NOTE — DISCHARGE NOTE ADULT - HOSPITAL COURSE
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 States she presented to on 12/21 for the same complaints and was diagnosed with RLE cellulitis and discharged with Keflex and Bactrim. Her redness and swelling improved in the beginning, but worsened over the past 2-3 days and she presented to the ED. She was admitted into the hospital and treated with vancomycin 1g q 24hours IV. her right lower extremity cellulitis improved. She was deemed hemodynamically stable and will be discharged on linezolid 600mg q 12 hours for 10 days. 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 States she presented to on 12/21 for the same complaints and was diagnosed with RLE cellulitis and discharged with Keflex and Bactrim. Her redness and swelling improved in the beginning, but worsened over the past 2-3 days and she presented to the ED. She was admitted into the hospital and treated with vancomycin 1g q 24hours IV. her right lower extremity cellulitis improved. She was deemed hemodynamically stable and will be discharged on linezolid 600mg q 12 hours for 10 days.

## 2018-12-28 NOTE — DISCHARGE NOTE ADULT - MEDICATION SUMMARY - MEDICATIONS TO TAKE
I will START or STAY ON the medications listed below when I get home from the hospital:    aspirin 81 mg oral delayed release tablet  -- 1 tab(s) by mouth once a day MDD:1  -- Indication: For Cerebrovascular accident (CVA), unspecified mechanism    Lipitor 40 mg oral tablet  -- 1 tab(s) by mouth once a day (at bedtime) MDD:1  -- Indication: For Cholesterol    clopidogrel 75 mg oral tablet  -- 1 tab(s) by mouth once a day MDD:1  -- Indication: For Cerebrovascular accident (CVA), unspecified mechanism    linezolid 600 mg oral tablet  -- 1 tab(s) by mouth every 12 hours   -- Avoid chocolate, wine and cheese while taking this medication.  Finish all this medication unless otherwise directed by prescriber.  Obtain medical advice before taking any non-prescription drugs as some may affect the action of this medication.    -- Indication: For Cellulitis of right lower extremity

## 2018-12-31 DIAGNOSIS — M21.612 BUNION OF LEFT FOOT: ICD-10-CM

## 2018-12-31 DIAGNOSIS — G47.9 SLEEP DISORDER, UNSPECIFIED: ICD-10-CM

## 2018-12-31 DIAGNOSIS — L03.115 CELLULITIS OF RIGHT LOWER LIMB: ICD-10-CM

## 2018-12-31 DIAGNOSIS — L40.9 PSORIASIS, UNSPECIFIED: ICD-10-CM

## 2018-12-31 DIAGNOSIS — Z86.73 PERSONAL HISTORY OF TRANSIENT ISCHEMIC ATTACK (TIA), AND CEREBRAL INFARCTION WITHOUT RESIDUAL DEFICITS: ICD-10-CM

## 2018-12-31 DIAGNOSIS — E87.1 HYPO-OSMOLALITY AND HYPONATREMIA: ICD-10-CM

## 2018-12-31 DIAGNOSIS — R06.00 DYSPNEA, UNSPECIFIED: ICD-10-CM

## 2018-12-31 DIAGNOSIS — Z96.653 PRESENCE OF ARTIFICIAL KNEE JOINT, BILATERAL: ICD-10-CM

## 2018-12-31 DIAGNOSIS — M19.90 UNSPECIFIED OSTEOARTHRITIS, UNSPECIFIED SITE: ICD-10-CM

## 2018-12-31 DIAGNOSIS — Z79.82 LONG TERM (CURRENT) USE OF ASPIRIN: ICD-10-CM

## 2018-12-31 DIAGNOSIS — H91.90 UNSPECIFIED HEARING LOSS, UNSPECIFIED EAR: ICD-10-CM

## 2018-12-31 LAB
CULTURE RESULTS: SIGNIFICANT CHANGE UP
SPECIMEN SOURCE: SIGNIFICANT CHANGE UP

## 2019-01-03 ENCOUNTER — FORM ENCOUNTER (OUTPATIENT)
Age: 84
End: 2019-01-03

## 2019-01-04 ENCOUNTER — OUTPATIENT (OUTPATIENT)
Dept: OUTPATIENT SERVICES | Facility: HOSPITAL | Age: 84
LOS: 1 days | End: 2019-01-04
Payer: MEDICARE

## 2019-01-04 ENCOUNTER — APPOINTMENT (OUTPATIENT)
Dept: ULTRASOUND IMAGING | Facility: HOSPITAL | Age: 84
End: 2019-01-04

## 2019-01-04 ENCOUNTER — APPOINTMENT (OUTPATIENT)
Dept: INTERNAL MEDICINE | Facility: CLINIC | Age: 84
End: 2019-01-04
Payer: MEDICARE

## 2019-01-04 VITALS
HEART RATE: 99 BPM | WEIGHT: 152 LBS | BODY MASS INDEX: 25.33 KG/M2 | SYSTOLIC BLOOD PRESSURE: 175 MMHG | DIASTOLIC BLOOD PRESSURE: 78 MMHG | OXYGEN SATURATION: 97 % | HEIGHT: 65 IN | TEMPERATURE: 97.8 F

## 2019-01-04 DIAGNOSIS — S72.92XA UNSPECIFIED FRACTURE OF LEFT FEMUR, INITIAL ENCOUNTER FOR CLOSED FRACTURE: Chronic | ICD-10-CM

## 2019-01-04 DIAGNOSIS — Z00.00 ENCOUNTER FOR GENERAL ADULT MEDICAL EXAMINATION W/OUT ABNORMAL FINDINGS: ICD-10-CM

## 2019-01-04 DIAGNOSIS — Z86.19 PERSONAL HISTORY OF OTHER INFECTIOUS AND PARASITIC DISEASES: ICD-10-CM

## 2019-01-04 DIAGNOSIS — Z96.653 PRESENCE OF ARTIFICIAL KNEE JOINT, BILATERAL: Chronic | ICD-10-CM

## 2019-01-04 PROCEDURE — 36415 COLL VENOUS BLD VENIPUNCTURE: CPT

## 2019-01-04 PROCEDURE — G0439: CPT

## 2019-01-04 PROCEDURE — 93925 LOWER EXTREMITY STUDY: CPT

## 2019-01-04 PROCEDURE — 93925 LOWER EXTREMITY STUDY: CPT | Mod: 26

## 2019-01-04 PROCEDURE — 93970 EXTREMITY STUDY: CPT

## 2019-01-04 PROCEDURE — 93970 EXTREMITY STUDY: CPT | Mod: 26

## 2019-01-04 NOTE — ASSESSMENT
[FreeTextEntry1] : Patient with persistent cellulitis  and my concern is blood flow is decreased to lower extremities and that may be why the cellulitis is not improving. will get arterial and venous Dopplers of the lower extremities. and may need vascular involvement\par To repeat lab work today,

## 2019-01-04 NOTE — HISTORY OF PRESENT ILLNESS
[FreeTextEntry1] : Recent admission for Cellulins Notes read from the hospital; ;  [de-identified] : As Above; Cellulitis resolving;  Got 10 days of Linezolid ; 2 days left; Right leg swollen ; Also recent CVA; ; Off Plavix;

## 2019-01-04 NOTE — PHYSICAL EXAM
[No Acute Distress] : no acute distress [Well Nourished] : well nourished [Well Developed] : well developed [Well-Appearing] : well-appearing [Normal Sclera/Conjunctiva] : normal sclera/conjunctiva [PERRL] : pupils equal round and reactive to light [EOMI] : extraocular movements intact [Normal Outer Ear/Nose] : the outer ears and nose were normal in appearance [Normal Oropharynx] : the oropharynx was normal [No JVD] : no jugular venous distention [Supple] : supple [No Lymphadenopathy] : no lymphadenopathy [Thyroid Normal, No Nodules] : the thyroid was normal and there were no nodules present [No Respiratory Distress] : no respiratory distress  [Clear to Auscultation] : lungs were clear to auscultation bilaterally [No Accessory Muscle Use] : no accessory muscle use [Normal Rate] : normal rate  [Regular Rhythm] : with a regular rhythm [Normal S1, S2] : normal S1 and S2 [No Murmur] : no murmur heard [No Carotid Bruits] : no carotid bruits [No Abdominal Bruit] : a ~M bruit was not heard ~T in the abdomen [Pedal Pulses Present] : the pedal pulses are present [No Edema] : there was no peripheral edema [Soft] : abdomen soft [Non Tender] : non-tender [Non-distended] : non-distended [No Masses] : no abdominal mass palpated [No HSM] : no HSM [Normal Bowel Sounds] : normal bowel sounds [Normal Posterior Cervical Nodes] : no posterior cervical lymphadenopathy [Normal Anterior Cervical Nodes] : no anterior cervical lymphadenopathy [No CVA Tenderness] : no CVA  tenderness [No Spinal Tenderness] : no spinal tenderness [No Joint Swelling] : no joint swelling [Grossly Normal Strength/Tone] : grossly normal strength/tone [No Rash] : no rash [Normal Gait] : normal gait [Coordination Grossly Intact] : coordination grossly intact [No Focal Deficits] : no focal deficits [Deep Tendon Reflexes (DTR)] : deep tendon reflexes were 2+ and symmetric [Normal Affect] : the affect was normal [Normal Insight/Judgement] : insight and judgment were intact [de-identified] : Pulses not palapale in lower extremioties;  [de-identified] : Cellulitis lower extremity; Right leg larger than left

## 2019-01-04 NOTE — HEALTH RISK ASSESSMENT
[No falls in past year] : Patient reported no falls in the past year [Patient declined mammogram] : Patient declined mammogram [Patient declined PAP Smear] : Patient declined PAP Smear [Patient declined bone density test] : Patient declined bone density test [Patient declined colonoscopy] : Patient declined colonoscopy [] : No

## 2019-01-08 LAB
ALBUMIN SERPL ELPH-MCNC: 4 G/DL
ALP BLD-CCNC: 83 U/L
ALT SERPL-CCNC: 20 U/L
ANION GAP SERPL CALC-SCNC: 13 MMOL/L
AST SERPL-CCNC: 18 U/L
BASOPHILS # BLD AUTO: 0.04 K/UL
BASOPHILS NFR BLD AUTO: 0.6 %
BILIRUB SERPL-MCNC: 0.3 MG/DL
BUN SERPL-MCNC: 23 MG/DL
CALCIUM SERPL-MCNC: 9.4 MG/DL
CHLORIDE SERPL-SCNC: 105 MMOL/L
CO2 SERPL-SCNC: 19 MMOL/L
CREAT SERPL-MCNC: 0.79 MG/DL
EOSINOPHIL # BLD AUTO: 0.16 K/UL
EOSINOPHIL NFR BLD AUTO: 2.6 %
GLUCOSE SERPL-MCNC: 132 MG/DL
HCT VFR BLD CALC: 40.4 %
HGB BLD-MCNC: 12.6 G/DL
IMM GRANULOCYTES NFR BLD AUTO: 0.2 %
LYMPHOCYTES # BLD AUTO: 1.13 K/UL
LYMPHOCYTES NFR BLD AUTO: 18.2 %
MAN DIFF?: NORMAL
MCHC RBC-ENTMCNC: 28.1 PG
MCHC RBC-ENTMCNC: 31.2 GM/DL
MCV RBC AUTO: 90 FL
MONOCYTES # BLD AUTO: 0.43 K/UL
MONOCYTES NFR BLD AUTO: 6.9 %
NEUTROPHILS # BLD AUTO: 4.45 K/UL
NEUTROPHILS NFR BLD AUTO: 71.5 %
PLATELET # BLD AUTO: 299 K/UL
POTASSIUM SERPL-SCNC: 4.3 MMOL/L
PROT SERPL-MCNC: 6.8 G/DL
RBC # BLD: 4.49 M/UL
RBC # FLD: 14.3 %
SODIUM SERPL-SCNC: 137 MMOL/L
WBC # FLD AUTO: 6.22 K/UL

## 2019-01-28 ENCOUNTER — APPOINTMENT (OUTPATIENT)
Dept: INTERNAL MEDICINE | Facility: CLINIC | Age: 84
End: 2019-01-28
Payer: MEDICARE

## 2019-01-28 VITALS
BODY MASS INDEX: 25.33 KG/M2 | DIASTOLIC BLOOD PRESSURE: 90 MMHG | OXYGEN SATURATION: 98 % | WEIGHT: 152 LBS | HEIGHT: 65 IN | TEMPERATURE: 97.8 F | SYSTOLIC BLOOD PRESSURE: 171 MMHG | HEART RATE: 66 BPM

## 2019-01-28 PROCEDURE — 99213 OFFICE O/P EST LOW 20 MIN: CPT

## 2019-01-28 NOTE — PHYSICAL EXAM

## 2019-02-08 ENCOUNTER — APPOINTMENT (OUTPATIENT)
Dept: INTERNAL MEDICINE | Facility: CLINIC | Age: 84
End: 2019-02-08
Payer: MEDICARE

## 2019-02-08 VITALS
DIASTOLIC BLOOD PRESSURE: 76 MMHG | HEART RATE: 77 BPM | OXYGEN SATURATION: 98 % | SYSTOLIC BLOOD PRESSURE: 150 MMHG | WEIGHT: 152 LBS | TEMPERATURE: 97.9 F | HEIGHT: 65 IN | BODY MASS INDEX: 25.33 KG/M2

## 2019-02-08 PROCEDURE — 99213 OFFICE O/P EST LOW 20 MIN: CPT

## 2019-02-08 NOTE — PHYSICAL EXAM
[Well Nourished] : well nourished [Well Developed] : well developed [Well-Appearing] : well-appearing [Normal Sclera/Conjunctiva] : normal sclera/conjunctiva [EOMI] : extraocular movements intact [Normal Outer Ear/Nose] : the outer ears and nose were normal in appearance [Normal TMs] : both tympanic membranes were normal [Normal Nasal Mucosa] : the nasal mucosa was normal [No JVD] : no jugular venous distention [Supple] : supple [No Respiratory Distress] : no respiratory distress  [Clear to Auscultation] : lungs were clear to auscultation bilaterally [No Accessory Muscle Use] : no accessory muscle use [Normal Rate] : normal rate  [Regular Rhythm] : with a regular rhythm [Normal S1, S2] : normal S1 and S2 [Soft] : abdomen soft [Non Tender] : non-tender [Non-distended] : non-distended [de-identified] : Balance off;  [de-identified] : rash  on right leg improved;

## 2019-02-08 NOTE — HISTORY OF PRESENT ILLNESS
[FreeTextEntry1] : Has not seen the neurologist as of yet;  Feels not improving;  Sleeping and pains;  [de-identified] : A above; Multiple complaints arthritic in nature;  Uses stationary bike at home;  Shortness of breath with effort;  Getting physical therapy at home;  Walks outside;

## 2019-02-08 NOTE — ASSESSMENT
[FreeTextEntry1] : Multiple complaints arthritic in nature;  Will try Celebrex for the pain; Worried about falling;

## 2019-03-13 ENCOUNTER — APPOINTMENT (OUTPATIENT)
Dept: INTERNAL MEDICINE | Facility: CLINIC | Age: 84
End: 2019-03-13

## 2019-03-22 ENCOUNTER — APPOINTMENT (OUTPATIENT)
Dept: NEUROLOGY | Facility: CLINIC | Age: 84
End: 2019-03-22

## 2019-03-25 NOTE — PATIENT PROFILE ADULT. - VISION (WITH CORRECTIVE LENSES IF THE PATIENT USUALLY WEARS THEM):
How Severe Is Your Skin Lesion?: mild Have Your Skin Lesions Been Treated?: not been treated Is This A New Presentation, Or A Follow-Up?: Skin Lesions Partially impaired: cannot see medication labels or newsprint, but can see obstacles in path, and the surrounding layout; can count fingers at arm's length/wears reading glasses

## 2019-05-14 NOTE — STROKE CODE NOTE - NIH STROKE SCALE: 2. BEST GAZE, QM
After speaking with Dr Ontiveros I called and spoke with patient to discuss dental clearance. Dr Ontiveros is ready to proceed with plans for surgery once patient has been cleared by her dentist. Mrs Beauchamp will call us back to schedule an office visit with Dr Ontiveros after she has seen her dentist which she plans to do in the next week or so.  
(0) Normal

## 2019-09-09 ENCOUNTER — RX RENEWAL (OUTPATIENT)
Age: 84
End: 2019-09-09

## 2019-12-19 NOTE — ED PROVIDER NOTE - OBJECTIVE STATEMENT
Pt w/ PMHx HLD p/w RLE redness and pain. Pt reports she was treated for cellulitis 4 months ago w/ improvement. Pt reports redness and swelling began again approx 5-6 days ago. Pt saw PCP and put betadine on it and advised her she did not need abx. Pt returns for worsening sx. No f/c. No CP, SOB. No hx PE / DVT. Skin Substitute Text: The defect edges were debeveled with a #15 scalpel blade.  Given the location of the defect, shape of the defect and the proximity to free margins a skin substitute graft was deemed most appropriate.  The graft material was trimmed to fit the size of the defect. The graft was then placed in the primary defect and oriented appropriately.

## 2020-09-04 NOTE — PATIENT PROFILE ADULT. - BRADEN SCORE (IF 18 OR LESS ACTIVATE SKIN INJURY RISK INCREASED GUIDELINE), MLM
14 Trilobed Flap Text: The defect edges were debeveled with a #15 scalpel blade.  Given the location of the defect and the proximity to free margins a trilobed flap was deemed most appropriate.  Using a sterile surgical marker, an appropriate trilobed flap drawn around the defect.    The area thus outlined was incised deep to adipose tissue with a #15 scalpel blade.  The skin margins were undermined to an appropriate distance in all directions utilizing iris scissors.

## 2021-02-05 ENCOUNTER — RESULT REVIEW (OUTPATIENT)
Age: 86
End: 2021-02-05

## 2021-02-05 ENCOUNTER — APPOINTMENT (OUTPATIENT)
Dept: GERIATRICS | Facility: CLINIC | Age: 86
End: 2021-02-05
Payer: MEDICARE

## 2021-02-05 VITALS
OXYGEN SATURATION: 96 % | BODY MASS INDEX: 26.79 KG/M2 | SYSTOLIC BLOOD PRESSURE: 150 MMHG | DIASTOLIC BLOOD PRESSURE: 70 MMHG | TEMPERATURE: 97.9 F | HEART RATE: 90 BPM | WEIGHT: 161 LBS

## 2021-02-05 DIAGNOSIS — H40.9 UNSPECIFIED GLAUCOMA: ICD-10-CM

## 2021-02-05 DIAGNOSIS — Z60.2 PROBLEMS RELATED TO LIVING ALONE: ICD-10-CM

## 2021-02-05 PROCEDURE — 99205 OFFICE O/P NEW HI 60 MIN: CPT

## 2021-02-05 SDOH — SOCIAL STABILITY - SOCIAL INSECURITY: PROBLEMS RELATED TO LIVING ALONE: Z60.2

## 2021-02-05 NOTE — ASSESSMENT
[FreeTextEntry1] : new patient to [facility] \par completed 3122 today to assist in her move to [place]\par \par will check basic labs\par look for reversible causes of memory loss/anxiety\par no recent follow up with PCP\par \par anxiety\par had been started on prednisone for joint pain\par could be inciting factor\par long term goal to start paxil and attempt tapering clonazepam slowly\par \par insomnia\par amien chroncially 1-2 tabs depending\par but uses every night\par \par in future consider memory testing\par \par she did not bring her eye drops\par med list to be updated next visit

## 2021-02-05 NOTE — PHYSICAL EXAM
[General Appearance - Alert] : alert [General Appearance - In No Acute Distress] : in no acute distress [General Appearance - Well Nourished] : well nourished [Normal Oral Mucosa] : normal oral mucosa [No Oral Pallor] : no oral pallor [Outer Ear] : the ears and nose were normal in appearance [Neck Appearance] : the appearance of the neck was normal [Neck Cervical Mass (___cm)] : no neck mass was observed [Jugular Venous Distention Increased] : there was no jugular-venous distention [Respiration, Rhythm And Depth] : normal respiratory rhythm and effort [Exaggerated Use Of Accessory Muscles For Inspiration] : no accessory muscle use [Auscultation Breath Sounds / Voice Sounds] : lungs were clear to auscultation bilaterally [Heart Rate And Rhythm] : heart rate was normal and rhythm regular [Heart Sounds Gallop] : no gallops [Heart Sounds Pericardial Friction Rub] : no pericardial rub [Bowel Sounds] : normal bowel sounds [Abdomen Soft] : soft [Abdomen Tenderness] : non-tender [Cervical Lymph Nodes Enlarged Posterior Bilaterally] : posterior cervical [Cervical Lymph Nodes Enlarged Anterior Bilaterally] : anterior cervical [No CVA Tenderness] : no ~M costovertebral angle tenderness [FreeTextEntry1] : walker, notably gait dysfunction [Skin Color & Pigmentation] : normal skin color and pigmentation [Skin Turgor] : normal skin turgor [] : no rash

## 2021-02-05 NOTE — SOCIAL HISTORY
[No falls in past year] : Patient reported no falls in the past year [Fully functional (bathing, dressing, toileting, transferring, walking, feeding)] : Fully functional (bathing, dressing, toileting, transferring, walking, feeding) [Fully functional (using the telephone, shopping, preparing meals, housekeeping, doing laundry, using transportation,] : Fully functional and needs no help or supervision to perform IADLs (using the telephone, shopping, preparing meals, housekeeping, doing laundry, using transportation, managing medications and managing finances) [Walker] : walker [Smoke Detector] : smoke detector [Carbon Monoxide Detector] : carbon monoxide detector [Guns at Home] : guns at home [Safety elements used in home] : safety elements used in home [Grab Bars] : grab bars [Shower Chair] : shower chair [Night Light] : night light [Anti-Slip Measures] : anti-slip measures [Driving] : driving

## 2021-02-05 NOTE — HISTORY OF PRESENT ILLNESS
[1] : 2) Feeling down, depressed, or hopeless for several days [PHQ-2 Score ___] : PHQ-2 Score [unfilled] [FreeTextEntry1] : New to [facility]\par Had been living in an apartment in [place] with extensive assistance from a house keeper\par unable to maintain herself in independent living\par now to move to assisted living\par \par was in [facility]\par possibly something on CT chest was advised to be on NOAC x 3 months \par she refused, understands risks of not being on AC\par \par graduated from [employer] [facility] [employer] at age 21 - very forward for her generation\par \par tells me she has had more body pains\par hx L knee replacement Dec 2017\par has had issues walking\par now using walker\par was started on celebrex and prednisone by prior PCP Dr. Kiko Flower ([facility])\par unclear diagnosis\par \par has glaucoma requiring surgery ?trabeculectomy\par now on drops\par \par lost her son to pancreatic cancer recently\par has had anxiety since he became more ill\par now on clonazpeam\par \par chronic insomnia using ambien \par \par Prior PCP Dr. Real\par

## 2021-02-05 NOTE — REVIEW OF SYSTEMS
[Fever] : no fever [Chills] : no chills [Feeling Tired] : feeling tired [Eyesight Problems] : eyesight problems [Discharge From Eyes] : no purulent discharge from the eyes [Nosebleeds] : no nosebleeds [Nasal Discharge] : no nasal discharge [Chest Pain] : no chest pain [Palpitations] : no palpitations [Cough] : no cough [SOB on Exertion] : no shortness of breath during exertion [Abdominal Pain] : no abdominal pain [Vomiting] : no vomiting [Incontinence] : incontinence [Arthralgias] : arthralgias [Joint Pain] : joint pain [Skin Lesions] : no skin lesions [Skin Wound] : no skin wound [Dizziness] : dizziness [Fainting] : no fainting [Anxiety] : anxiety

## 2021-02-10 ENCOUNTER — APPOINTMENT (OUTPATIENT)
Dept: GERIATRICS | Facility: CLINIC | Age: 86
End: 2021-02-10

## 2021-02-12 ENCOUNTER — APPOINTMENT (OUTPATIENT)
Dept: GERIATRICS | Facility: ASSISTED LIVING FACILITY | Age: 86
End: 2021-02-12
Payer: MEDICARE

## 2021-02-16 ENCOUNTER — NON-APPOINTMENT (OUTPATIENT)
Age: 86
End: 2021-02-16

## 2021-02-16 VITALS
DIASTOLIC BLOOD PRESSURE: 55 MMHG | HEART RATE: 92 BPM | RESPIRATION RATE: 20 BRPM | SYSTOLIC BLOOD PRESSURE: 118 MMHG | TEMPERATURE: 97.4 F

## 2021-02-16 NOTE — REVIEW OF SYSTEMS
[Feeling Tired] : feeling tired [Eyesight Problems] : eyesight problems [Incontinence] : incontinence [Arthralgias] : arthralgias [Joint Pain] : joint pain [Dizziness] : dizziness [Anxiety] : anxiety [Fever] : no fever [Chills] : no chills [Discharge From Eyes] : no purulent discharge from the eyes [Nosebleeds] : no nosebleeds [Nasal Discharge] : no nasal discharge [Chest Pain] : no chest pain [Palpitations] : no palpitations [Cough] : no cough [SOB on Exertion] : no shortness of breath during exertion [Abdominal Pain] : no abdominal pain [Vomiting] : no vomiting [Skin Lesions] : no skin lesions [Skin Wound] : no skin wound [Fainting] : no fainting

## 2021-02-16 NOTE — PHYSICAL EXAM
[General Appearance - Alert] : alert [General Appearance - In No Acute Distress] : in no acute distress [General Appearance - Well Nourished] : well nourished [Normal Oral Mucosa] : normal oral mucosa [No Oral Pallor] : no oral pallor [Outer Ear] : the ears and nose were normal in appearance [Neck Appearance] : the appearance of the neck was normal [Neck Cervical Mass (___cm)] : no neck mass was observed [Jugular Venous Distention Increased] : there was no jugular-venous distention [Respiration, Rhythm And Depth] : normal respiratory rhythm and effort [Exaggerated Use Of Accessory Muscles For Inspiration] : no accessory muscle use [Auscultation Breath Sounds / Voice Sounds] : lungs were clear to auscultation bilaterally [Heart Rate And Rhythm] : heart rate was normal and rhythm regular [Heart Sounds Gallop] : no gallops [Heart Sounds Pericardial Friction Rub] : no pericardial rub [Bowel Sounds] : normal bowel sounds [Abdomen Soft] : soft [Abdomen Tenderness] : non-tender [Cervical Lymph Nodes Enlarged Posterior Bilaterally] : posterior cervical [Cervical Lymph Nodes Enlarged Anterior Bilaterally] : anterior cervical [No CVA Tenderness] : no ~M costovertebral angle tenderness [Skin Color & Pigmentation] : normal skin color and pigmentation [Skin Turgor] : normal skin turgor [] : no rash [FreeTextEntry1] : walker, notably gait dysfunction

## 2021-02-16 NOTE — HISTORY OF PRESENT ILLNESS
[0] : 2) Feeling down, depressed, or hopeless: Not at all [PHQ-2 Score ___] : PHQ-2 Score [unfilled] [FreeTextEntry1] : New to [facility]\par Had been living in an apartment in [place] with extensive assistance from a house keeper\par unable to maintain herself in independent living\par has now moved to assisted living\par \par was in [facility]\par possibly something on CT chest was advised to be on NOAC x 3 months \par she refused, understands risks of not being on AC\par \par graduated from [employer] [facility] [employer] at age 21 - very forward for her generation\par \par Propr PCP Dr. Kiko Flower ([facility])\par I reviewed her labs today\par overt DM - possibly due to prednisone\par son passed recently from pancreatic cancer as well\par \par first step is to lower prednisone to 2 tabs or 5mg daily in AM and taper Q2 weeks\par no RA on labs\par \par lost son as discussed above\par has had anxiety since he became more ill\par now on clonazpeam\par \par chronic insomnia using ambien \par \par Prior PCP Dr. Real\par

## 2021-02-16 NOTE — ASSESSMENT
[FreeTextEntry1] : new patient to [facility] \par \par \par labs were a bit alarming\par \par will write for PRN tylenol\par confirmed she is a DNR and signed order today\par \par lower prednisone to 5mg daily\par and continue to taper as possible\par repeat A1C\par also famiily hx pancreatic cancer\par obtain last sugar testing from both prir doctors\par treat UTI\par \par consider B12 treatment if no improvement in memmeory with UTI treatment\par and off of steroids\par \par anxiety\par might improve on less prednisone as well\par long term goal to start paxil and attempt tapering clonazepam slowly\par \par insomnia\par amien chroncially 1-2 tabs depending\par but uses every night\par \par in future consider memory testing\par \par she did not bring her eye drops\par med list to be updated next visit

## 2021-02-17 ENCOUNTER — APPOINTMENT (OUTPATIENT)
Dept: GERIATRICS | Facility: CLINIC | Age: 86
End: 2021-02-17
Payer: MEDICARE

## 2021-02-17 VITALS
TEMPERATURE: 97.4 F | RESPIRATION RATE: 20 BRPM | HEART RATE: 81 BPM | SYSTOLIC BLOOD PRESSURE: 131 MMHG | DIASTOLIC BLOOD PRESSURE: 66 MMHG

## 2021-02-17 NOTE — ASSESSMENT
[FreeTextEntry1] : new patient to [facility] \par \par 2 recent ER visits \par fall with head strike\par anxiety - panic attacks\par \par agreeable to work with psychologist\par will also start paxil 10mg daily on trial basis in AM\par wanting to see neurology to discuss falls and her memory changes\par also will start B12\par \par in future to discuss metformin\par only will start after tapering prednisone compeltely\par \par \par will repeat A1C in future\par also famiily hx pancreatic cancer\par obtain last sugar testing from both prir doctors\par \par insomnia\par ambien chronically 1-2 tabs depending\par but uses every night\par \par in future consider memory testing\par \par visit done in home as she has advancing dementia and hx recent falls\par also on quarantine post ER so cannot leave apartment\par home bound

## 2021-02-17 NOTE — HISTORY OF PRESENT ILLNESS
[FreeTextEntry1] : New to [facility]\par Had been living in an apartment in [place] with extensive assistance from a house keeper\par unable to maintain herself in independent living\par has now moved to assisted living\par \par Pripr PCP Dr. Kiko Flower ([facility])/Prior PCP Dr. Real\par \par 2 ER visits over the weekend with fall and headstrike\par CT scan negatiev for head bleed but showed chronic changes and old infarcts\par \par recent labs showed overt DM - possibly due to prednisone\par son passed recently from pancreatic cancer as well\par \par lost son as discussed above\par has had anxiety since he became more ill\par now on clonazpeam\par \par chronic insomnia using ambien \par \par \par  [1] : 2) Feeling down, depressed, or hopeless for several days [PHQ-2 Score ___] : PHQ-2 Score [unfilled]

## 2021-02-26 ENCOUNTER — APPOINTMENT (OUTPATIENT)
Dept: GERIATRICS | Facility: CLINIC | Age: 86
End: 2021-02-26

## 2021-03-03 ENCOUNTER — APPOINTMENT (OUTPATIENT)
Dept: GERIATRICS | Facility: CLINIC | Age: 86
End: 2021-03-03
Payer: MEDICARE

## 2021-03-03 VITALS
TEMPERATURE: 97.2 F | DIASTOLIC BLOOD PRESSURE: 64 MMHG | HEART RATE: 81 BPM | SYSTOLIC BLOOD PRESSURE: 151 MMHG | RESPIRATION RATE: 20 BRPM

## 2021-03-03 RX ORDER — SULFAMETHOXAZOLE AND TRIMETHOPRIM 800; 160 MG/1; MG/1
800-160 TABLET ORAL
Qty: 14 | Refills: 0 | Status: COMPLETED | COMMUNITY
Start: 2021-02-11 | End: 2021-03-03

## 2021-03-03 NOTE — HISTORY OF PRESENT ILLNESS
[1] : 2) Feeling down, depressed, or hopeless for several days [PHQ-2 Score ___] : PHQ-2 Score [unfilled] [FreeTextEntry1] : New to [facility]\par Had been living in an apartment in [place] with extensive assistance from a house keeper\par unable to maintain herself in independent living\par has now moved to assisted living\par \par Prior PCP Dr. Kiko Flower ([facility])/Prior PCP Dr. Real\par \par major issues\par on high dose prednisone for arhtirits\par now with DM on labs\par severe anxiety\par diarrhea now on immodium\par \par now on paxil\par anxiety persists, also using PRN clonazepam\par to meet with [facility]\par \par \par \par chronic insomnia using ambien \par \par \par

## 2021-03-03 NOTE — ASSESSMENT
[FreeTextEntry1] : new patient to [facility] \par \par working with psychologist\par increase paxil to 20mg \par continue clonazepam PRN\par think that high dose prednisone coupled with sons death might have been a factor\par \par wanting to see neurology to discuss falls and her memory changes\par \par check BP and HR BID x 14 days \par RTC 14 days to review\par \par in future to discuss metformin\par only will start after tapering prednisone completely\par \par \par will repeat A1C in future\par also family hx pancreatic cancer\par obtain last sugar testing from both prior doctors\par \par insomnia\par ambien chronically 1-2 tabs depending\par but uses every night\par \par in future consider memory testing\par \par visit done in home as she has advancing dementia and hx recent falls\par also on quarantine post ER so cannot leave apartment\par home bound

## 2021-03-05 ENCOUNTER — APPOINTMENT (OUTPATIENT)
Dept: NEUROLOGY | Facility: CLINIC | Age: 86
End: 2021-03-05
Payer: MEDICARE

## 2021-03-05 VITALS
TEMPERATURE: 97.2 F | WEIGHT: 155 LBS | HEIGHT: 65 IN | SYSTOLIC BLOOD PRESSURE: 128 MMHG | DIASTOLIC BLOOD PRESSURE: 74 MMHG | HEART RATE: 96 BPM | BODY MASS INDEX: 25.83 KG/M2

## 2021-03-05 PROCEDURE — 99214 OFFICE O/P EST MOD 30 MIN: CPT

## 2021-03-09 NOTE — DATA REVIEWED
[de-identified] : CT head: 2/14/21:\par Generalized prominence of the ventricles, sulci, and fissures is presumably age-related, and  periventricular white matter lucency is most compatible with chronic small vessel ischemic changes. An unchanged area of right occipital encephalomalacia is \par compatible with an old infarct and there are small bilateral lacunar infarcts.. There is no evidence of acute intracranial hemorrhage or territorial infarct, mass-effect or midline shift. \par \par

## 2021-03-09 NOTE — HISTORY OF PRESENT ILLNESS
[FreeTextEntry1] : Noelle is a pleasant 89-year-old lady, with history of anxiety on klonopin and insomnia, on ambien, resident of [facility] on [address], history of possible PE (unclear, now refusing anticoagulation), presenting with episodes of panic and shaking, rule out seizure.  She had recent ED visit due to falls with head injury. \par Referred to neurology for work up of fall and memory changes.   \par \par Overall, Noelle reports having difficulty adjusting to the new environment of [facility]. She is used to having her own apartment and had an assistant who helped her with most IADLs. 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 Now she finds it difficult to concentrate and she gets frustrated when asked to shift from one task to another. Recently started on Paxil. \par Stressors include losing son to pancreatic cancer \par \par She tries to get enough sleep, sleeps from 9:30-6:30 pm. She relies on zolpidem of sleep  and clonazepam for anxiety . She feels tense and gets angry sometimes. She feels her head "going funny" and did have a fall. She thinks she "went unconscious". She hit her head. No observed tonic clonic activity or history of seizures. \par \par As per ED notes, she had a fall 2/12/21 and was evaluated by ED. She reported feeling more confused with head pain and returned to ED at [place] 2/14/21.  She had a CT head which showed small vessel ischemic changes and an area of right occipital encephalomalacia compatible with old infarct with bilateral lacunar infarcts. No evidence of hemorrhage or territorial infarct. \par \par Current Medications: \par aspirin 81 mg qdaily \par atorvastatin 40 mg qdaily \par clonazepam 0.25 mg on tongue tid prn \par prednisone 5 mg po daily \par bactrim \par zolpidem \par \par \par \par \par \par

## 2021-03-09 NOTE — ASSESSMENT
[FreeTextEntry1] : Recommendation: \par \par routine and ambulatory EEG \par physical therapy for balance \par agree with paxil, decreased reliance on ambien and clonazepam\par would benefit from psychotherapy \par continue aspirin and statin for secondary stroke prevention \par \par consider melatonin or magnesium oxide 100 mg qhs for sleep \par would check b12, tsh, free t4 for reversible causes of dementia \par \par \par Follow-up in two months \par \par

## 2021-03-09 NOTE — HISTORY OF PRESENT ILLNESS
[FreeTextEntry1] : Noelle is a pleasant 89-year-old lady, with history of anxiety on klonopin and insomnia, on ambien, resident of [facility] [address], history of possible PE (unclear, now refusing anticoagulation), presenting with episodes of panic and shaking, rule out seizure.  She had recent ED visit due to falls with head injury. \par Referred to neurology for work up of fall and memory changes.   \par \par Overall, Noelle reports having difficulty adjusting to the new environment of [facility]. She is used to having her own apartment and had an assistant who helped her with most IADLs. 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 Now she finds it difficult to concentrate and she gets frustrated when asked to shift from one task to another. Recently started on Paxil. \par Stressors include losing son to pancreatic cancer \par \par She tries to get enough sleep, sleeps from 9:30-6:30 pm. She relies on zolpidem of sleep  and clonazepam for anxiety . She feels tense and gets angry sometimes. She feels her head "going funny" and did have a fall. She thinks she "went unconscious". She hit her head. No observed tonic clonic activity or history of seizures. \par \par As per ED notes, she had a fall 2/12/21 and was evaluated by ED. She reported feeling more confused with head pain and returned to ED at [place] 2/14/21.  She had a CT head which showed small vessel ischemic changes and an area of right occipital encephalomalacia compatible with old infarct with bilateral lacunar infarcts. No evidence of hemorrhage or territorial infarct. \par \par Current Medications: \par aspirin 81 mg qdaily \par atorvastatin 40 mg qdaily \par clonazepam 0.25 mg on tongue tid prn \par prednisone 5 mg po daily \par bactrim \par zolpidem \par \par \par \par \par \par

## 2021-03-09 NOTE — DATA REVIEWED
[de-identified] : CT head: 2/14/21:\par Generalized prominence of the ventricles, sulci, and fissures is presumably age-related, and  periventricular white matter lucency is most compatible with chronic small vessel ischemic changes. An unchanged area of right occipital encephalomalacia is \par compatible with an old infarct and there are small bilateral lacunar infarcts.. There is no evidence of acute intracranial hemorrhage or territorial infarct, mass-effect or midline shift. \par \par

## 2021-03-09 NOTE — REVIEW OF SYSTEMS
[Memory Lapses or Loss] : memory loss [Decr. Concentrating Ability] : decreased concentrating ability [Frequent Falls] : frequent falls [Anxiety] : anxiety

## 2021-03-09 NOTE — PHYSICAL EXAM
[FreeTextEntry1] : \par Mental Status: AxOx3, speech: no dysarthria, anxious affect, able to name items, able to follow commands across midline, able to calculate number of quarters in $1.50 , able to spell "world" backwards \par STM 3/3 immediate, 323 at five minutes, \par CN: visual acuity, VFF, blink to confrontation \par III, IV, VI, PERRL, EOMI \par V sensation normal to light touch, pinprick\par VII normal squint vs resistance, normal smile, face symmetric \par VIII: decreased hearing to voice \par IX, X normal gag, symmetric palate, uvula raises midline \par XI normal shrug versus resistance and lateralization of head versus resistance \par XII tongue symmetric, normal strength, no tremor fasciculations \par Motor: full strength throughout \par Sensory: normal to LT \par Reflexes:  \par Brachioradialis, biceps, triceps, patella and ankles 1+ \par Plantar flexor response bilaterally \par Coordination: no dysmetria on FNF, \par Gait : cautious, antalgic, unsteady gait \par \par

## 2021-03-09 NOTE — CONSULT LETTER
[Dear  ___] : Dear  [unfilled], [Please see my note below.] : Please see my note below. [Sincerely,] : Sincerely, [FreeTextEntry3] : Alvaro Fletcher MD \par [facility]

## 2021-03-09 NOTE — DISCUSSION/SUMMARY
[FreeTextEntry1] : Noelle is a pleasant 89-year- old lady with history of PE?, anxiety, chronic insomnia, presenting for evaluation of memory changes and a fall with head injury resulting in ED visit. 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 \par \par Unclear etiology of falls. Overall low suspicion of seizures, although right occipital encephalomalacia can serve as nidus for seizure. \par \par Has some difficulties with set shifting, executive dysfunction but overall impression is that she is very anxious and clonazepam and ambien may not be the best medications in the elderly and can cause fall risk. Agree with adding antidepressant and decreasing reliance on ambien and clonazepam. Would benefit from physical therapy. \par

## 2021-03-17 ENCOUNTER — APPOINTMENT (OUTPATIENT)
Dept: GERIATRICS | Facility: CLINIC | Age: 86
End: 2021-03-17
Payer: MEDICARE

## 2021-03-17 VITALS
HEART RATE: 76 BPM | RESPIRATION RATE: 20 BRPM | DIASTOLIC BLOOD PRESSURE: 61 MMHG | SYSTOLIC BLOOD PRESSURE: 121 MMHG | TEMPERATURE: 97 F

## 2021-03-17 NOTE — HISTORY OF PRESENT ILLNESS
Writer spoke with MD regarding elevated blood pressures. MD aware. New orders received of one time dose hydralazine and metoprolol. Will continue to monitor.    [FreeTextEntry1] : New to [facility]\par Had been living in an apartment in [place] with extensive assistance from a house keeper\par unable to maintain herself in independent living\par has now moved to assisted living\par \par Prior PCP Dr. Kiko Flower ([facility])/Prior PCP Dr. Real\par \par major issues\par on high dose prednisone for arthritis\par now with DM on labs\par severe anxiety\par diarrhea now on immodium\par \par now on paxil\par anxiety persists, also using PRN clonazepam\par tapering prednisone and feeling MUCH improved\par \par \par \par chronic insomnia using ambien \par \par \par  [1] : 2) Feeling down, depressed, or hopeless for several days [PHQ-2 Score ___] : PHQ-2 Score [unfilled]

## 2021-03-17 NOTE — ASSESSMENT
[FreeTextEntry1] : new patient to [facility] \par \par continue tapering prednisone\par 2.5 mg every other day x 2 weeks then stop\par will check labs including A1C next week\par RTC 14 days\par \par continue clonazepam PRN\par think that high dose prednisone coupled with sons death might have been a factor in panic attacks\par \par in future to discuss metformin\par only will start after tapering prednisone completely\par \par also family hx pancreatic cancer\par obtain last sugar testing from both prior doctors\par \par insomnia\par ambien chronically\par \par in future consider memory testing\par \par visit done in home as she has advancing dementia and hx recent falls\par also on quarantine post ER so cannot leave apartment\par home bound

## 2021-03-31 ENCOUNTER — APPOINTMENT (OUTPATIENT)
Dept: GERIATRICS | Facility: ASSISTED LIVING FACILITY | Age: 86
End: 2021-03-31
Payer: MEDICARE

## 2021-04-02 VITALS
HEART RATE: 85 BPM | TEMPERATURE: 97.8 F | SYSTOLIC BLOOD PRESSURE: 139 MMHG | DIASTOLIC BLOOD PRESSURE: 80 MMHG | RESPIRATION RATE: 20 BRPM

## 2021-04-02 NOTE — HISTORY OF PRESENT ILLNESS
[1] : 2) Feeling down, depressed, or hopeless for several days [PHQ-2 Score ___] : PHQ-2 Score [unfilled] [FreeTextEntry1] : New to [facility]\par Had been living in an apartment in [place] with extensive assistance from a house keeper\par unable to maintain herself in independent living\par has now moved to assisted living\par \par Prior PCP Dr. Kiko Flower ([facility])/Prior PCP Dr. Real\par \par update\par have tapered prednisone down\par will be off of prednisone by weekend \par recent labs showed UTI\par and A1C is downtrending 9 to now 7\par Vitamin D remains very low and she has started bolus treatment\par using clonazpeam 1-2x a daily but paxil is certainly helping\par still reliant on ambien\par feels tired in AM\par \par \par \par \par \par \par

## 2021-04-02 NOTE — ASSESSMENT
[FreeTextEntry1] : new patient to [facility] \par \par tapering prednisone will be off of prednisone by end of this week\par now arthritic pain control may become more difficult\par will add tylenol 1000mg BID standing\par and switch celebrex with mobic for now\par can use PRN tylenol dosing to max 3g tylenol in 24 hours\par \par trial moving up ambien by 30-60 mins to help with early morning fatigue she feels\par \par lower PRN clonazpeam to BID PRN\par she is using less as paxil is kicking in\par \par repeat A1C in 6-8 weeks\par avoid metformin if possible as A1C is dropping and she has chronic diarrhea\par family hx pancreatic cancer but rapidly dropping a1C is more consistent with prednisone\par \par insomnia\par ambien chronically\par life long med\par may not be able to taper\par will continue to gain her trust\par \par in future consider memory testing\par \par encouraged to followup- with [facility] for [facility] testing\par she was hesitant \par visit done in home as she has advancing dementia and hx recent falls\par memory testing in future\par \par

## 2021-04-02 NOTE — PHYSICAL EXAM
[General Appearance - Alert] : alert [General Appearance - In No Acute Distress] : in no acute distress [General Appearance - Well Nourished] : well nourished [Normal Oral Mucosa] : normal oral mucosa [No Oral Pallor] : no oral pallor [Outer Ear] : the ears and nose were normal in appearance [Neck Appearance] : the appearance of the neck was normal [Neck Cervical Mass (___cm)] : no neck mass was observed [Jugular Venous Distention Increased] : there was no jugular-venous distention [Respiration, Rhythm And Depth] : normal respiratory rhythm and effort [Auscultation Breath Sounds / Voice Sounds] : lungs were clear to auscultation bilaterally [Exaggerated Use Of Accessory Muscles For Inspiration] : no accessory muscle use [Heart Rate And Rhythm] : heart rate was normal and rhythm regular [Heart Sounds Gallop] : no gallops [Heart Sounds Pericardial Friction Rub] : no pericardial rub [Bowel Sounds] : normal bowel sounds [Abdomen Tenderness] : non-tender [Abdomen Soft] : soft [Cervical Lymph Nodes Enlarged Posterior Bilaterally] : posterior cervical [Cervical Lymph Nodes Enlarged Anterior Bilaterally] : anterior cervical [No CVA Tenderness] : no ~M costovertebral angle tenderness [Skin Color & Pigmentation] : normal skin color and pigmentation [Skin Turgor] : normal skin turgor [] : no rash [FreeTextEntry1] : walker, notably gait dysfunction

## 2021-04-13 ENCOUNTER — APPOINTMENT (OUTPATIENT)
Dept: NEUROLOGY | Facility: CLINIC | Age: 86
End: 2021-04-13
Payer: MEDICARE

## 2021-04-13 PROCEDURE — 95816 EEG AWAKE AND DROWSY: CPT

## 2021-04-14 ENCOUNTER — APPOINTMENT (OUTPATIENT)
Dept: GERIATRICS | Facility: ASSISTED LIVING FACILITY | Age: 86
End: 2021-04-14
Payer: MEDICARE

## 2021-04-14 ENCOUNTER — APPOINTMENT (OUTPATIENT)
Dept: NEUROLOGY | Facility: CLINIC | Age: 86
End: 2021-04-14

## 2021-04-14 DIAGNOSIS — Z87.2 PERSONAL HISTORY OF DISEASES OF THE SKIN AND SUBCUTANEOUS TISSUE: ICD-10-CM

## 2021-04-14 PROCEDURE — 95719 EEG PHYS/QHP EA INCR W/O VID: CPT

## 2021-04-14 PROCEDURE — 95700 EEG CONT REC W/VID EEG TECH: CPT

## 2021-04-14 PROCEDURE — 95708 EEG WO VID EA 12-26HR UNMNTR: CPT

## 2021-04-15 VITALS
DIASTOLIC BLOOD PRESSURE: 80 MMHG | HEART RATE: 75 BPM | TEMPERATURE: 97.4 F | RESPIRATION RATE: 20 BRPM | SYSTOLIC BLOOD PRESSURE: 127 MMHG

## 2021-04-15 PROBLEM — Z87.2 HISTORY OF CELLULITIS: Status: RESOLVED | Noted: 2019-01-04 | Resolved: 2021-04-15

## 2021-04-15 RX ORDER — PREDNISONE 2.5 MG/1
2.5 TABLET ORAL DAILY
Qty: 30 | Refills: 0 | Status: COMPLETED | COMMUNITY
Start: 2021-02-05 | End: 2021-04-15

## 2021-04-15 RX ORDER — SULFAMETHOXAZOLE AND TRIMETHOPRIM 800; 160 MG/1; MG/1
800-160 TABLET ORAL
Qty: 14 | Refills: 0 | Status: COMPLETED | COMMUNITY
Start: 2021-03-25 | End: 2021-04-15

## 2021-04-15 NOTE — HISTORY OF PRESENT ILLNESS
[Two or more falls in past year] : Patient reported two or more falls in the past year [Independent] : using telephone [Some assistance needed] : managing finances [Full assistance needed] : preparing meals [Walker] : walker [1] : 2) Feeling down, depressed, or hopeless for several days [PHQ-2 Score ___] : PHQ-2 Score [unfilled] [FreeTextEntry1] : New to [facility]\par Had been living in an apartment in [place] with extensive assistance from a house keeper\par unable to maintain herself in independent living\par has now moved to assisted living\par \par Prior PCP Dr. Kiko Flower ([facility])/Prior PCP Dr. Real\par \par update\par off prednisone \par arthritis pain is worse epsecially in AM\par had been trialing meloxicam with no releif\par also on tylenol BID\par \par barely using clonazepam\par still reliant on ambien\par \par \par \par \par \par \par \par

## 2021-04-15 NOTE — ASSESSMENT
[FreeTextEntry1] : new patient to [facility] \par \par off of prednisone but arthritis pain is worse especially in AM\par increase tylenol 1000mg  TID standing\par dc mobic restart celebrex but space out to 100mg BID (rather than 200mg in AM)\par \par check labs this tuesday\par having early AM chills\par hx UTI\par will order LPN to check HR BP temp in early AM and keep log\par \par PRN clonazepam\par still have chest tightness no wheezing\par may consider increasing paxil despite her age which might help with her severe anxiety/panic \par \par repeat A1C now\par avoid metformin if possible as A1C is dropping and she has chronic diarrhea\par family hx pancreatic cancer but rapidly dropping a1C is more consistent with prednisone\par \par insomnia\par ambien chronically\par life long med\par may not be able to taper\par will continue to gain her trust\par \par in future consider memory testing\par \par seen in assisted living apartment due to frequent falls, decresed mobility high fall risk\par \par

## 2021-04-29 ENCOUNTER — APPOINTMENT (OUTPATIENT)
Dept: GERIATRICS | Facility: CLINIC | Age: 86
End: 2021-04-29
Payer: MEDICARE

## 2021-04-29 DIAGNOSIS — E55.9 VITAMIN D DEFICIENCY, UNSPECIFIED: ICD-10-CM

## 2021-04-30 VITALS
DIASTOLIC BLOOD PRESSURE: 75 MMHG | OXYGEN SATURATION: 96 % | HEART RATE: 84 BPM | SYSTOLIC BLOOD PRESSURE: 137 MMHG | TEMPERATURE: 98 F | RESPIRATION RATE: 20 BRPM

## 2021-04-30 PROBLEM — E55.9 VITAMIN D INSUFFICIENCY: Status: RESOLVED | Noted: 2021-03-24 | Resolved: 2021-04-30

## 2021-04-30 RX ORDER — ATROPINE SULFATE 10 MG/ML
1 SOLUTION OPHTHALMIC
Qty: 2 | Refills: 0 | Status: DISCONTINUED | COMMUNITY
Start: 2020-10-29 | End: 2021-04-30

## 2021-04-30 RX ORDER — MOXIFLOXACIN OPHTHALMIC 5 MG/ML
0.5 SOLUTION/ DROPS OPHTHALMIC
Qty: 3 | Refills: 0 | Status: DISCONTINUED | COMMUNITY
Start: 2020-10-29 | End: 2021-04-30

## 2021-04-30 RX ORDER — ASPIRIN ENTERIC COATED TABLETS 81 MG 81 MG/1
81 TABLET, DELAYED RELEASE ORAL
Refills: 0 | Status: DISCONTINUED | COMMUNITY
End: 2021-04-30

## 2021-04-30 RX ORDER — DIFLUPREDNATE 0.5 MG/ML
0.05 EMULSION OPHTHALMIC
Qty: 5 | Refills: 0 | Status: DISCONTINUED | COMMUNITY
Start: 2021-01-16 | End: 2021-04-30

## 2021-04-30 RX ORDER — BRINZOLAMIDE/BRIMONIDINE TARTRATE 10; 2 MG/ML; MG/ML
1-0.2 SUSPENSION/ DROPS OPHTHALMIC
Qty: 8 | Refills: 0 | Status: DISCONTINUED | COMMUNITY
Start: 2020-10-07 | End: 2021-04-30

## 2021-04-30 RX ORDER — ATORVASTATIN CALCIUM 40 MG/1
40 TABLET, FILM COATED ORAL
Qty: 3 | Refills: 0 | Status: DISCONTINUED | COMMUNITY
Start: 2018-09-19 | End: 2021-04-30

## 2021-04-30 NOTE — HISTORY OF PRESENT ILLNESS
[FreeTextEntry1] : multiple somatic c/o particularly about bilateral \par leg pain in thighs through knees, very chronic and well\par documented - previously was on prednisone for this? \par now taking celebrex and tylenol\par also scheduled for PT; however has canceled twice in\par the past week.\par \par pt seen in her memory care unit due to she is homebound

## 2021-04-30 NOTE — ASSESSMENT
[FreeTextEntry1] : Encouraged pt to continue PT\par d/w PT who will provide warm therapy to legs and back next visit\par Continue pain mgmt medications \par \par F/U with pcp at next visit

## 2021-04-30 NOTE — PHYSICAL EXAM
[General Appearance - Alert] : alert [General Appearance - In No Acute Distress] : in no acute distress [General Appearance - Well Developed] : well developed [General Appearance - Well Nourished] : well nourished [Sclera] : the sclera and conjunctiva were normal [PERRL With Normal Accommodation] : pupils were equal in size, round, and reactive to light [] : no respiratory distress [Auscultation Breath Sounds / Voice Sounds] : lungs were clear to auscultation bilaterally [Respiration, Rhythm And Depth] : normal respiratory rhythm and effort [Apical Impulse] : the apical impulse was normal [Heart Rate And Rhythm] : heart rate was normal and rhythm regular [Abnormal Walk] : normal gait [Musculoskeletal - Swelling] : no joint swelling seen [FreeTextEntry1] : ambulating w walker, transferring independently

## 2021-05-08 ENCOUNTER — NON-APPOINTMENT (OUTPATIENT)
Age: 86
End: 2021-05-08

## 2021-05-12 ENCOUNTER — APPOINTMENT (OUTPATIENT)
Dept: GERIATRICS | Facility: ASSISTED LIVING FACILITY | Age: 86
End: 2021-05-12
Payer: MEDICARE

## 2021-05-17 VITALS
SYSTOLIC BLOOD PRESSURE: 130 MMHG | RESPIRATION RATE: 18 BRPM | OXYGEN SATURATION: 96 % | HEART RATE: 80 BPM | TEMPERATURE: 98 F | DIASTOLIC BLOOD PRESSURE: 80 MMHG

## 2021-05-17 NOTE — HISTORY OF PRESENT ILLNESS
[FreeTextEntry1] : New to [facility]\par Had been living in an apartment in [place] with extensive assistance from a house keeper\par unable to maintain herself in independent living\par has now moved to assisted living\par \par Prior PCP Dr. Kiko Flower ([facility])/Prior PCP Dr. Real\par \par update\par off prednisone \par arthritis pain is worse epsecially in AM\par had been trialing meloxicam with no relief\par also on tylenol BID\par \par barely using clonazepam\par still reliant on ambien\par \par \par \par \par \par \par \par  [Two or more falls in past year] : Patient reported two or more falls in the past year [Independent] : using telephone [Some assistance needed] : managing finances [Full assistance needed] : preparing meals [Walker] : walker [1] : 2) Feeling down, depressed, or hopeless for several days [PHQ-2 Score ___] : PHQ-2 Score [unfilled]

## 2021-05-17 NOTE — ASSESSMENT
[FreeTextEntry1] : new patient to [facility] \par \par off of prednisone\par A1C downtrending but not normal\par son passed recently from pancreatic cancer\par \par we had an open discussion about etiology of late onset DM\par she does not want aggressive testing done\par will discuss with son as well\par \par plan is palliative\par pain management and she wants to know why she feels acute SOB at times\par \par PRN clonazepam\par may consider increasing paxil despite her age which might help with her severe anxiety/panic \par \par insomnia\par ambien chronically\par in future consider memory testing\par \par seen in assisted living apartment due to frequent falls,  mobility high fall risk\par \par

## 2021-05-17 NOTE — PHYSICAL EXAM
PT Referral completed and faxed. Susan   [General Appearance - Alert] : alert [General Appearance - In No Acute Distress] : in no acute distress [General Appearance - Well Nourished] : well nourished [Normal Oral Mucosa] : normal oral mucosa [No Oral Pallor] : no oral pallor [Outer Ear] : the ears and nose were normal in appearance [Neck Appearance] : the appearance of the neck was normal [Neck Cervical Mass (___cm)] : no neck mass was observed [Jugular Venous Distention Increased] : there was no jugular-venous distention [Respiration, Rhythm And Depth] : normal respiratory rhythm and effort [Auscultation Breath Sounds / Voice Sounds] : lungs were clear to auscultation bilaterally [Exaggerated Use Of Accessory Muscles For Inspiration] : no accessory muscle use [Heart Rate And Rhythm] : heart rate was normal and rhythm regular [Heart Sounds Gallop] : no gallops [Heart Sounds Pericardial Friction Rub] : no pericardial rub [Bowel Sounds] : normal bowel sounds [Abdomen Soft] : soft [Abdomen Tenderness] : non-tender [Cervical Lymph Nodes Enlarged Posterior Bilaterally] : posterior cervical [Cervical Lymph Nodes Enlarged Anterior Bilaterally] : anterior cervical [No CVA Tenderness] : no ~M costovertebral angle tenderness [FreeTextEntry1] : walker, notably gait dysfunction [Skin Color & Pigmentation] : normal skin color and pigmentation [Skin Turgor] : normal skin turgor [] : no rash

## 2021-05-18 ENCOUNTER — APPOINTMENT (OUTPATIENT)
Dept: NEUROLOGY | Facility: CLINIC | Age: 86
End: 2021-05-18
Payer: MEDICARE

## 2021-05-18 VITALS
BODY MASS INDEX: 24.99 KG/M2 | HEART RATE: 84 BPM | HEIGHT: 65 IN | DIASTOLIC BLOOD PRESSURE: 72 MMHG | SYSTOLIC BLOOD PRESSURE: 125 MMHG | WEIGHT: 150 LBS | TEMPERATURE: 97.2 F

## 2021-05-18 PROCEDURE — 99214 OFFICE O/P EST MOD 30 MIN: CPT

## 2021-05-24 ENCOUNTER — RX RENEWAL (OUTPATIENT)
Age: 86
End: 2021-05-24

## 2021-05-24 RX ORDER — ERGOCALCIFEROL 1.25 MG/1
1.25 MG CAPSULE, LIQUID FILLED ORAL
Qty: 8 | Refills: 10 | Status: ACTIVE | COMMUNITY
Start: 2021-03-24 | End: 1900-01-01

## 2021-05-25 ENCOUNTER — RX RENEWAL (OUTPATIENT)
Age: 86
End: 2021-05-25

## 2021-05-26 ENCOUNTER — APPOINTMENT (OUTPATIENT)
Dept: GERIATRICS | Facility: ASSISTED LIVING FACILITY | Age: 86
End: 2021-05-26
Payer: MEDICARE

## 2021-05-26 ENCOUNTER — NON-APPOINTMENT (OUTPATIENT)
Age: 86
End: 2021-05-26

## 2021-05-28 VITALS
RESPIRATION RATE: 18 BRPM | TEMPERATURE: 97.3 F | HEART RATE: 78 BPM | SYSTOLIC BLOOD PRESSURE: 117 MMHG | DIASTOLIC BLOOD PRESSURE: 64 MMHG

## 2021-05-28 NOTE — HISTORY OF PRESENT ILLNESS
[Two or more falls in past year] : Patient reported two or more falls in the past year [Independent] : using telephone [Some assistance needed] : managing finances [Full assistance needed] : preparing meals [Walker] : walker [1] : 2) Feeling down, depressed, or hopeless for several days [PHQ-2 Score ___] : PHQ-2 Score [unfilled] [FreeTextEntry1] : New to [facility]\par Had been living in an apartment in [place] with extensive assistance from a house keeper\par unable to maintain herself in independent living\par has now moved to assisted living\par \par Prior PCP Dr. Kiko Flower ([facility])/Prior PCP Dr. Real\par \par update\par off prednisone \par arthritis pain is worse especially in AM\par also on tylenol BID\par now on gabapentin for RLS\par \par barely using clonazepam\par still reliant on ambien\par \par \par \par \par \par \par \par

## 2021-05-28 NOTE — PHYSICAL EXAM
[General Appearance - Alert] : alert [General Appearance - In No Acute Distress] : in no acute distress [General Appearance - Well Nourished] : well nourished [Normal Oral Mucosa] : normal oral mucosa [No Oral Pallor] : no oral pallor [Outer Ear] : the ears and nose were normal in appearance [Neck Appearance] : the appearance of the neck was normal [Neck Cervical Mass (___cm)] : no neck mass was observed [Jugular Venous Distention Increased] : there was no jugular-venous distention [Respiration, Rhythm And Depth] : normal respiratory rhythm and effort [Exaggerated Use Of Accessory Muscles For Inspiration] : no accessory muscle use [Auscultation Breath Sounds / Voice Sounds] : lungs were clear to auscultation bilaterally [Heart Rate And Rhythm] : heart rate was normal and rhythm regular [Heart Sounds Gallop] : no gallops [Heart Sounds Pericardial Friction Rub] : no pericardial rub [Bowel Sounds] : normal bowel sounds [Abdomen Tenderness] : non-tender [Abdomen Soft] : soft [Cervical Lymph Nodes Enlarged Posterior Bilaterally] : posterior cervical [Cervical Lymph Nodes Enlarged Anterior Bilaterally] : anterior cervical [No CVA Tenderness] : no ~M costovertebral angle tenderness [Skin Color & Pigmentation] : normal skin color and pigmentation [Skin Turgor] : normal skin turgor [] : no rash [FreeTextEntry1] : walker, notably gait dysfunction

## 2021-05-28 NOTE — ASSESSMENT
[FreeTextEntry1] : new patient to [facility] \par \par off of prednisone\par A1C downtrending but not normal\par son passed recently from pancreatic cancer\par \par we had an open discussion about etiology of late onset DM\par she does not want aggressive testing done\par discussed with son as well\par \par plan is palliative\par pain management and she wants to know why she feels acute SOB at times\par will follow with pulmonary for consulation\par \par PRN clonazepam\par have increase paxil in hopes of stopping clonazpeam in near future\par \par insomnia\par ambien chronically\par now on gabapentin for RLS\par in future consider memory testing\par \par plan is to follow with pulmonary\par watch response to increased paxil as well as gabapentin\par Vit D bolus dosing twice a week\par \par \par seen in assisted living apartment due to frequent falls,  mobility high fall risk\par \par

## 2021-06-09 ENCOUNTER — APPOINTMENT (OUTPATIENT)
Dept: GERIATRICS | Facility: ASSISTED LIVING FACILITY | Age: 86
End: 2021-06-09
Payer: MEDICARE

## 2021-06-09 ENCOUNTER — RX RENEWAL (OUTPATIENT)
Age: 86
End: 2021-06-09

## 2021-06-09 VITALS
SYSTOLIC BLOOD PRESSURE: 141 MMHG | TEMPERATURE: 96.7 F | DIASTOLIC BLOOD PRESSURE: 74 MMHG | RESPIRATION RATE: 20 BRPM | HEART RATE: 67 BPM

## 2021-06-09 DIAGNOSIS — E55.9 VITAMIN D DEFICIENCY, UNSPECIFIED: ICD-10-CM

## 2021-06-09 RX ORDER — GABAPENTIN 100 MG/1
100 CAPSULE ORAL AT BEDTIME
Qty: 30 | Refills: 5 | Status: COMPLETED | COMMUNITY
Start: 2021-05-18 | End: 2021-06-09

## 2021-06-09 RX ORDER — SULFAMETHOXAZOLE AND TRIMETHOPRIM 800; 160 MG/1; MG/1
800-160 TABLET ORAL
Qty: 14 | Refills: 0 | Status: COMPLETED | COMMUNITY
Start: 2021-04-26 | End: 2021-06-09

## 2021-06-09 NOTE — ASSESSMENT
[FreeTextEntry1] : off of prednisone\par A1C downtrending but remains elevated and also continues to have frequent UTIs\par son passed recently from pancreatic cancer\par she is clear she does not want any invasive testing\par goal is more pallaitive/symptom based\par \par KELSEY\par pulm consult pending\par very important to patient\par wants to know why she feels acute SOB at times\par \par hoping to have PFTs and to rule out lung etiology vs anxiety/panic\par \par DM\par A1c remains elevated\par she understands risks of not being on treatment\par goal remains minimizing meds\par \par OA\par had been on prednisone 7.5 mg daily\par but I have tapered\par pain is escalating\par continue tylenol and celebrex\par but once pulmonary workup is complete may put her back on prednisone 2.5mg daily for pallaitive reasons\par - goals of care - quality of life\par \par anxiety\par on higher dose paxil\par ultimate goal is tostop clonazepam\par basely using at this time but needs during acute panic attacks\par \par Vit D bolus dosing twice a week\par \par \par plan is palliative\par \par polypharmacy\par goal is to avoid Meds\par stop gabapentin\par stop PM celebrex\par stop methenamine\par \par \par seen in assisted living apartment due to frequent falls,  mobility high fall risk\par \par

## 2021-06-09 NOTE — PHYSICAL EXAM
[General Appearance - Alert] : alert [General Appearance - Well Nourished] : well nourished [General Appearance - In No Acute Distress] : in no acute distress [Normal Oral Mucosa] : normal oral mucosa [No Oral Pallor] : no oral pallor [Outer Ear] : the ears and nose were normal in appearance [Neck Appearance] : the appearance of the neck was normal [Neck Cervical Mass (___cm)] : no neck mass was observed [Jugular Venous Distention Increased] : there was no jugular-venous distention [Respiration, Rhythm And Depth] : normal respiratory rhythm and effort [Exaggerated Use Of Accessory Muscles For Inspiration] : no accessory muscle use [Auscultation Breath Sounds / Voice Sounds] : lungs were clear to auscultation bilaterally [Heart Rate And Rhythm] : heart rate was normal and rhythm regular [Heart Sounds Gallop] : no gallops [Heart Sounds Pericardial Friction Rub] : no pericardial rub [Bowel Sounds] : normal bowel sounds [Abdomen Soft] : soft [Abdomen Tenderness] : non-tender [Cervical Lymph Nodes Enlarged Posterior Bilaterally] : posterior cervical [Cervical Lymph Nodes Enlarged Anterior Bilaterally] : anterior cervical [No CVA Tenderness] : no ~M costovertebral angle tenderness [Skin Color & Pigmentation] : normal skin color and pigmentation [Skin Turgor] : normal skin turgor [] : no rash [FreeTextEntry1] : walker, notably gait dysfunction

## 2021-06-09 NOTE — HISTORY OF PRESENT ILLNESS
[Two or more falls in past year] : Patient reported two or more falls in the past year [Independent] : using telephone [Some assistance needed] : managing finances [Full assistance needed] : preparing meals [Walker] : walker [1] : 2) Feeling down, depressed, or hopeless for several days [PHQ-2 Score ___] : PHQ-2 Score [unfilled] [FreeTextEntry1] : Had been living in an apartment in [place] with extensive assistance from a house keeper\par unable to maintain herself in independent living\par has now moved to assisted living at [facility] on [address]\par \par Prior PCP Dr. Kiko Flower ([facility])/Prior PCP Dr. Real\par \par update\par off prednisone \par still having joint pains worse in morning\par had started gabapentin for suspected neuropathy/RLS\par but felt tired and fatigued/sleepy in AM\par wanting to stop has been refusing medication on her own\par feels KELSEY and wants to see pulmonary for workup\par apparently had PFTs several years ago in [facility]\par but I want to have a new baseline for her\par \par barely using clonazepam\par still reliant on ambien\par wants to minimize meds \par \par \par \par \par \par \par \par

## 2021-06-14 ENCOUNTER — APPOINTMENT (OUTPATIENT)
Dept: PULMONOLOGY | Facility: CLINIC | Age: 86
End: 2021-06-14
Payer: MEDICARE

## 2021-06-14 VITALS
DIASTOLIC BLOOD PRESSURE: 60 MMHG | SYSTOLIC BLOOD PRESSURE: 140 MMHG | BODY MASS INDEX: 24.99 KG/M2 | HEIGHT: 65 IN | OXYGEN SATURATION: 98 % | WEIGHT: 150 LBS | HEART RATE: 74 BPM

## 2021-06-14 PROCEDURE — 99205 OFFICE O/P NEW HI 60 MIN: CPT

## 2021-06-14 NOTE — HISTORY OF PRESENT ILLNESS
[TextBox_4] : 89 year old female with arthritis, dyspnea on exertion was referred by Dr Rachel for evaluation of dyspnea.\par She was in [place] ED in January and had CTA chest, reviewed: questionable filling defect in the Rt LL PA- only in one vessel\par She was seen by Dr Cr who recommended AC for 3 months.\par According to Dr ASHBY notes she did not want to take AC.\par \par She moved in [place] 3 months ago.\par She c/o dyspnea on exertion for 3-4 years.\par She saw a pulmonologist 3 years ago and was referred to pulmonary rehab.\par She felt better after that.\par She was seen by another pulmonologist and was told to use a Vibrapep device. She used that for months with improvement\par She has dyspnea on minimal exercise and especially when she bends over.\par Denies cough, CP, seasonal allergies.\par I noticed that when nshe tried to find her son's phone number she started breathing fast.\par \par She takes Ambien for many years for insomnia.\par She does not remember how she sleeps without Ambien.\par BT 9 pm\par SLT short\par Wakes up 2 times \par Wakes up at 6 am. The [place] staff wakes her up at 6 am.\par She was sleeping better at home.\par Naps after lunch 1 hr.\par Does not know if she snores.\par \par I spoke with her son on the phone as well\par he agrees with the plan.\par \par SHX: non smoker\par \par \par \par

## 2021-06-14 NOTE — REVIEW OF SYSTEMS
[Dyspnea] : dyspnea [Edema] : edema [Arthralgias] : arthralgias [Anxiety] : anxiety [Negative] : Endocrine

## 2021-06-14 NOTE — PHYSICAL EXAM
[No Acute Distress] : no acute distress [Normal Oropharynx] : normal oropharynx [II] : Mallampati Class: II [Normal Appearance] : normal appearance [No Neck Mass] : no neck mass [Normal Rate/Rhythm] : normal rate/rhythm [Normal S1, S2] : normal s1, s2 [No Resp Distress] : no resp distress [No Acc Muscle Use] : no acc muscle use [Clear to Auscultation Bilaterally] : clear to auscultation bilaterally [Benign] : benign [No Clubbing] : no clubbing [No Cyanosis] : no cyanosis [No Edema] : no edema [No Focal Deficits] : no focal deficits [Oriented x3] : oriented x3 [Normal Affect] : normal affect

## 2021-07-07 ENCOUNTER — APPOINTMENT (OUTPATIENT)
Dept: GERIATRICS | Facility: ASSISTED LIVING FACILITY | Age: 86
End: 2021-07-07
Payer: MEDICARE

## 2021-07-07 VITALS
TEMPERATURE: 98.3 F | DIASTOLIC BLOOD PRESSURE: 74 MMHG | OXYGEN SATURATION: 95 % | HEART RATE: 86 BPM | RESPIRATION RATE: 20 BRPM | SYSTOLIC BLOOD PRESSURE: 144 MMHG

## 2021-07-07 NOTE — HISTORY OF PRESENT ILLNESS
[FreeTextEntry1] : Had been living in an apartment in [place] with extensive assistance from a house keeper\par unable to maintain herself in independent living\par has now moved to assisted living at [facility] on [address]\par \par Prior PCP Dr. Kiko Flower ([facility])/Prior PCP Dr. Real\par \par update\par off prednisone \par still having joint pains worse in morning\par now off of gabapentin and UTI ppx\par awaiting results of pulmonary function testing\par \par barely using clonazepam\par still reliant on ambien\par wants to minimize meds \par \par \par \par \par \par \par \par  [Two or more falls in past year] : Patient reported two or more falls in the past year [Independent] : using telephone [Some assistance needed] : managing finances [Full assistance needed] : preparing meals [Walker] : walker [1] : 2) Feeling down, depressed, or hopeless for several days [PHQ-2 Score ___] : PHQ-2 Score [unfilled]

## 2021-07-13 ENCOUNTER — RESULT REVIEW (OUTPATIENT)
Age: 86
End: 2021-07-13

## 2021-07-21 ENCOUNTER — APPOINTMENT (OUTPATIENT)
Dept: GERIATRICS | Facility: ASSISTED LIVING FACILITY | Age: 86
End: 2021-07-21
Payer: MEDICARE

## 2021-07-21 ENCOUNTER — APPOINTMENT (OUTPATIENT)
Dept: PULMONOLOGY | Facility: CLINIC | Age: 86
End: 2021-07-21
Payer: MEDICARE

## 2021-07-21 VITALS
SYSTOLIC BLOOD PRESSURE: 136 MMHG | HEART RATE: 75 BPM | DIASTOLIC BLOOD PRESSURE: 64 MMHG | TEMPERATURE: 97.5 F | RESPIRATION RATE: 18 BRPM

## 2021-07-21 VITALS
WEIGHT: 150 LBS | HEIGHT: 65 IN | TEMPERATURE: 97.5 F | HEART RATE: 66 BPM | DIASTOLIC BLOOD PRESSURE: 72 MMHG | SYSTOLIC BLOOD PRESSURE: 130 MMHG | OXYGEN SATURATION: 97 % | BODY MASS INDEX: 24.99 KG/M2 | RESPIRATION RATE: 20 BRPM

## 2021-07-21 DIAGNOSIS — G47.00 INSOMNIA, UNSPECIFIED: ICD-10-CM

## 2021-07-21 PROCEDURE — 99214 OFFICE O/P EST MOD 30 MIN: CPT

## 2021-07-21 RX ORDER — CIPROFLOXACIN HYDROCHLORIDE 500 MG/1
500 TABLET, FILM COATED ORAL
Qty: 14 | Refills: 0 | Status: COMPLETED | COMMUNITY
Start: 2021-05-28 | End: 2021-07-21

## 2021-07-21 RX ORDER — CEPHALEXIN 500 MG/1
500 CAPSULE ORAL
Qty: 28 | Refills: 0 | Status: COMPLETED | COMMUNITY
Start: 2021-05-25 | End: 2021-07-21

## 2021-07-21 RX ORDER — NETARSUDIL AND LATANOPROST OPHTHALMIC SOLUTION, 0.02%/0.005% .2; .05 MG/ML; MG/ML
0.02-0.005 SOLUTION/ DROPS OPHTHALMIC; TOPICAL AT BEDTIME
Refills: 0 | Status: ACTIVE | COMMUNITY
Start: 2021-02-12

## 2021-07-21 RX ORDER — DORZOLAMIDE HYDROCHLORIDE 20 MG/ML
2 SOLUTION OPHTHALMIC TWICE DAILY
Refills: 0 | Status: ACTIVE | COMMUNITY
Start: 2021-02-01

## 2021-07-21 RX ORDER — METHENAMINE HIPPURATE 1 G/1
1 TABLET ORAL
Qty: 60 | Refills: 0 | Status: COMPLETED | COMMUNITY
Start: 2021-05-26 | End: 2021-07-21

## 2021-07-21 NOTE — ASSESSMENT
[FreeTextEntry1] : very complicated history\par \par KELSEY\par pulm consult done awaiting PFTs\par very important to patient\par wants to know why she feels acute SOB at times\par \par DM\par A1C downtrending but remains elevated \par son passed recently from pancreatic cancer\par she is clear she does not want any invasive testing\par she understands risks of not being on treatment\par goal is more palliative/symptom based\par \par OA\par had been on prednisone 7.5 mg daily\par now entirely off of prednisone\par but I have tapered\par pain persists\par continue tylenol and celebrex\par but once pulmonary workup is complete may put her back on prednisone 2.5mg daily for palliative reasons\par goal is to avoid as I suspect this worsened her sugars and anxiety\par \par anxiety\par on higher dose paxil\par ultimate goal is to stop clonazepam on BID PRN will lower to daily PRN on next visit\par basely using at this time but needs during acute panic attacks\par \par Vit D bolus dosing twice a week\par \par Insomnia\par tolerated ambien taper to 2.5mg QHS\par next will be to lower to 2.5mg QOD x 14 days then stop\par will continue to taper to get off of this drug as possible\par \par diarrhea\par continue PRN immodium daily\par if no improvement by Monday will increase to BID PRN\par \par next steps include considering metformin given hx diarrhea I was trying to avoid \par \par \par \par \par seen in assisted living apartment due to frequent falls,  mobility high fall risk\par \par

## 2021-07-21 NOTE — HISTORY OF PRESENT ILLNESS
[TextBox_4] : 89 year old female with dyspnea, anxiety, insomnia for f/u.\par Last seen in June 2021.\par She had PFT's which were normal\par Echo with normal EF and PA pressure, mild valvulopathy\par She was on Ambien 5 mg for insomnia and we discussed about stopping Ambien.\par \par She feels better since she decreased some medications.\par She is taking 1/2 pill of Ambien.\par She sleeps well with less Ambien.\par Off Prednisone as per Dr ASHBY.\par Still has some dyspnea at times, she still feels it is related to anxiety.\par She walks well without dyspnea.\par Denies cough, chest tightness, wheezes\par \par

## 2021-07-21 NOTE — PHYSICAL EXAM
[No Acute Distress] : no acute distress [Normal Appearance] : normal appearance [Normal Rate/Rhythm] : normal rate/rhythm [No Resp Distress] : no resp distress [Clear to Auscultation Bilaterally] : clear to auscultation bilaterally [Kyphosis] : kyphosis [No Clubbing] : no clubbing [No Cyanosis] : no cyanosis [No Focal Deficits] : no focal deficits [Oriented x3] : oriented x3 [Normal Affect] : normal affect

## 2021-07-21 NOTE — HISTORY OF PRESENT ILLNESS
[1] : 2) Feeling down, depressed, or hopeless for several days [PHQ-2 Score ___] : PHQ-2 Score [unfilled] [FreeTextEntry1] : Had been living in an apartment in [place] with extensive assistance from a house keeper\par unable to maintain herself in independent living\par has now moved to assisted living at [facility] on [address]\par \par Prior PCP Dr. Kiko Flower ([facility])/Prior PCP Dr. Real\par \par update\par working with pulmonary to see if any treatable pulmonary disease\par to see pulmonary later today\par tapered ambien to 2.5mg and doing well\par feels more clear in AM\par barely using clonazepam\par having diarrhea intermittently and using immodium PRN\par wants to minimize meds \par \par \par \par \par \par \par \par

## 2021-07-21 NOTE — REVIEW OF SYSTEMS
[Fever] : no fever [Chills] : no chills [Feeling Tired] : feeling tired [Eyesight Problems] : eyesight problems [Discharge From Eyes] : no purulent discharge from the eyes [Nosebleeds] : no nosebleeds [Nasal Discharge] : no nasal discharge [Chest Pain] : no chest pain [Palpitations] : no palpitations [Cough] : no cough [SOB on Exertion] : no shortness of breath during exertion [Abdominal Pain] : no abdominal pain [Vomiting] : no vomiting [Incontinence] : incontinence [Arthralgias] : arthralgias [Joint Pain] : joint pain [Skin Lesions] : no skin lesions [Dizziness] : dizziness [Skin Wound] : no skin wound [Fainting] : no fainting [Anxiety] : anxiety

## 2021-07-21 NOTE — REVIEW OF SYSTEMS
[Dyspnea] : dyspnea [Diarrhea] : diarrhea [Arthralgias] : arthralgias [Anxiety] : anxiety [Negative] : Hematologic

## 2021-08-25 ENCOUNTER — APPOINTMENT (OUTPATIENT)
Dept: GERIATRICS | Facility: ASSISTED LIVING FACILITY | Age: 86
End: 2021-08-25

## 2021-08-27 ENCOUNTER — APPOINTMENT (OUTPATIENT)
Dept: FAMILY MEDICINE | Facility: ASSISTED LIVING FACILITY | Age: 86
End: 2021-08-27
Payer: MEDICARE

## 2021-08-27 DIAGNOSIS — Z74.1 NEED FOR ASSISTANCE WITH PERSONAL CARE: ICD-10-CM

## 2021-08-27 PROCEDURE — G0444 DEPRESSION SCREEN ANNUAL: CPT | Mod: 59

## 2021-08-27 PROCEDURE — G0442 ANNUAL ALCOHOL SCREEN 15 MIN: CPT | Mod: 59

## 2021-08-27 RX ORDER — ZOLPIDEM TARTRATE 5 MG/1
5 TABLET ORAL EVERY OTHER DAY
Qty: 3 | Refills: 5 | Status: DISCONTINUED | COMMUNITY
Start: 2019-02-04 | End: 2021-08-27

## 2021-08-27 RX ORDER — PAROXETINE HYDROCHLORIDE 10 MG/1
10 TABLET, FILM COATED ORAL
Qty: 30 | Refills: 11 | Status: ACTIVE | COMMUNITY
Start: 2021-03-03 | End: 1900-01-01

## 2021-08-27 RX ORDER — SULFAMETHOXAZOLE AND TRIMETHOPRIM 800; 160 MG/1; MG/1
800-160 TABLET ORAL
Qty: 10 | Refills: 0 | Status: COMPLETED | COMMUNITY
Start: 2021-07-30 | End: 2021-08-27

## 2021-08-27 NOTE — PLAN
[FreeTextEntry1] : Take Tylenol 1,500 mg po BID\par Consider GI eval for fecal incontinence\par Need help with ADL 1-2 hrs TIW\par F/U prn

## 2021-08-27 NOTE — HEALTH RISK ASSESSMENT
[Fair] :  ~his/her~ mood as fair [No] : No [No falls in past year] : Patient reported no falls in the past year [1] : 2) Feeling down, depressed, or hopeless for several days (1) [] : No [[id number]] : 2

## 2021-08-27 NOTE — REVIEW OF SYSTEMS
[Fatigue] : fatigue [Shortness Of Breath] : shortness of breath [Cough] : cough [Negative] : Eyes [FreeTextEntry7] : fecal incontinenecs

## 2021-08-27 NOTE — HISTORY OF PRESENT ILLNESS
[FreeTextEntry1] : Establish care\par Exertional dyspnea\par Anxiety/depression\par fecal incontinence\par Difficulties with ADL\par  [de-identified] : [facility] resident, changed her primary care \par Seen in her apartment\par Has the above complaints

## 2021-09-17 ENCOUNTER — APPOINTMENT (OUTPATIENT)
Dept: GASTROENTEROLOGY | Facility: CLINIC | Age: 86
End: 2021-09-17
Payer: MEDICARE

## 2021-09-17 VITALS
OXYGEN SATURATION: 97 % | BODY MASS INDEX: 26.49 KG/M2 | TEMPERATURE: 98.4 F | WEIGHT: 159 LBS | DIASTOLIC BLOOD PRESSURE: 70 MMHG | HEIGHT: 65 IN | HEART RATE: 65 BPM | SYSTOLIC BLOOD PRESSURE: 118 MMHG

## 2021-09-17 DIAGNOSIS — R19.7 DIARRHEA, UNSPECIFIED: ICD-10-CM

## 2021-09-17 PROCEDURE — 99204 OFFICE O/P NEW MOD 45 MIN: CPT

## 2021-09-17 NOTE — PHYSICAL EXAM
[General Appearance - Alert] : alert [General Appearance - In No Acute Distress] : in no acute distress [Sclera] : the sclera and conjunctiva were normal [Outer Ear] : the ears and nose were normal in appearance [] : no respiratory distress [Abdomen Soft] : soft [Skin Color & Pigmentation] : normal skin color and pigmentation [No Focal Deficits] : no focal deficits [Oriented To Time, Place, And Person] : oriented to person, place, and time [FreeTextEntry1] : ambulates with a  walker

## 2021-09-17 NOTE — HISTORY OF PRESENT ILLNESS
[de-identified] : SHERRILL WILSON  is being evaluated at the request of Dr. Maik Toscano for an opinion re: fecal incontinence  and  diarrhea. patient  reports  2-3  weeks  of loose / small quantity stools. Has  had  similar  sxs over the years, but  only occurred every 1-2  months. Apparently had  rectal manometry test ~  5 years ago for fecal incontinence which apparently was managed with Imodium and  Kegel exercises.  Denies nausea, vomiting, fever, chills, melena, hematemesis, BRBPR.  Describes  a normal colonoscopy within the  past 10 years.\par \par Denies  recent  travel / antibiotics / change in medication\par \par Most recent PMD  note  reviewed\par

## 2021-09-17 NOTE — ASSESSMENT
[FreeTextEntry1] : 1. Change in bowel habits. Currently with exacerbation of  what appears to be a more chronic  complaint.  Stool studies requested to exclude infection etiology given recent  exacerbation. PRN  Imodium. Further plans  pending stool tests. Follow up 2 weeks  after submitting stool samples\par \par 2. 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 recommended daily Kegel exercises (  daily). May consider adding artificial fiber  once stool tests resulted

## 2021-09-20 ENCOUNTER — APPOINTMENT (OUTPATIENT)
Dept: NEUROLOGY | Facility: CLINIC | Age: 86
End: 2021-09-20
Payer: MEDICARE

## 2021-09-20 VITALS
HEART RATE: 73 BPM | WEIGHT: 159 LBS | HEIGHT: 65 IN | DIASTOLIC BLOOD PRESSURE: 65 MMHG | SYSTOLIC BLOOD PRESSURE: 109 MMHG | TEMPERATURE: 97.5 F | BODY MASS INDEX: 26.49 KG/M2

## 2021-09-20 DIAGNOSIS — G62.9 POLYNEUROPATHY, UNSPECIFIED: ICD-10-CM

## 2021-09-20 PROCEDURE — 99215 OFFICE O/P EST HI 40 MIN: CPT

## 2021-09-20 NOTE — DISCUSSION/SUMMARY
[FreeTextEntry1] : Noelle is a pleasant 89-year- old lady with history of PE?, anxiety, chronic insomnia, presenting for evaluation of memory changes and a fall with head injury resulting in ED visit. 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 \par \par Unclear etiology of falls. Overall low suspicion of seizures, although right occipital encephalomalacia can serve as nidus for seizure. Ambulatory EEG 4/13/21 was normal. \par \par Doing well with regard to sleep. May still have symptoms of anxiety/panic but this seems to be improving with addition of paxil, could maybe optimize? Unclear if she also has some shortness of breath on exertion, unclear - > seemed short of breath when coming to the examination table - may benefit from pulmonary eval - will defer to primary care \par \par

## 2021-09-20 NOTE — HISTORY OF PRESENT ILLNESS
[FreeTextEntry1] : Last seen in clinic on March 5, 2021. Presenting to clinic with daughter-in-law. No falls. Ambulates with rolling walker. No clinical events concerning for seizure. Reports shortness of breath with minimal activity. Reports "extreme pain in legs" sometimes with sharp shooting pain in right leg. \par \par She is taking celebrex and used to take prednisone for pain. Prednisone was stopped due to blood sugar as per her report. She felt it controlled her pain better. \par \par Other than that she is doing well. She suddenly has episodes of anxiety or panic like attacks for which she takes klonopin. Sleeping well.  Adjusting better overall to assisted living facility, [facility]. \par \par Medications: \par vitamin B12 2500 mcg \par paxil 20 mg tablet qd \par ambien 5 mg daily \par ambien 5 mg prn at 2 am \par clonazepam 0.25 mg sublingual bid \par vitamin d2 \par celebrex \par \par ________________________________________\par Noelle is a pleasant 89-year-old lady, with history of anxiety on klonopin and insomnia, on ambien, resident of [facility] on [place], history of possible PE (unclear, now refusing anticoagulation), presenting with episodes of panic and shaking, rule out seizure.  She had recent ED visit due to falls with head injury. \par Referred to neurology for work up of fall and memory changes.   \par \par Overall, Noelle reports having difficulty adjusting to the new environment of [facility]. She is used to having her own apartment and had an assistant who helped her with most IADLs. 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 Now she finds it difficult to concentrate and she gets frustrated when asked to shift from one task to another. Recently started on Paxil. \par Stressors include losing son to pancreatic cancer \par \par She tries to get enough sleep, sleeps from 9:30-6:30 pm. She relies on zolpidem of sleep  and clonazepam for anxiety . She feels tense and gets angry sometimes. She feels her head "going funny" and did have a fall. She thinks she "went unconscious". She hit her head. No observed tonic clonic activity or history of seizures. \par \par As per ED notes, she had a fall 2/12/21 and was evaluated by ED. She reported feeling more confused with head pain and returned to ED at [facility] 2/14/21.  She had a CT head which showed small vessel ischemic changes and an area of right occipital encephalomalacia compatible with old infarct with bilateral lacunar infarcts. No evidence of hemorrhage or territorial infarct. \par \par \par \par \par \par

## 2021-09-20 NOTE — PHYSICAL EXAM
[FreeTextEntry1] : \par Mental Status: AxOx3, able to spell "world"backwards, able to do serial sevens with ease \par 3/3 registration of 3 items, 3/3 recall \par CN: visual acuity, VFF, blink to confrontation \par III, IV, VI, PERRL, EOMI \par V sensation normal to light touch, pinprick\par VII normal squint vs resistance, normal smile, face symmetric \par VIII: decreased hearing to voice \par IX, X normal gag, symmetric palate, uvula raises midline \par XI normal shrug versus resistance and lateralization of head versus resistance \par XII tongue symmetric, normal strength, no tremor \par Sensory: normal to LT, diminished vibratory sensation at toes \par Brachioradialis, biceps, triceps 1+, patella 1+, ankle jerks 1+ \par Plantar flexor response bilaterally \par Coordination: no dysmetria on FNF \par Gait : antalgic, unsteady gait \par

## 2021-09-20 NOTE — DISCUSSION/SUMMARY
[FreeTextEntry1] : Noelle is a pleasant 89-year- old lady with history of PE?, anxiety, chronic insomnia, presenting for evaluation of memory changes and a fall with head injury resulting in ED visit. 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 \par \par Unclear etiology of falls. Overall low suspicion of seizures, although right occipital encephalomalacia can serve as nidus for seizure. \par \par Has some difficulties with set shifting, executive dysfunction but overall impression is that she is very anxious. Has some ataxia on left FNF - this was not seen on prior exam. Unclear if this is related to vision (right occipital infarct, unclear if she has left visual field cut). Out of precaution, will do repeat CT head. She may not be able to tolerate an MRI. \par \par Seen by pulmonary for dyspnea on exertion - normal PFTs. \par \par  \par

## 2021-09-20 NOTE — PHYSICAL EXAM
[FreeTextEntry1] : Cardiac: RRR, no murmurs/rubs/gallops \par Mental Status: alert and oriented to person, place, time. Knows the name of the hospital. Able to name pen, thumb. Can calculate number of quarters in $1.50. Able to do serial sevens. Able to spell "world" backwards. \par STM 3/3 immediate, 3/3 at five minutes \par CN: visual acuity, VFF, blink to confrontation \par III, IV, VI, PERRL, EOMI \par V sensation normal to light touch, pinprick\par VII normal squint vs resistance, normal smile, face symmetric \par VIII: diminished hearing to voice \par IX, X normal gag, symmetric palate, uvula raises midline \par XI normal shrug versus resistance and lateralization of head versus resistance \par XII tongue symmetric, normal strength \par Motor: at least 4/5 throughout \par Sensory: normal to LT, diminished sensation to light touch in toes and fingertips \par Reflexes \par Brachioradialis, biceps, triceps, patella and ankles 1+ \par Plantar flexor response bilaterally \par Coordination: left upper extremity ataxia on FNF \par Gait : unsteady wide-based gait, neuropathy

## 2021-09-20 NOTE — CONSULT LETTER
[Dear  ___] : Dear  [unfilled], [Courtesy Letter:] : I had the pleasure of seeing your patient, [unfilled], in my office today. [Please see my note below.] : Please see my note below. [Sincerely,] : Sincerely, [FreeTextEntry3] : Alvaro Fletcher MD \par [facility]

## 2021-09-20 NOTE — DATA REVIEWED
[de-identified] : 4/13-4/14/21: normal aEEG \par 4/13/21: normal rEEG  [de-identified] : CT head: 2/14/21:\par Generalized prominence of the ventricles, sulci, and fissures is presumably age-related, and  periventricular white matter lucency is most compatible with chronic small vessel ischemic changes. An unchanged area of right occipital encephalomalacia is \par compatible with an old infarct and there are small bilateral lacunar infarcts.. There is no evidence of acute intracranial hemorrhage or territorial infarct, mass-effect or midline shift. \par \par 2/16/21: Hemoglobin A1c 9.4 high --> 7.6 \par \par \par \par

## 2021-09-20 NOTE — HISTORY OF PRESENT ILLNESS
[FreeTextEntry1] : Noelle is a 89-year-old lady with a past medical history of exertional dyspnea, anxiety/depression. She is presenting for follow-up. She was seen by Dr. Toscano who reports that she needs help with ADLs. She was seen by Dr. Philippe, pulmonologist. He did PFTs; these were normal. Echo was normal. His impression was that anxiety plays a role with dyspnea. Takes Tylenol and Celebrex for arthritis. She also has neuropathy – was taking gabapentin, not prescribed it now. \par \par She is doing well. No falls. She reports that she feels a sensation of shortness of breath in particular when she bends forward. She also generally feels unsteady as if she is going to fall. \par \par Now does physical therapy once a week. Has some anxiety regarding 2 nurses in the nursing facility. Feels they can be rude. \par ________________________________\par 5/18/2021 \par Last seen in clinic on March 5, 2021. Presenting to clinic with daughter-in-law. No falls. Ambulates with rolling walker. No clinical events concerning for seizure. Reports shortness of breath with minimal activity. Reports "extreme pain in legs" sometimes with sharp shooting pain in right leg. \par \par She is taking celebrex and used to take prednisone for pain. Prednisone was stopped due to blood sugar as per her report. She felt it controlled her pain better. \par \par Other than that she is doing well. She suddenly has episodes of anxiety or panic like attacks for which she takes klonopin. Sleeping well.  Adjusting better overall to assisted living facility, [facility]. \par \par Medications: \par vitamin B12 2500 mcg \par paxil 20 mg tablet qd \par ambien 5 mg daily \par ambien 5 mg prn at 2 am \par clonazepam 0.25 mg sublingual bid \par vitamin d2 \par celebrex \par \par ________________________________________\par Noelle is a pleasant 89-year-old lady, with history of anxiety on klonopin and insomnia, on ambien, resident of [facility] on [address], history of possible PE (unclear, now refusing anticoagulation), presenting with episodes of panic and shaking, rule out seizure.  She had recent ED visit due to falls with head injury. \par Referred to neurology for work up of fall and memory changes.   \par \par Overall, Noelle reports having difficulty adjusting to the new environment of [facility]. She is used to having her own apartment and had an assistant who helped her with most IADLs. 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 Now she finds it difficult to concentrate and she gets frustrated when asked to shift from one task to another. Recently started on Paxil. \par Stressors include losing son to pancreatic cancer \par \par She tries to get enough sleep, sleeps from 9:30-6:30 pm. She relies on zolpidem of sleep  and clonazepam for anxiety . She feels tense and gets angry sometimes. She feels her head "going funny" and did have a fall. She thinks she "went unconscious". She hit her head. No observed tonic clonic activity or history of seizures. \par \par As per ED notes, she had a fall 2/12/21 and was evaluated by ED. She reported feeling more confused with head pain and returned to ED at [facility] 2/14/21.  She had a CT head which showed small vessel ischemic changes and an area of right occipital encephalomalacia compatible with old infarct with bilateral lacunar infarcts. No evidence of hemorrhage or territorial infarct. \par \par \par \par \par \par

## 2021-09-20 NOTE — ASSESSMENT
[FreeTextEntry1] : -Doing well - ambulatory EEG 4/2021 was normal  - no AED needed \par -Could  consider increasing paxil, she seems less anxious as compared to last visit, it may be effective - still has \par episodes of panic that seem to respond to prn klonopin\par - continue physical therapy needs assistance with balance/gait \par - could try gabapentin 100 mg qhs for some symptoms of pain in extremities - could have neuropathy given diabetes , can up-titrate if tolerated \par \par - consider pulmonary referral given shortness of breath on exertion - will defer to primary care \par

## 2021-09-20 NOTE — ASSESSMENT
[FreeTextEntry1] : -CT head without contrast ataxia on left, unsteady gait\par -should continue Aspirin 81 mg qd for secondary stroke prevention\par -cardiology follow-up - rule out cardiac etiology for dyspnea on exertion - although suspect anxiety may be playing a role - occipital stroke suggests possible history of atrial fibrillation , should assess for paroxysmal atrial fibrillation \par - physical therapy for gait instability - 3 times a week  \par \par Return to clinic in three months or sooner if needed \par \par \par \par

## 2021-09-20 NOTE — DATA REVIEWED
[de-identified] : 4/13-4/14/21: normal aEEG \par 4/13/21: normal rEEG  [de-identified] : CT head: 2/14/21:\par Generalized prominence of the ventricles, sulci, and fissures is presumably age-related, and  periventricular white matter lucency is most compatible with chronic small vessel ischemic changes. An unchanged area of right occipital encephalomalacia is \par compatible with an old infarct and there are small bilateral lacunar infarcts.. There is no evidence of acute intracranial hemorrhage or territorial infarct, mass-effect or midline shift. \par \par 2/16/21: Hemoglobin A1c 9.4 high --> 7.6 \par \par \par \par

## 2021-09-21 LAB
C DIFF TOX GENS STL QL NAA+PROBE: NORMAL
CDIFF BY PCR: NOT DETECTED
G LAMBLIA AG STL QL: NORMAL

## 2021-09-22 LAB — DEPRECATED O AND P PREP STL: NORMAL

## 2021-09-23 LAB
BACTERIA STL CULT: NORMAL
CALPROTECTIN FECAL: 69 UG/G

## 2021-09-27 LAB
LACTOFERRIN STL-MCNC: NORMAL
PANCREATIC ELASTASE, FECAL: 191

## 2021-10-01 ENCOUNTER — APPOINTMENT (OUTPATIENT)
Dept: GERIATRICS | Facility: CLINIC | Age: 86
End: 2021-10-01
Payer: MEDICARE

## 2021-10-01 ENCOUNTER — NON-APPOINTMENT (OUTPATIENT)
Age: 86
End: 2021-10-01

## 2021-10-01 VITALS
HEART RATE: 70 BPM | DIASTOLIC BLOOD PRESSURE: 80 MMHG | TEMPERATURE: 97.2 F | SYSTOLIC BLOOD PRESSURE: 105 MMHG | RESPIRATION RATE: 18 BRPM

## 2021-10-01 DIAGNOSIS — K40.90 UNILATERAL INGUINAL HERNIA, W/OUT OBSTRUCTION OR GANGRENE, NOT SPECIFIED AS RECURRENT: ICD-10-CM

## 2021-10-01 DIAGNOSIS — K59.00 CONSTIPATION, UNSPECIFIED: ICD-10-CM

## 2021-10-01 DIAGNOSIS — R14.0 ABDOMINAL DISTENSION (GASEOUS): ICD-10-CM

## 2021-10-04 ENCOUNTER — RESULT REVIEW (OUTPATIENT)
Age: 86
End: 2021-10-04

## 2021-10-11 ENCOUNTER — NON-APPOINTMENT (OUTPATIENT)
Age: 86
End: 2021-10-11

## 2021-10-13 ENCOUNTER — APPOINTMENT (OUTPATIENT)
Dept: GASTROENTEROLOGY | Facility: CLINIC | Age: 86
End: 2021-10-13
Payer: MEDICARE

## 2021-10-13 VITALS
BODY MASS INDEX: 25.83 KG/M2 | WEIGHT: 155 LBS | HEART RATE: 72 BPM | DIASTOLIC BLOOD PRESSURE: 56 MMHG | SYSTOLIC BLOOD PRESSURE: 108 MMHG | HEIGHT: 65 IN | TEMPERATURE: 98.6 F

## 2021-10-13 PROCEDURE — 99213 OFFICE O/P EST LOW 20 MIN: CPT

## 2021-10-13 NOTE — ASSESSMENT
[FreeTextEntry1] : 1. Change in bowel habits ( diarrhea - intermittent) . Currently with exacerbation of  what appears to be a more chronic  complaint.  Stool studies requested to exclude infectious etiology were  negative. Recommend Imodium 1  tab after each loose bowel movement up to 4  tabs  daily. Recommend patient  taking Imodium with her if  leaving the facility.  Follow up as needed\par \par 2. 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  Continue daily Kegel exercises (  daily). Metamucil  or Citrucel  daily

## 2021-10-13 NOTE — PHYSICAL EXAM
[de-identified] : left side small inguinal hernia, soft and fully reducible [de-identified] : no hip pain on palpation with FROM

## 2021-10-13 NOTE — HISTORY OF PRESENT ILLNESS
[de-identified] : Presents  for follow up.  Stool studies reviewed from last visit were unremarkable. Evaluated in Sept 2021  fecal incontinence  and  diarrhea. Patient  reported  2-3  weeks  of loose / small quantity stools. Has  had  similar  sxs over the years, but  only occurred every 1-2  months. Apparently had  rectal manometry test ~  5 years ago for fecal incontinence which apparently was managed with Imodium and  Kegel exercises.  \par \par Denies nausea, vomiting, fever, chills, melena, hematemesis, BRBPR.  Describes  a normal colonoscopy within the  past 10 years.\par \par Denies  recent  travel / antibiotics / change in medication\par \par Most recent PMD  note  reviewed\par

## 2021-10-13 NOTE — ASSESSMENT
[FreeTextEntry1] : Pain possible caused by gas or large BM from accumulated\par stool. - poss IBS? \par After exam, pt stood up and was able to walk around apt feeling \par "all better"  and wanting to make a coffee.\par I explained if her pain recurs and persists she can push her PET and get full evaluation \par at the ED. Nursing staff also advised of this.\par May benefit from new referral to GI if hernia pain is persistent.\par

## 2021-10-13 NOTE — HISTORY OF PRESENT ILLNESS
[FreeTextEntry1] : Pt states she had pain this morning across her abdomen and \par then had a very large BM. No pain when lying flat in bed - only when \par sitting or walking.\par denies any diarrhea or fecal incontinence today.\par \par recently was seen by GI specialist\par appetite was wnl today\par no n/v/d, no chest pain, no palpitations, no sweating no weakness\par \par Believes the pain is coming from her left inguinal hernia and concerned would like\par to be checked by NP Pt seen in her assited living home for exam due to homebound.

## 2021-10-19 ENCOUNTER — APPOINTMENT (OUTPATIENT)
Dept: CARDIOLOGY | Facility: CLINIC | Age: 86
End: 2021-10-19
Payer: MEDICARE

## 2021-10-19 ENCOUNTER — NON-APPOINTMENT (OUTPATIENT)
Age: 86
End: 2021-10-19

## 2021-10-19 VITALS
OXYGEN SATURATION: 100 % | DIASTOLIC BLOOD PRESSURE: 60 MMHG | BODY MASS INDEX: 26.66 KG/M2 | SYSTOLIC BLOOD PRESSURE: 110 MMHG | HEART RATE: 115 BPM | WEIGHT: 160 LBS | HEIGHT: 65 IN

## 2021-10-19 PROCEDURE — 93000 ELECTROCARDIOGRAM COMPLETE: CPT | Mod: 59

## 2021-10-19 PROCEDURE — 99204 OFFICE O/P NEW MOD 45 MIN: CPT

## 2021-10-19 PROCEDURE — 93246 EXT ECG>7D<15D RECORDING: CPT

## 2021-10-19 NOTE — ASSESSMENT
[FreeTextEntry1] : Referred by Dr. JAMES FROST \par EKG: 10/19/2021 \par 89 year old  female  \par \par Plan:\par Suggest:\par 2d echocardiogram to assess LV function and assess for regional wall motion abnormalities and/ or any valvulopathy.\par Cardiac ct\par zio patch placed. \par \par \par Pt is not on aspirin.\par \par Blood pressure is well controlled. \par \par Total time spent : 60 min\par \par \par Thank you for allowing us to participate in your patient’s care. Please do not hesitate to call us at [phone] if you have questions, or if you think that your patient needs to be seen urgently.\par

## 2021-10-20 ENCOUNTER — APPOINTMENT (OUTPATIENT)
Dept: GERIATRICS | Facility: ASSISTED LIVING FACILITY | Age: 86
End: 2021-10-20
Payer: MEDICARE

## 2021-10-20 PROCEDURE — 90662 IIV NO PRSV INCREASED AG IM: CPT

## 2021-10-20 PROCEDURE — G0008: CPT

## 2021-10-20 RX ORDER — METHYLCELLULOSE 500 MG/1
500 TABLET ORAL DAILY
Qty: 60 | Refills: 6 | Status: ACTIVE | COMMUNITY
Start: 2021-10-20 | End: 1900-01-01

## 2021-10-26 NOTE — PHYSICAL EXAM
[Normal] : alert, in no acute distress [Alert] : alert [Well Nourished] : well nourished [No Acute Distress] : in no acute distress [de-identified] : temp wnl

## 2021-10-31 ENCOUNTER — RESULT REVIEW (OUTPATIENT)
Age: 86
End: 2021-10-31

## 2021-11-04 ENCOUNTER — APPOINTMENT (OUTPATIENT)
Dept: CARDIOLOGY | Facility: CLINIC | Age: 86
End: 2021-11-04
Payer: MEDICARE

## 2021-11-04 PROCEDURE — 99214 OFFICE O/P EST MOD 30 MIN: CPT

## 2021-11-04 NOTE — ASSESSMENT
[FreeTextEntry1] : Referred by Dr. JAMES FROST \par EKG: 10/19/2021 \par 89 year old  female  \par \par Plan:\par Suggest:\par 2d echocardiogram to assess LV function and assess for regional wall motion abnormalities and/ or any valvulopathy.\par Cardiac ct\par zio patch placed. \par \par \par Pt is not on aspirin.\par \par Blood pressure is well controlled. \par \par Total time spent : 60 min\par \par \par Thank you for allowing us to participate in your patient’s care. Please do not hesitate to call us at [phone] if you have questions, or if you think that your patient needs to be seen urgently.\par \par \par November 2021 -\par continued c/o SOB on minimal exertion\par awaiting results of Zio patch\par will have CTA done 11/10\par Echo reviewed - moderate mitral valve regurgitation and small pericardial effusion\par Will start pt on losartan 25mg daily\par \par \par f/u 2 weeks\par 30 min

## 2021-11-04 NOTE — REASON FOR VISIT
[Symptom and Test Evaluation] : symptom and test evaluation [FreeTextEntry1] : Ms. SHERRILL WILSON  is a 89 year year old F  who presents with complaints of extreme shortness of breath on exertion on minimal exertion. \par \par Pt denies symptoms of chest pain, lightheadedness, dizziness, syncope, claudication, orthopnea, PND, or edema. Pt denies chest pain to be reproducible by palpation and has no temporal sequence to food.\par \par Patient denies history of MI, stroke or TIA, diabetes, peripheral vascular disease, aortic aneurysm or renal impairment. \par \par Resting EKG revealed normal sinus rhythm with TWI lateral leads. \par \par She takes celebrex and tylenol. \par \par Since last visit echocardiogram reveals small  pericardial effusion, And moderate mitral regurgitation\par Patient with significant shortness of breath\par Cardiac CT scan to\par Palpation cardiac monitor results are not available for review at this time

## 2021-11-05 LAB
ALBUMIN SERPL ELPH-MCNC: 4.1 G/DL
ALP BLD-CCNC: 78 U/L
ALT SERPL-CCNC: 9 U/L
ANION GAP SERPL CALC-SCNC: 17 MMOL/L
AST SERPL-CCNC: 15 U/L
BILIRUB SERPL-MCNC: 0.2 MG/DL
BUN SERPL-MCNC: 29 MG/DL
CALCIUM SERPL-MCNC: 9.1 MG/DL
CHLORIDE SERPL-SCNC: 108 MMOL/L
CO2 SERPL-SCNC: 17 MMOL/L
CREAT SERPL-MCNC: 0.68 MG/DL
GLUCOSE SERPL-MCNC: 174 MG/DL
POTASSIUM SERPL-SCNC: 4.4 MMOL/L
PROT SERPL-MCNC: 6.2 G/DL
SODIUM SERPL-SCNC: 141 MMOL/L

## 2021-11-09 RX ORDER — CLONAZEPAM 0.25 MG/1
0.25 TABLET, ORALLY DISINTEGRATING ORAL TWICE DAILY
Qty: 30 | Refills: 0 | Status: ACTIVE | COMMUNITY
Start: 2021-02-05

## 2021-11-10 ENCOUNTER — RESULT REVIEW (OUTPATIENT)
Age: 86
End: 2021-11-10

## 2021-11-12 ENCOUNTER — APPOINTMENT (OUTPATIENT)
Dept: CARDIOLOGY | Facility: CLINIC | Age: 86
End: 2021-11-12

## 2021-11-16 ENCOUNTER — APPOINTMENT (OUTPATIENT)
Dept: CARDIOLOGY | Facility: CLINIC | Age: 86
End: 2021-11-16
Payer: MEDICARE

## 2021-11-16 VITALS
HEART RATE: 90 BPM | DIASTOLIC BLOOD PRESSURE: 50 MMHG | HEIGHT: 65 IN | BODY MASS INDEX: 26.66 KG/M2 | SYSTOLIC BLOOD PRESSURE: 120 MMHG | WEIGHT: 160 LBS

## 2021-11-16 PROCEDURE — 99214 OFFICE O/P EST MOD 30 MIN: CPT

## 2021-11-16 NOTE — ASSESSMENT
[FreeTextEntry1] : Referred by Dr. JAMES FROST \par EKG: 10/19/2021 \par 89 year old  female  with obstructive CAD on cardiac CT. \par \par November 2021 -\par continued c/o SOB on minimal exertion\par awaiting results of Zio patch\par will have CTA done 11/10\par Echo reviewed - moderate mitral valve regurgitation and small pericardial effusion\par Will start pt on losartan 25mg daily\par \par \par Nov 2021\par Discussed risks and benefits of cardiac catheterization. Descriptions including visualizations given to patient. \par Risks include but not limited to bleeding, infection, vascular compromise, stroke, heart attack, kidney failure, death.\par Benefits include revascularization and resolution of symptoms.\par Patient is in agreement with procedure -However would like to try medical therapy first.\par I have answered all of patient's questions to the best of my ability. \par Patient is on aspirin 81 mg.\par pt has not started losartan \par Suggest starting Imdur 30 mg to assess symptoms\par discussed care with Bayron _ son of pt on phone\par \par f/u video call in 1 week. \par \par BP has improved\par \par \par 30 min

## 2021-11-16 NOTE — REASON FOR VISIT
[Symptom and Test Evaluation] : symptom and test evaluation [Coronary Artery Disease] : coronary artery disease [FreeTextEntry1] : Ms. SHERRILL WILSON  is a 89 year year old F  who presents with complaints of extreme shortness of breath on exertion on minimal exertion. \par \par Pt denies symptoms of chest pain, lightheadedness, dizziness, syncope, claudication, orthopnea, PND, or edema. Pt denies chest pain to be reproducible by palpation and has no temporal sequence to food.\par \par Patient denies history of MI, stroke or TIA, diabetes, peripheral vascular disease, aortic aneurysm or renal impairment. \par \par Resting EKG revealed normal sinus rhythm with TWI lateral leads. \par \par She takes celebrex and tylenol. \par \par Since last visit echocardiogram reveals small  pericardial effusion, And moderate mitral regurgitation\par Patient with significant shortness of breath\par Cardiac CT scan to\par Palpation cardiac monitor results are not available for review at this time

## 2021-11-16 NOTE — HISTORY OF PRESENT ILLNESS
[FreeTextEntry1] : Ms. SHERRILL WILSON is a 89 year year old F who presents with complaints of extreme shortness of breath on exertion on minimal exertion. \par \par Pt denies symptoms of chest pain, lightheadedness, dizziness, syncope, claudication, orthopnea, PND, or edema. Pt denies chest pain to be reproducible by palpation and has no temporal sequence to food.\par \par Patient denies history of MI, stroke or TIA, diabetes, peripheral vascular disease, aortic aneurysm or renal impairment. \par \par Resting EKG revealed normal sinus rhythm with TWI lateral leads. \par \par She takes celebrex and tylenol. \par \par echocardiogram reveals small pericardial effusion, And moderate mitral regurgitation\par Patient with significant shortness of breath\par Cardiac CT scan to\par Palpation cardiac monitor results are not available for review at this time \par \par Since last visit still has sob on exertion\par cardiac ct with moderate to severe disease - here to discuss cardiac cath. \par \par

## 2021-11-23 ENCOUNTER — APPOINTMENT (OUTPATIENT)
Dept: CARDIOLOGY | Facility: CLINIC | Age: 86
End: 2021-11-23
Payer: MEDICARE

## 2021-11-23 DIAGNOSIS — I73.9 PERIPHERAL VASCULAR DISEASE, UNSPECIFIED: ICD-10-CM

## 2021-11-23 PROCEDURE — 93248 EXT ECG>7D<15D REV&INTERPJ: CPT

## 2021-11-23 PROCEDURE — 99214 OFFICE O/P EST MOD 30 MIN: CPT | Mod: 95

## 2021-11-23 NOTE — HISTORY OF PRESENT ILLNESS
[FreeTextEntry1] : Ms. SHERRILL WILSON is a 89 year year old F who presents with complaints of extreme shortness of breath on exertion on minimal exertion. \par \par Pt denies symptoms of chest pain, lightheadedness, dizziness, syncope, claudication, orthopnea, PND, or edema. Pt denies chest pain to be reproducible by palpation and has no temporal sequence to food.\par \par Patient denies history of MI, stroke or TIA, diabetes, peripheral vascular disease, aortic aneurysm or renal impairment. \par \par Resting EKG revealed normal sinus rhythm with TWI lateral leads. \par \par She takes celebrex and tylenol. \par \par echocardiogram reveals small pericardial effusion, And moderate mitral regurgitation\par Patient with significant shortness of breath\par Cardiac CT scan to\par Palpation cardiac monitor results are not available for review at this time \par \par Since last visit still has sob on exertion\par cardiac ct with moderate to severe disease - here to discuss cardiac cath. \par \par pt sought second opinion with Dr. Gomez at the request of her son. \par she will transfer care to him. \par discussed zio patch with pt \par explained VT and SVT\par and suggestion of starting Toprol - she says she has received medication from Dr. Gomez. \par \par \par

## 2021-11-23 NOTE — ASSESSMENT
[FreeTextEntry1] : Referred by Dr. JAMES FROST \par EKG: 10/19/2021 \par 89 year old  female  with obstructive CAD on cardiac CT. \par \par November 2021 -\par continued c/o SOB on minimal exertion\par awaiting results of Zio patch\par will have CTA done 11/10\par Echo reviewed - moderate mitral valve regurgitation and small pericardial effusion\par Will start pt on losartan 25mg daily\par \par \par Nov 2021\par Discussed risks and benefits of cardiac catheterization. Descriptions including visualizations given to patient. \par Risks include but not limited to bleeding, infection, vascular compromise, stroke, heart attack, kidney failure, death.\par Benefits include revascularization and resolution of symptoms.\par Patient is in agreement with procedure -However would like to try medical therapy first.\par I have answered all of patient's questions to the best of my ability. \par Patient is on aspirin 81 mg.\par pt has not started losartan \par Suggest starting Imdur 30 mg to assess symptoms\par discussed care with Bayron _ son of pt on phone\par \par f/u video call in 1 week. \par \par Nov 2021\par Followup patient\par Reason patient request contact: follow up on above pmhx and pt's questions\par Doctor location: Office setting\par Patient: at home \par Conference took place on video conference on [employer]\par Consent was obtained from patient\par Time spent: 25 minutes > 50% of the time in the encounter in both counseling and coordination of care for any or all of the diagnosis submitted\par \par pt sought second opinion with Dr. Gomez at the request of her son. \par she will transfer care to him. \par discussed zio patch with pt \par explained VT and SVT\par and suggestion of starting Toprol - she says she has received medication from Dr. Gomez. \par \par 30 min

## 2021-12-06 ENCOUNTER — RESULT REVIEW (OUTPATIENT)
Age: 86
End: 2021-12-06

## 2021-12-10 ENCOUNTER — RX RENEWAL (OUTPATIENT)
Age: 86
End: 2021-12-10

## 2021-12-10 RX ORDER — CELECOXIB 100 MG/1
100 CAPSULE ORAL
Qty: 56 | Refills: 0 | Status: ACTIVE | COMMUNITY
Start: 2021-06-09 | End: 1900-01-01

## 2021-12-21 ENCOUNTER — APPOINTMENT (OUTPATIENT)
Dept: NEUROLOGY | Facility: CLINIC | Age: 86
End: 2021-12-21
Payer: MEDICARE

## 2021-12-21 VITALS
SYSTOLIC BLOOD PRESSURE: 110 MMHG | TEMPERATURE: 98.6 F | HEART RATE: 79 BPM | BODY MASS INDEX: 25.83 KG/M2 | DIASTOLIC BLOOD PRESSURE: 73 MMHG | WEIGHT: 155 LBS | HEIGHT: 65 IN

## 2021-12-21 DIAGNOSIS — R55 SYNCOPE AND COLLAPSE: ICD-10-CM

## 2021-12-21 PROCEDURE — 99215 OFFICE O/P EST HI 40 MIN: CPT

## 2021-12-21 RX ORDER — ISOSORBIDE MONONITRATE 30 MG/1
30 TABLET, EXTENDED RELEASE ORAL
Qty: 30 | Refills: 3 | Status: DISCONTINUED | COMMUNITY
Start: 2021-11-16 | End: 2021-12-21

## 2021-12-21 NOTE — REVIEW OF SYSTEMS
[SOB on Exertion] : shortness of breath during exertion [Fever] : no fever [Chills] : no chills [Feeling Poorly] : not feeling poorly [Feeling Tired] : not feeling tired [Recent Weight Gain (___ Lbs)] : no recent weight gain [Recent Weight Loss (___ Lbs)] : no recent weight loss [Confused or Disoriented] : no confusion [Memory Lapses or Loss] : no memory loss [Decr. Concentrating Ability] : no decrease in concentrating ability [Difficulty with Language] : no ~M difficulty with language [Changed Thought Patterns] : no change in thought patterns [Repeating Questions] : no repeated questioning about recent events [Facial Weakness] : no facial weakness [Arm Weakness] : no arm weakness [Hand Weakness] : no hand weakness [Leg Weakness] : no leg weakness [Poor Coordination] : good coordination [Difficulty Writing] : no difficulty writing [Difficulties in Speech] : no speech difficulties [Numbness] : no numbness [Tingling] : no tingling [Abnormal Sensation] : no abnormal sensation [Hypersensitivity] : no hypersensitivity [Seizures] : no convulsions [Dizziness] : no dizziness [Fainting] : no fainting [Lightheadedness] : no lightheadedness [Vertigo] : no vertigo [Cluster Headache] : no cluster headache [Migraine Headache] : no migraine headache [Tension Headache] : no tension-type headache [Difficulty Walking] : no difficulty walking [Inability to Walk] : able to walk [Ataxia] : no ataxia [Frequent Falls] : not falling [Limping] : not limping [Suicidal] : not suicidal [Sleep Disturbances] : no sleep disturbances [Anxiety] : no anxiety [Depression] : no depression [Change In Personality] : no personality change [Emotional Problems] : no emotional problems [Eye Pain] : no eye pain [Red Eyes] : eyes not red [Eyesight Problems] : no eyesight problems [Discharge From Eyes] : no purulent discharge from the eyes [Dry Eyes] : no dryness of the eyes [Eyes Itch] : no itching of the eyes [Earache] : no earache [Loss Of Hearing] : no hearing loss [Nosebleeds] : no nosebleeds [Nasal Discharge] : no nasal discharge [Sore Throat] : no sore throat [Hoarseness] : no hoarseness [Heart Rate Is Slow] : the heart rate was not slow [Heart Rate Is Fast] : the heart rate was not fast [Chest Pain] : no chest pain [Palpitations] : no palpitations [Leg Claudication] : no intermittent leg claudication [Lower Ext Edema] : no lower extremity edema [Shortness Of Breath] : no shortness of breath [Wheezing] : no wheezing [Cough] : no cough [Orthopnea] : no orthopnea [PND] : no PND [Abdominal Pain] : no abdominal pain [Vomiting] : no vomiting [Constipation] : no constipation [Diarrhea] : no diarrhea [Heartburn] : no heartburn [Melena] : no melena [Dysuria] : no dysuria [Incontinence] : no incontinence [Pelvic Pain] : no pelvic pain [Dysmenorrhea] : no dysmenorrhea [Vaginal Discharge] : no vaginal discharge [Abn Vaginal Bleeding] : no unexplained vaginal bleeding [Arthralgias] : no arthralgias [Joint Pain] : no joint pain [Joint Swelling] : no joint swelling [Joint Stiffness] : no joint stiffness [Limb Pain] : no limb pain [Limb Swelling] : no limb swelling [Skin Lesions] : no skin lesions [Skin Wound] : no skin wound [Itching] : no itching [Change In A Mole] : no change in a mole [Breast Pain] : no breast pain [Breast Lump] : no breast lump [Proptosis] : no proptosis [Hot Flashes] : no hot flashes [Muscle Weakness] : no muscle weakness [Deepening Of The Voice] : no deepening of the voice [Feelings Of Weakness] : no feelings of weakness [Easy Bleeding] : no tendency for easy bleeding [Easy Bruising] : no tendency for easy bruising [Swollen Glands] : no swollen glands [Swollen Glands In The Neck] : no swollen glands in the neck

## 2021-12-21 NOTE — DATA REVIEWED
[de-identified] : 4/13-4/14/21: normal aEEG \par 4/13/21: normal rEEG  [de-identified] : 10/4/21 :CT head: \par  There is a dense calcification seen just medial to the superior aspect of the right lateral \par ventricle, presumably from old infection, unchanged. Configuration of the ventricles, in proportion \par to the size of the sulci. 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 Chronic lacunar \par infarcts in the left basal ganglia and right caudate head unchanged. \par \par \par \par  IMPRESSION: \par \par  No acute intracranial abnormality. The size of the ventricles appears in proportion to the sulci. \par \par \par CT head: 2/14/21:\par Generalized prominence of the ventricles, sulci, and fissures is presumably age-related, and  periventricular white matter lucency is most compatible with chronic small vessel ischemic changes. An unchanged area of right occipital encephalomalacia is \par compatible with an old infarct and there are small bilateral lacunar infarcts.. There is no evidence of acute intracranial hemorrhage or territorial infarct, mass-effect or midline shift. \par \par 2/16/21: Hemoglobin A1c 9.4 high --> 7.6 \par \par \par \par

## 2021-12-21 NOTE — HISTORY OF PRESENT ILLNESS
[FreeTextEntry1] : Noelle is presenting for follow-up. Here to review results of CT head. Reports that she has had no further episodes of loss of consciousness, near-syncope or events concerning for seizure. She does report significant shortness of breath on exertion. She is doing physical therapy 2x a week and saw a pulmonologist 6/14/21. PFTS were normal. Echo with mil valvulopathy, normal EF and PA pressure. \par \par  She saw a cardiologist in the [facility], Dr. Gomez for a second opinion. As per her report,  he did not recommend cardiac angiogram/intervention.  She saw Dr. Montgomery in November. Zio patch showed periods of vtach and svt. He recommended toprol. \par \par \par Medications: \par vitamin B12 2500 mcg \par paxil 20 mg tablet qd \par ambien 5 mg daily \par ambien 5 mg prn at 2 am \par clonazepam 0.25 mg sublingual bid \par vitamin d2 \par celebrex \par \par \par \par ____________________________________________\par \par \par Noelle is a 89-year-old lady with a past medical history of exertional dyspnea, anxiety/depression. She is presenting for follow-up. She was seen by Dr. Toscano who reports that she needs help with ADLs. She was seen by Dr. Philippe, pulmonologist. He did PFTs; these were normal. Echo was normal. His impression was that anxiety plays a role with dyspnea. She is taking paxil 10 mg daily and klonopin. Takes Tylenol and Celebrex for arthritis. She also has neuropathy – was taking gabapentin, not prescribed it now. \par \par She is doing well. No falls. She reports that she feels a sensation of shortness of breath in particular when she bends forward. She also generally feels unsteady as if she is going to fall. \par \par Now does physical therapy once a week. Has some anxiety regarding 2 nurses in the nursing facility. Feels they can be rude. \par ________________________________\par 5/18/2021 \par Last seen in clinic on March 5, 2021. Presenting to clinic with daughter-in-law. No falls. Ambulates with rolling walker. No clinical events concerning for seizure. Reports shortness of breath with minimal activity. Reports "extreme pain in legs" sometimes with sharp shooting pain in right leg. \par \par She is taking celebrex and used to take prednisone for pain. Prednisone was stopped due to blood sugar as per her report. She felt it controlled her pain better. \par \par Other than that she is doing well. She suddenly has episodes of anxiety or panic like attacks for which she takes klonopin. Sleeping well.  Adjusting better overall to assisted living facility, [facility]. \par \par Medications: \par vitamin B12 2500 mcg \par paxil 20 mg tablet qd \par ambien 5 mg daily \par ambien 5 mg prn at 2 am \par clonazepam 0.25 mg sublingual bid \par vitamin d2 \par celebrex \par \par ________________________________________\par Noelle is a pleasant 89-year-old lady, with history of anxiety on klonopin and insomnia, on ambien, resident of [facility] on [address], history of possible PE (unclear, now refusing anticoagulation), presenting with episodes of panic and shaking, rule out seizure.  She had recent ED visit due to falls with head injury. \par Referred to neurology for work up of fall and memory changes.   \par \par Overall, Noelle reports having difficulty adjusting to the new environment of [facility]. She is used to having her own apartment and had an assistant who helped her with most IADLs. 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 Now she finds it difficult to concentrate and she gets frustrated when asked to shift from one task to another. Recently started on Paxil. \par Stressors include losing son to pancreatic cancer \par \par She tries to get enough sleep, sleeps from 9:30-6:30 pm. She relies on zolpidem of sleep  and clonazepam for anxiety . She feels tense and gets angry sometimes. She feels her head "going funny" and did have a fall. She thinks she "went unconscious". She hit her head. No observed tonic clonic activity or history of seizures. \par \par As per ED notes, she had a fall 2/12/21 and was evaluated by ED. She reported feeling more confused with head pain and returned to ED at [facility] 2/14/21.  She had a CT head which showed small vessel ischemic changes and an area of right occipital encephalomalacia compatible with old infarct with bilateral lacunar infarcts. No evidence of hemorrhage or territorial infarct. \par \par \par \par \par \par

## 2021-12-21 NOTE — PHYSICAL EXAM
[FreeTextEntry1] : \par Mental Status: alert and oriented to person, place, time. Knows the name of the hospital. Able to name pen, thumb. Able to do serial sevens. \par CN: visual acuity, VFF, blink to confrontation \par III, IV, VI, PERRL, EOMI \par V sensation normal to light touch, pinprick\par VII normal squint vs resistance, normal smile, face symmetric \par VIII: diminished hearing to voice \par IX, X normal gag, symmetric palate, uvula raises midline \par XI normal shrug versus resistance and lateralization of head versus resistance \par XII tongue symmetric, normal strength \par Motor: at least 4/5 throughout \par Sensory: normal to LT, diminished sensation to light touch in toes and fingertips \par Reflexes \par Brachioradialis, biceps, triceps, patella and ankles 1+ \par Gait : unsteady wide-based gait , ambulates with rolling walker

## 2021-12-21 NOTE — DISCUSSION/SUMMARY
[FreeTextEntry1] : Noelle is a pleasant 89-year- old lady with history of PE?, anxiety, chronic insomnia, presenting for evaluation of memory changes and a fall with head injury resulting in ED visit. 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 \par \par Unclear etiology of falls. Overall low suspicion of seizures, although right occipital encephalomalacia can serve as nidus for seizure. \par \kimberly Has some difficulties with set shifting, executive dysfunction but overall impression is that she is very anxious. Repeat CT head 10/4/21 was stable. \par Seen by pulmonary for dyspnea on exertion - normal PFTs. Has vtach and svt on zio patch. On toprol. \par Saw cardiology - no role for cardiac revascularization, echo 11/2021 with normal left ventricular size and function. \par \par Does appear to breath heavily with minimal exertion. Unclear what part of this is related to anxiety. Will refer to pulmonary again to see if any further work-up is required. \par \par  \par

## 2022-01-04 ENCOUNTER — APPOINTMENT (OUTPATIENT)
Dept: GERIATRICS | Facility: CLINIC | Age: 87
End: 2022-01-04
Payer: MEDICARE

## 2022-01-04 VITALS
HEART RATE: 72 BPM | HEIGHT: 65 IN | SYSTOLIC BLOOD PRESSURE: 101 MMHG | OXYGEN SATURATION: 97 % | RESPIRATION RATE: 20 BRPM | TEMPERATURE: 97.2 F | DIASTOLIC BLOOD PRESSURE: 82 MMHG

## 2022-01-04 DIAGNOSIS — N39.0 URINARY TRACT INFECTION, SITE NOT SPECIFIED: ICD-10-CM

## 2022-01-04 DIAGNOSIS — Z23 ENCOUNTER FOR IMMUNIZATION: ICD-10-CM

## 2022-01-04 DIAGNOSIS — Z92.29 PERSONAL HISTORY OF OTHER DRUG THERAPY: ICD-10-CM

## 2022-01-04 PROCEDURE — 99213 OFFICE O/P EST LOW 20 MIN: CPT

## 2022-01-04 NOTE — PHYSICAL EXAM
[Alert] : alert [No Acute Distress] : in no acute distress [Normal Outer Ear/Nose] : the ears and nose were normal in appearance [Normal Appearance] : the appearance of the neck was normal [Supple] : the neck was supple [No Respiratory Distress] : no respiratory distress [No Acc Muscle Use] : no accessory muscle use [Respiration, Rhythm And Depth] : normal respiratory rhythm and effort [Auscultation Breath Sounds / Voice Sounds] : lungs were clear to auscultation bilaterally [Heart Rate And Rhythm] : heart rate was normal and rhythm regular [Bowel Sounds] : normal bowel sounds [Abdomen Tenderness] : non-tender [Abdomen Soft] : soft [No Spinal Tenderness] : no spinal tenderness [Normal Gait] : normal gait [Sensation] : the sensory exam was normal to light touch and pinprick [No Focal Deficits] : no focal deficits [Normal Affect] : the affect was normal [Normal Mood] : the mood was normal [de-identified] : no pain or tenderness over left rib area, no crepitus [de-identified] : small kavita and area of redness about dime sized under left breast; no bruising or other sign of trauma

## 2022-01-04 NOTE — ASSESSMENT
[FreeTextEntry1] : discussed with pt; she does not think it\par is gas - this could just be related to a \par minor trauma from the bra; will increase her lidocaine cream\par treatment to tid or 3 days and add one more dose standing tylenol in the afternoon\par for 3 days

## 2022-01-04 NOTE — HISTORY OF PRESENT ILLNESS
[FreeTextEntry1] : Pt states; "it all started when I wore a new bra that bothered me\par on 12/31 all day the wire was tight now I have a pain there\par No chest pain, no radiating arm pain, no new SOB (does have chronic SOB on exertion)\par no back pain\par no constipation - Does feel like she will be having a BM soon\par

## 2022-01-07 ENCOUNTER — APPOINTMENT (OUTPATIENT)
Dept: FAMILY MEDICINE | Facility: ASSISTED LIVING FACILITY | Age: 87
End: 2022-01-07
Payer: MEDICARE

## 2022-01-07 DIAGNOSIS — R26.81 UNSTEADINESS ON FEET: ICD-10-CM

## 2022-01-07 PROCEDURE — 99349 HOME/RES VST EST MOD MDM 40: CPT

## 2022-01-11 PROBLEM — R26.81 GAIT INSTABILITY: Status: ACTIVE | Noted: 2021-02-05

## 2022-01-12 ENCOUNTER — NON-APPOINTMENT (OUTPATIENT)
Age: 87
End: 2022-01-12

## 2022-01-13 ENCOUNTER — APPOINTMENT (OUTPATIENT)
Dept: PULMONOLOGY | Facility: CLINIC | Age: 87
End: 2022-01-13

## 2022-01-21 ENCOUNTER — APPOINTMENT (OUTPATIENT)
Dept: FAMILY MEDICINE | Facility: ASSISTED LIVING FACILITY | Age: 87
End: 2022-01-21
Payer: MEDICARE

## 2022-01-21 DIAGNOSIS — R19.7 DIARRHEA, UNSPECIFIED: ICD-10-CM

## 2022-01-21 PROCEDURE — 99348 HOME/RES VST EST LOW MDM 30: CPT

## 2022-01-21 RX ORDER — METHYLPREDNISOLONE 4 MG/1
4 TABLET ORAL DAILY
Qty: 30 | Refills: 11 | Status: ACTIVE | COMMUNITY
Start: 2022-01-21 | End: 1900-01-01

## 2022-01-24 ENCOUNTER — NON-APPOINTMENT (OUTPATIENT)
Age: 87
End: 2022-01-24

## 2022-01-25 ENCOUNTER — RX RENEWAL (OUTPATIENT)
Age: 87
End: 2022-01-25

## 2022-01-26 ENCOUNTER — RX RENEWAL (OUTPATIENT)
Age: 87
End: 2022-01-26

## 2022-01-26 RX ORDER — CYANOCOBALAMIN (VITAMIN B-12) 2500 MCG
2500 TABLET, SUBLINGUAL SUBLINGUAL DAILY
Qty: 30 | Refills: 11 | Status: ACTIVE | COMMUNITY
Start: 2021-07-21 | End: 1900-01-01

## 2022-01-27 ENCOUNTER — APPOINTMENT (OUTPATIENT)
Dept: PULMONOLOGY | Facility: CLINIC | Age: 87
End: 2022-01-27
Payer: MEDICARE

## 2022-01-27 VITALS
WEIGHT: 155 LBS | OXYGEN SATURATION: 95 % | HEART RATE: 66 BPM | DIASTOLIC BLOOD PRESSURE: 70 MMHG | SYSTOLIC BLOOD PRESSURE: 100 MMHG | HEIGHT: 65 IN | BODY MASS INDEX: 25.83 KG/M2 | TEMPERATURE: 98.2 F

## 2022-01-27 PROCEDURE — 99214 OFFICE O/P EST MOD 30 MIN: CPT

## 2022-01-27 NOTE — HISTORY OF PRESENT ILLNESS
[TextBox_4] : 89 year old female with dyspnea, anxiety, insomnia, CAD came for f/u.\par Last seen in July 2021\par She had a recent cardiac cath, seen, my read: worsening atelectasis RtLL, no new suspicious lesions.\par She c/o left sided CP irradiated to the right side with dyspnea at the same time.\par She says she has dyspnea on exertion every time when she walks.\par She was breathing OK in the office and until she touched her Lt side and then she became dyspneic.\par Has mild cough in the morning.\par She has dyspnea for many years and until 2 years ago she was doing incentive spirometry with improvement\par She walked in the office 120 feet without dyspnea and POX was 95% all the time, HR 80, no tachycardia.\par No hemoptysis.\par \par

## 2022-01-27 NOTE — PHYSICAL EXAM
[No Acute Distress] : no acute distress [Normal Appearance] : normal appearance [Normal Rate/Rhythm] : normal rate/rhythm [Normal S1, S2] : normal s1, s2 [No Resp Distress] : no resp distress [Clear to Auscultation Bilaterally] : clear to auscultation bilaterally [Kyphosis] : kyphosis [No Clubbing] : no clubbing [No Cyanosis] : no cyanosis [No Focal Deficits] : no focal deficits [Oriented x3] : oriented x3 [Normal Affect] : normal affect

## 2022-01-31 ENCOUNTER — RX RENEWAL (OUTPATIENT)
Age: 87
End: 2022-01-31

## 2022-02-02 ENCOUNTER — NON-APPOINTMENT (OUTPATIENT)
Age: 87
End: 2022-02-02

## 2022-02-02 ENCOUNTER — APPOINTMENT (OUTPATIENT)
Dept: GERIATRICS | Facility: CLINIC | Age: 87
End: 2022-02-02
Payer: MEDICARE

## 2022-02-02 VITALS
DIASTOLIC BLOOD PRESSURE: 70 MMHG | HEART RATE: 62 BPM | SYSTOLIC BLOOD PRESSURE: 150 MMHG | TEMPERATURE: 98.2 F | OXYGEN SATURATION: 97 % | RESPIRATION RATE: 20 BRPM

## 2022-02-02 DIAGNOSIS — T14.8XXA OTHER INJURY OF UNSPECIFIED BODY REGION, INITIAL ENCOUNTER: ICD-10-CM

## 2022-02-02 DIAGNOSIS — M25.50 PAIN IN UNSPECIFIED JOINT: ICD-10-CM

## 2022-02-02 DIAGNOSIS — M15.9 POLYOSTEOARTHRITIS, UNSPECIFIED: ICD-10-CM

## 2022-02-02 PROCEDURE — 99213 OFFICE O/P EST LOW 20 MIN: CPT

## 2022-02-04 PROBLEM — T14.8XXA MUSCLE STRAIN: Status: ACTIVE | Noted: 2022-01-04

## 2022-02-04 PROBLEM — M25.50 JOINT PAIN: Status: ACTIVE | Noted: 2021-02-05

## 2022-02-04 PROBLEM — M15.9 GENERALIZED OSTEOARTHRITIS: Status: ACTIVE | Noted: 2021-05-25

## 2022-02-04 NOTE — ASSESSMENT
[FreeTextEntry1] : Likely related to osteoarthritis\par Continue tylenol as ordered. \par apply 4% lidocaine to left rib area tid x 2 days\par May benefit from yoga or PT if she participates\par \par Pt seeing her MD tomorrow 2/3 in [place]\par \par She should be sent to ED if any episode of severe non resolving\par pain for full eval.

## 2022-02-04 NOTE — HISTORY OF PRESENT ILLNESS
[FreeTextEntry1] : prior visit on 1/4 when pt had similar complaint. Today she again feels that\par it all started when she wore a new bra that bothered me\par on 12/31 all day the wire was tight and caused her pain.\par \par Now, the pain has mostly resolved. She has\par No chest pain, no radiating arm pain, no new SOB (does have chronic SOB on exertion)\par no back pain no c/o constipation.\par

## 2022-02-04 NOTE — PHYSICAL EXAM
[de-identified] : + point tenderness over on left rib area, no crepitus, no pain when lifting arm or breathng deeply [de-identified] : no bruising or other sign of trauma

## 2022-02-08 ENCOUNTER — RESULT REVIEW (OUTPATIENT)
Age: 87
End: 2022-02-08

## 2022-02-11 ENCOUNTER — NON-APPOINTMENT (OUTPATIENT)
Age: 87
End: 2022-02-11

## 2022-02-11 ENCOUNTER — APPOINTMENT (OUTPATIENT)
Dept: FAMILY MEDICINE | Facility: ASSISTED LIVING FACILITY | Age: 87
End: 2022-02-11
Payer: MEDICARE

## 2022-02-11 DIAGNOSIS — R15.9 FULL INCONTINENCE OF FECES: ICD-10-CM

## 2022-02-11 DIAGNOSIS — F41.8 OTHER SPECIFIED ANXIETY DISORDERS: ICD-10-CM

## 2022-02-11 DIAGNOSIS — R07.89 OTHER CHEST PAIN: ICD-10-CM

## 2022-02-11 DIAGNOSIS — R41.89 OTHER SYMPTOMS AND SIGNS INVOLVING COGNITIVE FUNCTIONS AND AWARENESS: ICD-10-CM

## 2022-02-11 DIAGNOSIS — M19.90 UNSPECIFIED OSTEOARTHRITIS, UNSPECIFIED SITE: ICD-10-CM

## 2022-02-11 DIAGNOSIS — R41.3 OTHER AMNESIA: ICD-10-CM

## 2022-02-11 DIAGNOSIS — R46.89 OTHER SYMPTOMS AND SIGNS INVOLVING COGNITIVE FUNCTIONS AND AWARENESS: ICD-10-CM

## 2022-02-11 PROCEDURE — 99496 TRANSJ CARE MGMT HIGH F2F 7D: CPT

## 2022-02-14 ENCOUNTER — NON-APPOINTMENT (OUTPATIENT)
Age: 87
End: 2022-02-14

## 2022-02-15 PROBLEM — F41.8 ANXIETY ABOUT HEALTH: Status: ACTIVE | Noted: 2021-02-05

## 2022-02-15 PROBLEM — R41.89 COGNITIVE AND BEHAVIORAL CHANGES: Status: ACTIVE | Noted: 2022-02-14

## 2022-02-15 PROBLEM — R15.9 FECAL INCONTINENCE: Status: ACTIVE | Noted: 2021-09-17

## 2022-02-15 PROBLEM — R07.89 ATYPICAL CHEST PAIN: Status: ACTIVE | Noted: 2022-02-15

## 2022-02-15 PROBLEM — R41.3 MEMORY LOSS: Status: ACTIVE | Noted: 2021-02-05

## 2022-02-15 PROBLEM — M19.90 ARTHRITIS: Status: ACTIVE | Noted: 2019-02-08

## 2022-02-15 NOTE — REVIEW OF SYSTEMS
[Memory Loss] : memory loss [Anxiety] : anxiety [Depression] : depression [de-identified] : cognitive decline [de-identified] : c

## 2022-02-15 NOTE — PHYSICAL EXAM
[Normal] : no acute distress, well nourished, well developed and well-appearing [de-identified] : walking difficulties [de-identified] : combative, abusive and manipulative; told me to get out

## 2022-02-15 NOTE — HISTORY OF PRESENT ILLNESS
[Admitted on: ___] : The patient was admitted on [unfilled] [Discharged on ___] : discharged on [unfilled] [Discharge Summary] : discharge summary [Pertinent Labs] : pertinent labs [Discharge Med List] : discharge medication list [FreeTextEntry2] : The patient was seen in her room in [place]\par Was in [place] with chest pain\par Acute coronary event was r/o\par The patient was discharged on Gabapentin and Tramadol for "pain"\par I do not agree with these 2 medication, the side effects in a patient with walking instability may increase the risk of falls\par Also this 2 medication can cause worsening of her cognitive decline

## 2022-02-15 NOTE — PLAN
[FreeTextEntry1] : The patient did not cooperate\par My recommendation was and is to have pain management evaluation\par The patient dismiss me as her primary care physician

## 2022-02-16 ENCOUNTER — APPOINTMENT (OUTPATIENT)
Dept: CARDIOLOGY | Facility: CLINIC | Age: 87
End: 2022-02-16
Payer: MEDICARE

## 2022-02-16 ENCOUNTER — NON-APPOINTMENT (OUTPATIENT)
Age: 87
End: 2022-02-16

## 2022-02-16 VITALS
HEIGHT: 65 IN | OXYGEN SATURATION: 97 % | BODY MASS INDEX: 26.99 KG/M2 | DIASTOLIC BLOOD PRESSURE: 53 MMHG | SYSTOLIC BLOOD PRESSURE: 127 MMHG | WEIGHT: 162 LBS | RESPIRATION RATE: 12 BRPM | HEART RATE: 69 BPM

## 2022-02-16 DIAGNOSIS — B02.29 OTHER POSTHERPETIC NERVOUS SYSTEM INVOLVEMENT: ICD-10-CM

## 2022-02-16 PROCEDURE — 99214 OFFICE O/P EST MOD 30 MIN: CPT

## 2022-02-17 PROBLEM — B02.29 POST HERPETIC NEURALGIA: Status: ACTIVE | Noted: 2022-02-17

## 2022-02-17 RX ORDER — METOPROLOL SUCCINATE 25 MG/1
25 TABLET, EXTENDED RELEASE ORAL DAILY
Refills: 0 | Status: ACTIVE | COMMUNITY
Start: 1900-01-01 | End: 1900-01-01

## 2022-02-17 RX ORDER — ROSUVASTATIN CALCIUM 10 MG/1
10 TABLET, FILM COATED ORAL DAILY
Refills: 0 | Status: ACTIVE | COMMUNITY

## 2022-02-17 RX ORDER — CHLORHEXIDINE GLUCONATE 4 %
5 LIQUID (ML) TOPICAL AT BEDTIME
Refills: 0 | Status: ACTIVE | COMMUNITY

## 2022-02-17 RX ORDER — FUROSEMIDE 20 MG/1
20 TABLET ORAL DAILY
Refills: 0 | Status: ACTIVE | COMMUNITY

## 2022-02-17 RX ORDER — GABAPENTIN 100 MG/1
100 CAPSULE ORAL 3 TIMES DAILY
Refills: 0 | Status: ACTIVE | COMMUNITY
Start: 1900-01-01 | End: 1900-01-01

## 2022-02-17 RX ORDER — ASPIRIN 81 MG/1
81 TABLET ORAL DAILY
Refills: 0 | Status: ACTIVE | COMMUNITY

## 2022-02-17 NOTE — ASSESSMENT
[FreeTextEntry1] : 89 yo female resident at [facility] on [facility] with CAD (calcium score 914 per 11/10/21 cardiac CTA with probably severe stenosis of the OM3 (ostium of the first branch), moderate to severe stenosis of proximal LCx, moderate stenosis of mid LAD, D1, and mid RCA, and mild to moderate stenosis of D2 -> significant CT FFR of LAD of 0.8, LCx.& RCA with negative FFR), and reported PAD.\par \par Given patient's known CAD with significant CT FFR of LAD and continued exertional complaints of exertional dyspnea, I have strong suspicion that her symptoms are due to significant CAD.\par Topic of cardiac catheterization for further evaluation and possible intervention was discussed with patient again, who will likely proceed with cath but she wanted me to discuss with her son (Bayron) when he returns from trip abroad at the end of this week. If patient's son and patient agree with cardiac cath, will have patient return for pre-cath labs and arrange for cardiac cath at [facility].\par Will continue metoprolol succinate 25 mg po daily, aspirin 81 mg po dialy. and rosuvastatin 10 mg po daily.\par Patient was advised to discontinue her Celebrex given her CAD, unless she absolutely needs this for pain control, but she currently denies significant pain.

## 2022-02-17 NOTE — PHYSICAL EXAM
[Well Developed] : well developed [Well Nourished] : well nourished [No Acute Distress] : no acute distress [Normal Conjunctiva] : normal conjunctiva [Normal Venous Pressure] : normal venous pressure [No Carotid Bruit] : no carotid bruit [Normal S1, S2] : normal S1, S2 [No Murmur] : no murmur [No Rub] : no rub [No Gallop] : no gallop [Clear Lung Fields] : clear lung fields [Good Air Entry] : good air entry [No Respiratory Distress] : no respiratory distress  [No Edema] : no edema [No Cyanosis] : no cyanosis [No Clubbing] : no clubbing [Moves all extremities] : moves all extremities [No Focal Deficits] : no focal deficits [Normal Speech] : normal speech [Alert and Oriented] : alert and oriented [Normal memory] : normal memory

## 2022-02-17 NOTE — REVIEW OF SYSTEMS
[Dyspnea on exertion] : dyspnea during exertion [Negative] : Heme/Lymph [Numbness (Hypoesthesia)] : no numbness [Speech Disturbance] : no speech disturbance

## 2022-02-17 NOTE — HISTORY OF PRESENT ILLNESS
[FreeTextEntry1] : 89 yo female resident at [facility] with CAD (calcium score 914 per 11/10/21 cardiac CTA with probably severe stenosis of the OM3 (ostium of the first branch), moderate to severe stenosis of proximal LCx, moderate stenosis of mid LAD, D1, and mid RCA, and mild to moderate stenosis of D2 -> significant CT FFR of LAD of 0.8, LCx.& RCA with negative FFR), and reported PAD. Patient was initially seen in consultation at [facility] on 2/9/22 when she was admitted with exertional dyspnea over the past several months and left rib discomfort over the past 2 weeks. Patient was last seen by Dr. Montgomery in Nov 2021 and the topic of cardiac cath was approached at that time, but the patient and son deferred. Patient reports having shingles several months ago in the area of her left rib pain.

## 2022-02-17 NOTE — CARDIOLOGY SUMMARY
[de-identified] : \par 2/9/22 ECG (at [facility]): Sinus rhythm, LVH, T wave inversions in V3-4\par 2/8/22 ECG (at [facility]): Sinus rhythm, LVH, T wave inversions in V1-6\par   [de-identified] : \par Zio XT monitor (10/19/21 - 10/29/21): \par - 4 non-sustained runs of ventricular tachycardia (longest run 10 beats)\par - 36 non-sustained runs of supraventricular tachycardia (longet run ~10 seconds)\par  [de-identified] : \par 11/1/21 Echo:\par Normal left ventricular size and systolic function, LVEF 69%.\par Grade I diastolic dysfunction (abnormal relaxation filling pattern), normal to mildly elevated filling pressures.  \par Moderate mitral annular calcification. Posteriorly directed moderate mitral valve regurgitation.  \par Aortic valve sclerosis without stenosis (peak/mean gradient is 6.3 mmHg / 3.2 mmHg)\par Small pericardial effusion.  \par \par 7/14/21 Echo:\par Normal left ventricular size and systolic function, EF 63%. Indeterminate assessment of diastolic function. \par Normal right ventricular size and systolic function.  \par Mild aortic valve sclerosis without stenosis.  \par Mild mitral annular calcification. Trace mitral valve regurgitation.  \par Trace tricuspid valve regurgitation.\par  [de-identified] : \par 2/9/22 CTA chest: No central or segmental pulmonary emboli.\par \par 12/6/21 CT FFR:\par LAD: 0.80 -> clinically significant\par LCx: 0.92 -> negative, but it is noted that OM3 was not assessed\par RCA: 0.91 -> negative \par \par 11/10/21 Cardiac CTA:\par 1.  The calcium score is severe at 914 Agatston units, which is at the 86 percentile, adjusted for an age of 84 with the same gender and race.\par 2.  Probably severe stenosis of the OM3 (ostium of the first branch)\par 3.  Moderate to severe stenosis of the proximal left circumflex.\par 4.  Moderate stenosis of the mid LAD, D1, and mid RCA.\par 5.  Mild to moderate stenosis of D2.\par 6.  Nonobstructive coronary artery disease in the remaining segments.\par 7.  Small pericardial effusion. \par 8.  Based on these findings CT-FFR may be considered.

## 2022-02-22 ENCOUNTER — NON-APPOINTMENT (OUTPATIENT)
Age: 87
End: 2022-02-22

## 2022-02-23 ENCOUNTER — RX RENEWAL (OUTPATIENT)
Age: 87
End: 2022-02-23

## 2022-02-25 ENCOUNTER — APPOINTMENT (OUTPATIENT)
Dept: GASTROENTEROLOGY | Facility: CLINIC | Age: 87
End: 2022-02-25
Payer: MEDICARE

## 2022-02-25 VITALS
BODY MASS INDEX: 26.99 KG/M2 | HEART RATE: 60 BPM | SYSTOLIC BLOOD PRESSURE: 100 MMHG | HEIGHT: 65 IN | OXYGEN SATURATION: 97 % | WEIGHT: 162 LBS | TEMPERATURE: 98.7 F | DIASTOLIC BLOOD PRESSURE: 50 MMHG

## 2022-02-25 DIAGNOSIS — R19.8 OTHER SPECIFIED SYMPTOMS AND SIGNS INVOLVING THE DIGESTIVE SYSTEM AND ABDOMEN: ICD-10-CM

## 2022-02-25 PROCEDURE — 99213 OFFICE O/P EST LOW 20 MIN: CPT

## 2022-02-25 NOTE — HISTORY OF PRESENT ILLNESS
[de-identified] : Presents  for follow up. Doing well on Metamucil.  Occasional diarrhea / Occasional constipation\par \par Last evaluated 9/2021 in follow up for diarrhea  Stool studies reviewed from  were unremarkable. Evaluated in Sept 2021  for fecal incontinence  and  diarrhea. Patient  reported  2-3  weeks  of loose / small quantity stools. Has  had  similar  sxs over the years, but  only occurred every 1-2  months. Apparently had  rectal manometry test ~  5 years ago for fecal incontinence which apparently was managed with Imodium and  Kegel exercises.  \par \par Denies nausea, vomiting, fever, chills, melena, hematemesis, BRBPR.  Describes  a normal colonoscopy within the  past 10 years.\par \par Denies  recent  travel / antibiotics / change in medication\par \par Most recent PMD  note  reviewed\par

## 2022-02-25 NOTE — ASSESSMENT
[FreeTextEntry1] : 1. Change in bowel habits ( diarrhea - intermittent alternating with constipation) . Doing  well on Metamucil...will titrate  to bowel habits and  follow up as needed\par \par \par 2. 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  Continue daily Kegel exercises (  daily). Metamucil  or Citrucel  daily

## 2022-03-03 ENCOUNTER — APPOINTMENT (OUTPATIENT)
Dept: PULMONOLOGY | Facility: CLINIC | Age: 87
End: 2022-03-03
Payer: MEDICARE

## 2022-03-03 VITALS
HEIGHT: 65 IN | DIASTOLIC BLOOD PRESSURE: 60 MMHG | TEMPERATURE: 96.4 F | WEIGHT: 162 LBS | SYSTOLIC BLOOD PRESSURE: 100 MMHG | OXYGEN SATURATION: 97 % | HEART RATE: 63 BPM | BODY MASS INDEX: 26.99 KG/M2

## 2022-03-03 DIAGNOSIS — J98.11 ATELECTASIS: ICD-10-CM

## 2022-03-03 PROCEDURE — 99214 OFFICE O/P EST MOD 30 MIN: CPT

## 2022-03-03 NOTE — REVIEW OF SYSTEMS
[SOB on Exertion] : sob on exertion [Chest Discomfort] : chest discomfort [Negative] : Genitourinary

## 2022-03-03 NOTE — REASON FOR VISIT
[Follow-Up] : a follow-up visit [Shortness of Breath] : shortness of breath [TextBox_44] : atelectasis

## 2022-03-03 NOTE — HISTORY OF PRESENT ILLNESS
[TextBox_4] : 89 year old female with anxiety, dyspnea, atelectasis RtLL, came for f/u.\par She was recently hospitalized at [facility] for CP\par Had a CTA chest, seen, my read: improvement in RtLL atelectasis, minimal fibrosis b/l bases, no PE\par Did not have PFT's yet.\par \par In the hospital she was seen by Cardiology and was suspected that dyspnea is due to CAD.\par She has the same dyspnea on exertion and when she has pain in the left lateral chest. She had herpes Zoster few years ago \par She feels fatigued as well.\par Denies cough, hemoptysis.\par \par \par \par

## 2022-03-03 NOTE — PHYSICAL EXAM
[No Acute Distress] : no acute distress [Normal Appearance] : normal appearance [No Neck Mass] : no neck mass [Normal Rate/Rhythm] : normal rate/rhythm [Normal S1, S2] : normal s1, s2 [No Resp Distress] : no resp distress [Clear to Auscultation Bilaterally] : clear to auscultation bilaterally [Kyphosis] : kyphosis [No Cyanosis] : no cyanosis [No Focal Deficits] : no focal deficits [Oriented x3] : oriented x3 [Normal Affect] : normal affect

## 2022-03-10 ENCOUNTER — RX RENEWAL (OUTPATIENT)
Age: 87
End: 2022-03-10

## 2022-03-29 ENCOUNTER — NON-APPOINTMENT (OUTPATIENT)
Age: 87
End: 2022-03-29

## 2022-03-29 ENCOUNTER — RX RENEWAL (OUTPATIENT)
Age: 87
End: 2022-03-29

## 2022-03-29 ENCOUNTER — APPOINTMENT (OUTPATIENT)
Dept: CARDIOLOGY | Facility: CLINIC | Age: 87
End: 2022-03-29
Payer: MEDICARE

## 2022-03-29 VITALS
HEART RATE: 72 BPM | RESPIRATION RATE: 14 BRPM | BODY MASS INDEX: 25.83 KG/M2 | DIASTOLIC BLOOD PRESSURE: 62 MMHG | HEIGHT: 65 IN | OXYGEN SATURATION: 96 % | SYSTOLIC BLOOD PRESSURE: 129 MMHG | WEIGHT: 155 LBS

## 2022-03-29 DIAGNOSIS — R06.00 DYSPNEA, UNSPECIFIED: ICD-10-CM

## 2022-03-29 DIAGNOSIS — I25.10 ATHEROSCLEROTIC HEART DISEASE OF NATIVE CORONARY ARTERY W/OUT ANGINA PECTORIS: ICD-10-CM

## 2022-03-29 DIAGNOSIS — R73.09 OTHER ABNORMAL GLUCOSE: ICD-10-CM

## 2022-03-29 PROCEDURE — 93000 ELECTROCARDIOGRAM COMPLETE: CPT

## 2022-03-29 PROCEDURE — 36415 COLL VENOUS BLD VENIPUNCTURE: CPT

## 2022-03-29 PROCEDURE — 99214 OFFICE O/P EST MOD 30 MIN: CPT

## 2022-03-29 RX ORDER — ACETAMINOPHEN 500 MG/1
500 TABLET ORAL AT BEDTIME
Refills: 0 | Status: ACTIVE | COMMUNITY

## 2022-03-29 NOTE — CARDIOLOGY SUMMARY
[de-identified] : \par 3/29/22 ECG: Sinus rhythm, rate 67 bpm, nonspecific T-wave abnormalities\par 2/9/22 ECG (at [facility]): Sinus rhythm, LVH, T wave inversions in V3-4\par 2/8/22 ECG (at [facility]): Sinus rhythm, LVH, T wave inversions in V1-6\par  [de-identified] : \par Zio XT monitor (10/19/21 - 10/29/21): \par - 4 non-sustained runs of ventricular tachycardia (longest run 10 beats)\par - 36 non-sustained runs of supraventricular tachycardia (longet run ~10 seconds)\par  [de-identified] : \par 11/1/21 Echo:\par Normal left ventricular size and systolic function, LVEF 69%.\par Grade I diastolic dysfunction (abnormal relaxation filling pattern), normal to mildly elevated filling pressures.  \par Moderate mitral annular calcification. Posteriorly directed moderate mitral valve regurgitation.  \par Aortic valve sclerosis without stenosis (peak/mean gradient is 6.3 mmHg / 3.2 mmHg)\par Small pericardial effusion.  \par \par 7/14/21 Echo:\par Normal left ventricular size and systolic function, EF 63%. Indeterminate assessment of diastolic function. \par Normal right ventricular size and systolic function.  \par Mild aortic valve sclerosis without stenosis.  \par Mild mitral annular calcification. Trace mitral valve regurgitation.  \par Trace tricuspid valve regurgitation.\par  [de-identified] : \par 2/9/22 CTA chest: No central or segmental pulmonary emboli.\par \par 12/6/21 CT FFR:\par LAD: 0.80 -> clinically significant\par LCx: 0.92 -> negative, but it is noted that OM3 was not assessed\par RCA: 0.91 -> negative \par \par 11/10/21 Cardiac CTA:\par 1.  The calcium score is severe at 914 Agatston units, which is at the 86 percentile, adjusted for an age of 84 with the same gender and race.\par 2.  Probably severe stenosis of the OM3 (ostium of the first branch)\par 3.  Moderate to severe stenosis of the proximal left circumflex.\par 4.  Moderate stenosis of the mid LAD, D1, and mid RCA.\par 5.  Mild to moderate stenosis of D2.\par 6.  Nonobstructive coronary artery disease in the remaining segments.\par 7.  Small pericardial effusion. \par 8.  Based on these findings CT-FFR may be considered.

## 2022-03-29 NOTE — ASSESSMENT
[FreeTextEntry1] : 89 yo female resident at [facility] with CAD (calcium score 914 per 11/10/21 cardiac CTA with probably severe stenosis of the OM3 (ostium of the first branch), moderate to severe stenosis of proximal LCx, moderate stenosis of mid LAD, D1, and mid RCA, and mild to moderate stenosis of D2 -> significant CT FFR of LAD of 0.8, LCx.& RCA with negative FFR), and reported PAD.\par \par Given patient's known CAD with significant CT FFR of LAD and continued worsening complaints of exertional dyspnea with minimal activity, her symptoms are likely due to significant obstructive CAD.\par At patient's request, patient will be referred for cardiac cath at [facility].\par Pre-cath labs were drawn today.\par Will continue metoprolol succinate 25 mg po daily, aspirin 81 mg po dialy. and rosuvastatin 10 mg po daily.

## 2022-03-29 NOTE — HISTORY OF PRESENT ILLNESS
[FreeTextEntry1] : 89 yo female resident at [facility] with CAD (calcium score 914 per 11/10/21 cardiac CTA with probably severe stenosis of the OM3 (ostium of the first branch), moderate to severe stenosis of proximal LCx, moderate stenosis of mid LAD, D1, and mid RCA, and mild to moderate stenosis of D2 -> significant CT FFR of LAD of 0.8, LCx.& RCA with negative FFR), and reported PAD. Patient presents today for follow-up. She reports worsening exertional dyspnea with minimal activity. After discussion with her family and the degree of her symptoms, patient is now ready to proceed with cardiac catheterization, which she is requesting to have done at [facility].

## 2022-03-30 ENCOUNTER — RX RENEWAL (OUTPATIENT)
Age: 87
End: 2022-03-30

## 2022-03-30 LAB
ALBUMIN SERPL ELPH-MCNC: 3.8 G/DL
ALP BLD-CCNC: 72 U/L
ALT SERPL-CCNC: 10 U/L
ANION GAP SERPL CALC-SCNC: 13 MMOL/L
AST SERPL-CCNC: 14 U/L
BASOPHILS # BLD AUTO: 0.05 K/UL
BASOPHILS NFR BLD AUTO: 0.9 %
BILIRUB SERPL-MCNC: 0.2 MG/DL
BUN SERPL-MCNC: 23 MG/DL
CALCIUM SERPL-MCNC: 9.2 MG/DL
CHLORIDE SERPL-SCNC: 105 MMOL/L
CHOLEST SERPL-MCNC: 193 MG/DL
CO2 SERPL-SCNC: 24 MMOL/L
CREAT SERPL-MCNC: 0.71 MG/DL
EGFR: 78 ML/MIN/1.73M2
EOSINOPHIL # BLD AUTO: 0.2 K/UL
EOSINOPHIL NFR BLD AUTO: 3.6 %
ESTIMATED AVERAGE GLUCOSE: 166 MG/DL
GLUCOSE SERPL-MCNC: 208 MG/DL
HBA1C MFR BLD HPLC: 7.4 %
HCT VFR BLD CALC: 38.2 %
HDLC SERPL-MCNC: 52 MG/DL
HGB BLD-MCNC: 12.1 G/DL
IMM GRANULOCYTES NFR BLD AUTO: 0.2 %
LDLC SERPL CALC-MCNC: 98 MG/DL
LYMPHOCYTES # BLD AUTO: 1.09 K/UL
LYMPHOCYTES NFR BLD AUTO: 19.7 %
MAN DIFF?: NORMAL
MCHC RBC-ENTMCNC: 29.7 PG
MCHC RBC-ENTMCNC: 31.7 GM/DL
MCV RBC AUTO: 93.6 FL
MONOCYTES # BLD AUTO: 0.58 K/UL
MONOCYTES NFR BLD AUTO: 10.5 %
NEUTROPHILS # BLD AUTO: 3.6 K/UL
NEUTROPHILS NFR BLD AUTO: 65.1 %
NONHDLC SERPL-MCNC: 141 MG/DL
PLATELET # BLD AUTO: 240 K/UL
POTASSIUM SERPL-SCNC: 4.6 MMOL/L
PROT SERPL-MCNC: 6.3 G/DL
RBC # BLD: 4.08 M/UL
RBC # FLD: 14.6 %
SODIUM SERPL-SCNC: 142 MMOL/L
TRIGL SERPL-MCNC: 215 MG/DL
WBC # FLD AUTO: 5.53 K/UL

## 2022-04-01 ENCOUNTER — NON-APPOINTMENT (OUTPATIENT)
Age: 87
End: 2022-04-01

## 2022-04-05 ENCOUNTER — RX RENEWAL (OUTPATIENT)
Age: 87
End: 2022-04-05

## 2022-04-05 RX ORDER — ACETAMINOPHEN EXTRA STRENGTH 500 MG/1
500 TABLET ORAL
Qty: 84 | Refills: 0 | Status: ACTIVE | COMMUNITY
Start: 2022-04-05 | End: 1900-01-01

## 2022-04-13 ENCOUNTER — APPOINTMENT (OUTPATIENT)
Dept: GERIATRICS | Facility: CLINIC | Age: 87
End: 2022-04-13
Payer: MEDICARE

## 2022-04-13 VITALS
TEMPERATURE: 98.2 F | DIASTOLIC BLOOD PRESSURE: 80 MMHG | HEART RATE: 70 BPM | RESPIRATION RATE: 20 BRPM | SYSTOLIC BLOOD PRESSURE: 136 MMHG

## 2022-04-13 PROCEDURE — 99213 OFFICE O/P EST LOW 20 MIN: CPT

## 2022-04-14 ENCOUNTER — RX RENEWAL (OUTPATIENT)
Age: 87
End: 2022-04-14

## 2022-04-14 NOTE — HISTORY OF PRESENT ILLNESS
[FreeTextEntry1] : Pt states she slid to floor from her chair\par no head strike no other injury no loc\par

## 2022-04-14 NOTE — ASSESSMENT
[FreeTextEntry1] : cleanse w NS, apply bacitracin cover with bandaid small cut. Apply bacitracin to abrasion bid x 5 days\par Nursing staff informed

## 2022-04-14 NOTE — PHYSICAL EXAM
[Alert] : alert [No Acute Distress] : in no acute distress [Normal Outer Ear/Nose] : the ears and nose were normal in appearance [Normal Appearance] : the appearance of the neck was normal [Supple] : the neck was supple [No Respiratory Distress] : no respiratory distress [No Acc Muscle Use] : no accessory muscle use [Respiration, Rhythm And Depth] : normal respiratory rhythm and effort [Auscultation Breath Sounds / Voice Sounds] : lungs were clear to auscultation bilaterally [Heart Rate And Rhythm] : heart rate was normal and rhythm regular [No Spinal Tenderness] : no spinal tenderness [No Focal Deficits] : no focal deficits [Normal Affect] : the affect was normal [Normal Mood] : the mood was normal [de-identified] : long superficial abrasion along back about 15cm one central small cut 1x0.5.

## 2022-04-20 ENCOUNTER — APPOINTMENT (OUTPATIENT)
Dept: NEUROLOGY | Facility: CLINIC | Age: 87
End: 2022-04-20

## 2022-04-25 ENCOUNTER — APPOINTMENT (OUTPATIENT)
Dept: GERIATRICS | Facility: CLINIC | Age: 87
End: 2022-04-25
Payer: MEDICARE

## 2022-04-25 VITALS
OXYGEN SATURATION: 97 % | DIASTOLIC BLOOD PRESSURE: 56 MMHG | HEART RATE: 67 BPM | SYSTOLIC BLOOD PRESSURE: 102 MMHG | TEMPERATURE: 97.7 F | RESPIRATION RATE: 22 BRPM

## 2022-04-25 DIAGNOSIS — S01.01XA LACERATION W/OUT FOREIGN BODY OF SCALP, INITIAL ENCOUNTER: ICD-10-CM

## 2022-04-25 DIAGNOSIS — R42 DIZZINESS AND GIDDINESS: ICD-10-CM

## 2022-04-25 DIAGNOSIS — S20.419A ABRASION OF UNSPECIFIED BACK WALL OF THORAX, INITIAL ENCOUNTER: ICD-10-CM
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
## 2022-04-25 PROCEDURE — 99213 OFFICE O/P EST LOW 20 MIN: CPT

## 2022-04-26 NOTE — PHYSICAL EXAM
[Alert] : alert [No Acute Distress] : in no acute distress [Normal Outer Ear/Nose] : the ears and nose were normal in appearance [Normal Appearance] : the appearance of the neck was normal [Supple] : the neck was supple [No Respiratory Distress] : no respiratory distress [No Acc Muscle Use] : no accessory muscle use [Respiration, Rhythm And Depth] : normal respiratory rhythm and effort [Auscultation Breath Sounds / Voice Sounds] : lungs were clear to auscultation bilaterally [Heart Rate And Rhythm] : heart rate was normal and rhythm regular [Bowel Sounds] : normal bowel sounds [Abdomen Tenderness] : non-tender [Abdomen Soft] : soft [No Spinal Tenderness] : no spinal tenderness [No Focal Deficits] : no focal deficits [Normal Affect] : the affect was normal [Normal Mood] : the mood was normal [Sclera] : the sclera and conjunctiva were normal [PERRL] : pupils were equal in size, round, and reactive to light [de-identified] : healing laceration occipital lobe right side with a little scabbing, no edema, no erythema fully approximated, no drainage, no sutures or staples present due to dissolvable sutures

## 2022-04-26 NOTE — HISTORY OF PRESENT ILLNESS
[FreeTextEntry1] : fall on 4/14 with laceration of back of head and\par seen in ED.\par Pt here due to thinks she needs stitches removed\par Has been washing hair and has some tenderness at wound site\par \par also has c/o of dizziness at times, even when lying down\par followed by neuro and pcp for this

## 2022-04-26 NOTE — ASSESSMENT
[FreeTextEntry1] : dizziness chronic - has had synccope and collapse\par may be related to hypotension which is followed by MD\par does have daily BP check which has been averaging in the 130s/70s\par \par pt had many questions about multiple health issues including dizziness and laceration\par reassured her there are no staples or sutures remaining.\par and she has f/u with her pcp and specialists. \par recently started PT for balance and strengthening.\par

## 2022-05-03 ENCOUNTER — RX RENEWAL (OUTPATIENT)
Age: 87
End: 2022-05-03

## 2022-05-18 ENCOUNTER — RX RENEWAL (OUTPATIENT)
Age: 87
End: 2022-05-18

## 2022-05-20 ENCOUNTER — RX RENEWAL (OUTPATIENT)
Age: 87
End: 2022-05-20

## 2022-05-31 ENCOUNTER — RX RENEWAL (OUTPATIENT)
Age: 87
End: 2022-05-31

## 2022-06-16 ENCOUNTER — RX RENEWAL (OUTPATIENT)
Age: 87
End: 2022-06-16

## 2022-07-04 ENCOUNTER — RX RENEWAL (OUTPATIENT)
Age: 87
End: 2022-07-04

## 2022-07-06 ENCOUNTER — RX RENEWAL (OUTPATIENT)
Age: 87
End: 2022-07-06

## 2022-07-21 ENCOUNTER — RX RENEWAL (OUTPATIENT)
Age: 87
End: 2022-07-21

## 2022-07-22 ENCOUNTER — RX RENEWAL (OUTPATIENT)
Age: 87
End: 2022-07-22

## 2022-09-08 NOTE — DIETITIAN INITIAL EVALUATION ADULT. - SIGNS/SYMPTOMS

## 2023-05-01 NOTE — ASSESSMENT
[FreeTextEntry1] : \par -should continue Aspirin 81 mg qd for secondary stroke prevention\par -pulmonary follow-up for dyspnea on exertion\par - physical therapy for gait instability - 2 times a week  \par \par Return to clinic in 3-4 months \par \par \par  Consent (Scalp)/Introductory Paragraph: The rationale for Mohs was explained to the patient and consent was obtained. The risks, benefits and alternatives to therapy were discussed in detail. Specifically, the risks of changes in hair growth pattern secondary to repair, infection, scarring, bleeding, prolonged wound healing, incomplete removal, allergy to anesthesia, nerve injury and recurrence were addressed. Prior to the procedure, the treatment site was clearly identified and confirmed by the patient. All components of Universal Protocol/PAUSE Rule completed.

## 2023-11-30 NOTE — STROKE CODE NOTE - IV ALTEPLASE ADMINISTRATION
prescription given to work on proprioceptive training, ankle stabilization exercises as well as desensitization therapy.  -Plan for follow-up in 4 to 6 weeks  -At that point if she is still having lack of improvement we may order an MRI. E/M NEW Level 3- Greater than 30 minutes were spent with patient including history, exam, review of imaging (not including taking), discussion of diagnosis and treatment options, answering questions, and documentation. Danielle Bentley    Orthopaedic Surgery   11/30/23  8:27 AM
Yes

## 2024-12-01 NOTE — PHYSICAL EXAM
Addended by: IGNACIO CHAVEZ on: 11/30/2024 08:57 PM     Modules accepted: Orders     [General Appearance - Alert] : alert [General Appearance - Well Nourished] : well nourished [General Appearance - In No Acute Distress] : in no acute distress [Normal Oral Mucosa] : normal oral mucosa [No Oral Pallor] : no oral pallor [Outer Ear] : the ears and nose were normal in appearance [Neck Appearance] : the appearance of the neck was normal [Neck Cervical Mass (___cm)] : no neck mass was observed [Jugular Venous Distention Increased] : there was no jugular-venous distention [Respiration, Rhythm And Depth] : normal respiratory rhythm and effort [Exaggerated Use Of Accessory Muscles For Inspiration] : no accessory muscle use [Auscultation Breath Sounds / Voice Sounds] : lungs were clear to auscultation bilaterally [Heart Rate And Rhythm] : heart rate was normal and rhythm regular [Heart Sounds Gallop] : no gallops [Heart Sounds Pericardial Friction Rub] : no pericardial rub [Bowel Sounds] : normal bowel sounds [Abdomen Soft] : soft [Abdomen Tenderness] : non-tender [Cervical Lymph Nodes Enlarged Posterior Bilaterally] : posterior cervical [Cervical Lymph Nodes Enlarged Anterior Bilaterally] : anterior cervical [No CVA Tenderness] : no ~M costovertebral angle tenderness [FreeTextEntry1] : walker, notably gait dysfunction [Skin Color & Pigmentation] : normal skin color and pigmentation [Skin Turgor] : normal skin turgor [] : no rash